# Patient Record
Sex: FEMALE | Race: BLACK OR AFRICAN AMERICAN | NOT HISPANIC OR LATINO | Employment: FULL TIME | URBAN - METROPOLITAN AREA
[De-identification: names, ages, dates, MRNs, and addresses within clinical notes are randomized per-mention and may not be internally consistent; named-entity substitution may affect disease eponyms.]

---

## 2019-05-01 ENCOUNTER — TELEPHONE (OUTPATIENT)
Dept: NEUROLOGY | Facility: CLINIC | Age: 59
End: 2019-05-01

## 2019-05-01 NOTE — TELEPHONE ENCOUNTER
----- Message from Leonora Stanley sent at 4/30/2019  2:37 PM CDT -----  Contact: pt  Name of Who is Calling: Octavia      What is the request in detail: requesting a call back in reference to getting scheduled to be seen by Dr Cavazos for a Concussion. Pt states that she had a missed call from [facility], but I dont see where anyone contacted her. Pt states that she was originally trying to be scheduled going through , but she will be going through her insurance and really needs to be seen. Please call and advise    Can the clinic reply by [facility]: no       What Number to [facility] Back [facility] not [facility]: [phone]

## 2019-05-08 ENCOUNTER — TELEPHONE (OUTPATIENT)
Dept: NEUROLOGY | Facility: CLINIC | Age: 59
End: 2019-05-08

## 2019-05-08 NOTE — TELEPHONE ENCOUNTER
Call received from pt. Very upset about not getting a soon appt for concussion. Also states she has been calling for appt for over a month (no other messages seen other than 5/1).     Appt rescheduled for Dr Cavazos on 5/20 at 1pm at [facility]. Pt aware of the above and in agreement and satisfied. Appt reminder also postal mailed.

## 2019-05-20 ENCOUNTER — OFFICE VISIT (OUTPATIENT)
Dept: NEUROLOGY | Facility: CLINIC | Age: 59
End: 2019-05-20
Payer: OTHER MISCELLANEOUS

## 2019-05-20 VITALS
WEIGHT: 246.25 LBS | HEIGHT: 66 IN | DIASTOLIC BLOOD PRESSURE: 83 MMHG | BODY MASS INDEX: 39.58 KG/M2 | HEART RATE: 80 BPM | SYSTOLIC BLOOD PRESSURE: 126 MMHG

## 2019-05-20 DIAGNOSIS — S13.4XXA WHIPLASH, INITIAL ENCOUNTER: ICD-10-CM

## 2019-05-20 DIAGNOSIS — H81.90 VESTIBULOPATHY, UNSPECIFIED LATERALITY: ICD-10-CM

## 2019-05-20 DIAGNOSIS — G43.901 STATUS MIGRAINOSUS: ICD-10-CM

## 2019-05-20 DIAGNOSIS — M54.81 BILATERAL OCCIPITAL NEURALGIA: ICD-10-CM

## 2019-05-20 DIAGNOSIS — S06.0X0D CONCUSSION WITHOUT LOSS OF CONSCIOUSNESS, SUBSEQUENT ENCOUNTER: Primary | ICD-10-CM

## 2019-05-20 DIAGNOSIS — G44.329 CHRONIC POST-TRAUMATIC HEADACHE, NOT INTRACTABLE: ICD-10-CM

## 2019-05-20 DIAGNOSIS — G44.86 CERVICOGENIC HEADACHE: ICD-10-CM

## 2019-05-20 DIAGNOSIS — H51.9 CONVERGENCE INSUFFICIENCY OR PALSY IN BINOCULAR EYE MOVEMENT: ICD-10-CM

## 2019-05-20 DIAGNOSIS — F07.81 POST-CONCUSSION SYNDROME: ICD-10-CM

## 2019-05-20 PROCEDURE — 99999 PR PBB SHADOW E&M-EST. PATIENT-LVL III: CPT | Mod: PBBFAC,,, | Performed by: PSYCHIATRY & NEUROLOGY

## 2019-05-20 PROCEDURE — 99999 PR PBB SHADOW E&M-EST. PATIENT-LVL III: ICD-10-PCS | Mod: PBBFAC,,, | Performed by: PSYCHIATRY & NEUROLOGY

## 2019-05-20 PROCEDURE — 99204 OFFICE O/P NEW MOD 45 MIN: CPT | Mod: S$GLB,,, | Performed by: PSYCHIATRY & NEUROLOGY

## 2019-05-20 PROCEDURE — 99204 PR OFFICE/OUTPT VISIT, NEW, LEVL IV, 45-59 MIN: ICD-10-PCS | Mod: S$GLB,,, | Performed by: PSYCHIATRY & NEUROLOGY

## 2019-05-20 RX ORDER — TRAMADOL HYDROCHLORIDE 50 MG/1
TABLET ORAL
COMMUNITY
End: 2019-08-09

## 2019-05-20 RX ORDER — INDOMETHACIN 75 MG/1
CAPSULE, EXTENDED RELEASE ORAL
COMMUNITY
End: 2019-08-09

## 2019-05-20 RX ORDER — OXYCODONE AND ACETAMINOPHEN 5; 325 MG/1; MG/1
TABLET ORAL
COMMUNITY
End: 2019-08-09

## 2019-05-20 RX ORDER — METOPROLOL SUCCINATE 50 MG/1
TABLET, EXTENDED RELEASE ORAL
COMMUNITY
End: 2021-08-04

## 2019-05-20 RX ORDER — AZITHROMYCIN 250 MG/1
TABLET, FILM COATED ORAL
COMMUNITY
End: 2019-08-09

## 2019-05-20 RX ORDER — CODEINE PHOSPHATE AND GUAIFENESIN 10; 100 MG/5ML; MG/5ML
SOLUTION ORAL
COMMUNITY
End: 2019-08-09

## 2019-05-20 RX ORDER — KETOROLAC TROMETHAMINE 10 MG/1
TABLET, FILM COATED ORAL
COMMUNITY
End: 2019-08-09

## 2019-05-20 RX ORDER — TRIAMTERENE AND HYDROCHLOROTHIAZIDE 37.5; 25 MG/1; MG/1
CAPSULE ORAL
COMMUNITY
End: 2019-08-09

## 2019-05-20 RX ORDER — ERGOCALCIFEROL 1.25 MG/1
CAPSULE ORAL
COMMUNITY
End: 2019-08-09

## 2019-05-20 RX ORDER — FLUTICASONE PROPIONATE 50 MCG
SPRAY, SUSPENSION (ML) NASAL
COMMUNITY
End: 2019-08-09

## 2019-05-20 RX ORDER — PREDNISONE 20 MG/1
TABLET ORAL
COMMUNITY
End: 2019-08-09

## 2019-05-20 RX ORDER — CIPROFLOXACIN 500 MG/1
TABLET ORAL
COMMUNITY
End: 2019-08-09

## 2019-05-20 RX ORDER — HYDROXYZINE PAMOATE 50 MG/1
CAPSULE ORAL
COMMUNITY
End: 2019-08-09

## 2019-05-20 RX ORDER — AMLODIPINE BESYLATE 5 MG/1
TABLET ORAL
Refills: 2 | COMMUNITY
Start: 2019-04-22 | End: 2022-01-12

## 2019-05-20 RX ORDER — GABAPENTIN 300 MG/1
CAPSULE ORAL
COMMUNITY
End: 2019-08-09

## 2019-05-20 RX ORDER — HYDROCHLOROTHIAZIDE 25 MG/1
25 TABLET ORAL
Refills: 2 | COMMUNITY
Start: 2019-04-22 | End: 2024-03-27 | Stop reason: SDUPTHER

## 2019-05-20 RX ORDER — OMEGA-3 FATTY ACIDS 1000 MG
2 CAPSULE ORAL
COMMUNITY
End: 2019-08-09

## 2019-05-20 RX ORDER — SIMETHICONE 80 MG
TABLET,CHEWABLE ORAL
COMMUNITY
End: 2019-08-09

## 2019-05-20 RX ORDER — METHYLPREDNISOLONE 4 MG/1
TABLET ORAL
Qty: 1 PACKAGE | Refills: 0 | Status: SHIPPED | OUTPATIENT
Start: 2019-05-20 | End: 2019-08-09

## 2019-05-20 RX ORDER — DICLOFENAC SODIUM 10 MG/G
4 GEL TOPICAL
COMMUNITY
End: 2019-08-09

## 2019-05-20 RX ORDER — CELECOXIB 200 MG/1
CAPSULE ORAL
COMMUNITY
End: 2019-08-09

## 2019-05-20 RX ORDER — LOSARTAN POTASSIUM 50 MG/1
TABLET ORAL
COMMUNITY
End: 2019-08-09

## 2019-05-20 RX ORDER — COLCHICINE 0.6 MG/1
TABLET ORAL
COMMUNITY
End: 2019-08-09

## 2019-05-20 NOTE — PROGRESS NOTES
Subjective:       Patient ID: Octavia Arrington is a 59 y.o. female.    Chief Complaint:  Concussion and Headache      Consultation Requested by:   Provider Lisa  No address on file    History of Present Illness  59-year-old female presents for evaluation of concussion sustained on 03/14/2019.  She was at work as an  when she hit the top of her head on the right side on a shelf when she bent over to grab a broom.  She did not lose consciousness at that time but had immediate dizziness and headache.  She notes that now her main complaint is headache.  She notes her headaches last for 2-3 hours and happen 4 to 5 times a week.  She shooting tingling pain on the top right of her scalp and on the occiput.  She also notes that she has blurring of vision especially longer duration of work days.  She notes she is having no memory issues she notes she is having no emotional issues.  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    History reviewed. No pertinent past medical history.    History reviewed. No pertinent surgical history.    History reviewed. No pertinent family history.    Social History     Socioeconomic History    Marital status:      Spouse name: Not on file    Number of children: Not on file    Years of education: Not on file    Highest education level: Not on file   Occupational History    Not on file   Social Needs    Financial resource strain: Not on file    Food insecurity:     Worry: Not on file     Inability: Not on file    Transportation needs:     Medical: Not on file     Non-medical: Not on file   Tobacco Use    Smoking status: Never Smoker    Smokeless tobacco: Never Used   Substance and Sexual Activity    Alcohol use: Never     Frequency: Never    Drug use: Not on file    Sexual activity: Not on file   Lifestyle    Physical activity:      Days per week: Not on file     Minutes per session: Not on file    Stress: Not on file   Relationships    Social connections:     Talks on phone: Not on file     Gets together: Not on file     Attends Hinduism service: Not on file     Active member of club or organization: Not on file     Attends meetings of clubs or organizations: Not on file     Relationship status: Not on file   Other Topics Concern    Not on file   Social History Narrative    Not on file       Review of Systems  Review of Systems   Constitutional: Positive for fatigue.   Eyes: Negative for photophobia and visual disturbance.   Gastrointestinal: Negative for nausea and vomiting.   Musculoskeletal: Positive for neck stiffness. Negative for neck pain.   Neurological: Positive for dizziness and headaches.   Psychiatric/Behavioral: Negative for confusion, decreased concentration and sleep disturbance.   All other systems reviewed and are negative.      Objective:     Vitals:    05/20/19 1331   BP: 126/83   Pulse: 80      Physical Exam   Constitutional: She is oriented to person, place, and time. She appears well-developed and well-nourished. No distress.   HENT:   Head: Normocephalic and atraumatic.   Right Ear: Hearing normal.   Left Ear: Hearing normal.   Eyes: Pupils are equal, round, and reactive to light. EOM are normal. Right eye exhibits normal extraocular motion and no nystagmus. Left eye exhibits normal extraocular motion and no nystagmus.   Neck: Neck supple. Muscular tenderness present. No spinous process tenderness present. Carotid bruit is not present. No neck rigidity. Decreased range of motion present.       Neurological: She is alert and oriented to person, place, and time. She has normal strength and normal reflexes. She displays no atrophy and no tremor. No cranial nerve deficit or sensory deficit. She exhibits normal muscle tone. She has a normal Finger-Nose-Finger Test and a normal Heel to Allison Test. She displays a negative  Romberg sign. Gait normal. Coordination and gait normal. GCS eye subscore is 4. GCS verbal subscore is 5. GCS motor subscore is 6.   Reflex Scores:       Tricep reflexes are 2+ on the right side and 2+ on the left side.       Bicep reflexes are 2+ on the right side and 2+ on the left side.       Brachioradialis reflexes are 2+ on the right side and 2+ on the left side.       Patellar reflexes are 2+ on the right side and 2+ on the left side.       Achilles reflexes are 2+ on the right side and 2+ on the left side.  Fundoscopic exam shows no papilledema, no hemorrhage, no exudates bilaterally.    Cranial nerves 2-12 are without deficit.   Psychiatric: She has a normal mood and affect. Her speech is normal and behavior is normal. Thought content normal.   Vitals reviewed.        NEUROLOGICAL EXAMINATION:     MENTAL STATUS   Oriented to person, place, and time.   Attention: normal. Concentration: normal.   Speech: speech is normal   Level of consciousness: alert  Knowledge: good.   Able to name object. Able to read. Able to repeat. Able to write.     CRANIAL NERVES   Cranial nerves II through XII intact.     CN II   Visual fields full to confrontation.   Visual acuity: normal    CN III, IV, VI   Pupils are equal, round, and reactive to light.  Extraocular motions are normal.        Laina is abnormal 5/9/9, indicating vestibulopathy right worse than left.  There is convergence insufficiency, abnormal at 25 cm.     MOTOR EXAM   Muscle bulk: normal  Overall muscle tone: normal  Right arm tone: normal  Left arm tone: normal  Right leg tone: normal  Left leg tone: normal    Strength   Strength 5/5 throughout.     REFLEXES     Reflexes   Right brachioradialis: 2+  Left brachioradialis: 2+  Right biceps: 2+  Left biceps: 2+  Right triceps: 2+  Left triceps: 2+  Right patellar: 2+  Left patellar: 2+  Right achilles: 2+  Left achilles: 2+  Right : 2+  Left : 2+  Right plantar: normal  Left plantar: normal    SENSORY EXAM    Light touch normal.   Vibration normal.   Proprioception normal.   Pinprick normal.     GAIT AND COORDINATION     Gait  Gait: normal     Coordination   Finger to nose coordination: normal  Heel to shin coordination: normal     I have spent over 50% of a 45 min visit in guidance, counseling and discussion of treatment options.   Assessment/Plan:     Problem List Items Addressed This Visit     None      Visit Diagnoses     Concussion without loss of consciousness, subsequent encounter    -  Primary    Relevant Orders    Ambulatory Referral to Physical/Occupational Therapy    Ambulatory Referral to Physical/Occupational Therapy    Whiplash, initial encounter        Relevant Orders    Ambulatory Referral to Physical/Occupational Therapy    Vestibulopathy, unspecified laterality        Relevant Orders    Ambulatory Referral to Physical/Occupational Therapy    Post-concussion syndrome        Relevant Orders    Ambulatory Referral to Physical/Occupational Therapy    Ambulatory Referral to Physical/Occupational Therapy    Convergence insufficiency or palsy in binocular eye movement        Relevant Orders    Ambulatory Referral to Physical/Occupational Therapy    Cervicogenic headache        Relevant Orders    Ambulatory Referral to Physical/Occupational Therapy    Bilateral occipital neuralgia        Relevant Orders    Ambulatory Referral to Physical/Occupational Therapy    Status migrainosus        Relevant Medications    methylPREDNISolone (MEDROL DOSEPACK) 4 mg tablet    Chronic post-traumatic headache, not intractable            59-year-old female presents for evaluation of concussion sustained on 03/14/2019.  She did not have loss of consciousness at the time.  She was at work and struck her head on a shelf in a closet while reaching for a broom.  At this time she is about to go on summer break and does not plan on working at that time.  We have discussed that it would be good to get her rehabilitated over the  summer.  I believe she needs physical therapy for her vestibulopathy and her whiplash issues.  We have discussed sending in a steroid pack to break current headache cycle.  Depending on the results of that I would consider amitriptyline versus some other neuropathic pain issue or ultrasound-guided injections for what appeared to be occipital neuralgia headaches related to her concussion.  We have also discussed occupational therapy for her convergence insufficiency.  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     The patient verbalizes understanding and agreement with the treatment plan. Questions were sought and answered to her stated verbal satisfaction.        Joce Cavazos MD    This note is dictated on Dragon Natural Speaking word recognition program. There are word recognition mistakes that are occasionally missed on review.

## 2019-05-20 NOTE — LETTER
May 20, 2019      No Recipients           [facility] - Neurology  [address]  [facility] [address]  Phone: [phone]  Fax: [phone]          Patient: Octavia Arrington   MR Number: [id number]   YOB: 1960   Date of Visit: 5/20/2019       Dear Severiano Snowystem:    Thank you for referring Octavia Arrington to me for evaluation. Attached you will find relevant portions of my assessment and plan of care.    If you have questions, please do not hesitate to call me. I look forward to following Octavia Arrington along with you.    Sincerely,    Gerard Cavazos III, MD    Enclosure  CC:  No Recipients    If you would like to receive this communication electronically, please contact [email or web address] or [phone] to request more information on [employer] access.    For providers and/or their staff who would like to refer a patient to [facility], please contact us through our one-stop-shop provider referral line, Jefferson Lucas, at [phone].    If you feel you have received this communication in error or would no longer like to receive these types of communications, please e-mail [email or web address]

## 2019-06-03 ENCOUNTER — CLINICAL SUPPORT (OUTPATIENT)
Dept: REHABILITATION | Facility: HOSPITAL | Age: 59
End: 2019-06-03
Attending: PSYCHIATRY & NEUROLOGY
Payer: OTHER MISCELLANEOUS

## 2019-06-03 DIAGNOSIS — H53.9 DIFFICULTY READING DUE TO VISUAL PROBLEM: ICD-10-CM

## 2019-06-03 DIAGNOSIS — H05.823 EXTRAOCULAR MUSCLE WEAKNESS OF BOTH EYES: ICD-10-CM

## 2019-06-03 PROCEDURE — 97165 OT EVAL LOW COMPLEX 30 MIN: CPT | Mod: PN

## 2019-06-03 NOTE — PLAN OF CARE
[facility] Occupational Therapy  Initial Neurological Evaluation     Date: 6/3/2019  Patient: Octavia Arrington  Chart Number: [id number]    Therapy Diagnosis:   Encounter Diagnoses   Name Primary?    Extraocular muscle weakness of both eyes     Difficulty reading due to visual problem      Physician: Gerard Cavazos III, MD    Physician Orders: Eval and treat  Medical Diagnosis:   S06.0X0D (ICD-10-CM) - Concussion without loss of consciousness, subsequent encounter   F07.81 (ICD-10-CM) - Post-concussion syndrome   H51.9 (ICD-10-CM) - Convergence insufficiency or palsy in binocular eye movement     Evaluation Date: 6/3/2019  Plan of Care Expiration Period: 6/3/19 to 8/26/19  Insurance Authorization period Expiration: tbd  Date of Return to MD: not appointed  Visit # / Visits Authorized: 1 / 1  FOTO: n/a(workers comp)    Time In:2:10 pm  Time Out: 3:10 pm  Total Billable (one on one) Time: 60 minutes    Precautions: Standard    Subjective     History of Current Condition:  Pt hit her head on a shelf when putting equipment back in closet.  I have difficulty with reading the words blur.    Involved Side: head  Dominant Side: Right  Date of Onset: March 14, 2019  Surgical Procedure: n/a  Imaging: CT at [facility]   Previous Therapy: none    Patient's Goals for Therapy: Would like not to have blurry vision.    Pain:  Pain Related Behaviors Observed: yes  Cautious with cervical rotation to right   Functional Pain Scale Rating 0-10:   3-4/10 on average  1-2/10 at best  5-6/10 at worst  Location: traps and neck  Description: Shocking pain then sore intermittently  Aggravating Factors: unable to determine it  Easing Factors: massaging it, moist heat    Occupation:  Assistant   Working presently: employed  Duties: cook, inventory, cleaning, packing, reports    Functional Limitations/Social History:    Prior Level of Function: Independent with all work and home tasks.   Current Level of Function: pt has  increased neck pain but pushing through, difficulty with viewing work on computer    Home/Living environment : lives with their spouse  Home Access: 7 steps to get in, two stories  DME: n/a     Leisure: fishing, leader of children choir    Driving: actively    Past Medical History/Physical Systems Review:     Past Medical History:  Octavia Arrington  has no past medical history on file.    Past Surgical History:  Octavia Arrington  has no past surgical history on file.    Current Medications:  Octavia has a current medication list which includes the following prescription(s): amlodipine, azithromycin, celecoxib, ciprofloxacin hcl, colchicine, diclofenac sodium, efinaconazole, ergocalciferol, fluticasone propionate, gabapentin, guaifenesin-codeine 100-10 mg/5 ml, hydrochlorothiazide, hydroxyzine pamoate, indomethacin, ketorolac, losartan, methylprednisolone, metoprolol succinate, omega-3 fatty acids, oxycodone-acetaminophen, prednisone, simethicone, tramadol, and triamterene-hydrochlorothiazide 37.5-25 mg.   Only taking :  Hydroclorothiazide, amlodipine    Allergies:  Review of patient's allergies indicates:  No Known Allergies       Objective     Cognitive Exam:  Oriented: Person, Place, Time and Situation  Behaviors: normal, cooperative  Follows Commands/attention: Follows two-step commands  Communication: clear/fluent  Memory: intact LTM as determined by 3 word recall after 1 minute and 3 minutes  Safety awareness/insight to disability: aware of diagnosis, treatment, and prognosis  Coping skills/emotional control: Appropriate to situation    Visual/Perceptual:  Tracking: vertical and horizontal intact, diagonal fatigue before 5 reps  Saccades: impaired unable to continue after 3 reps  Acuity:n/t  R/L discrimination: intact  Visual field: intact  Motor Planning Praxis: intact    Comments: pt has blurriness with reading emails 3.5 to 4.5 minutes before adjustments need to be made to font      Physical Exam:  Postural  examination/scapula alignment: slight forward head  Joint integrity: intact no tightness noted  Skin integrity: intact  Edema: none noted     AROM of UE WFL, no pain reported with AROM, arthritis pain in hands.     Fist: normal      Strength 6/3/2019 6/3/2019   **within available ROM** Left Right   Shoulder flex 4+/5 4+/5   Shoulder abd 4+/5 4+/5   Shoulder ER 4+/5 4+/5   Shoulder IR 4+/5 4+/5   Shoulder Extension 4+/5 4+/5   Shoulder Horizontal adduction 4+/5 4+/5   Elbow flex 4+/5 4+/5   Elbow ext 4+/5 4+/5   Wrist flex 4+/5 4+/5   Wrist ext 4+/5 4+/5   Supination 4+/5 4+/5   Pronation 4+/5 4+/5   Gross motor coordination:   - STEPH (Rapid Alternating Movements): intac  - Finger to Nose (5 times): intact  - Finger Flicks (coordination moving from digit flexion to digit extension): intact    Tone:  Modified Vernon Scale:   0 - No increase in muscle tone    Sensation:  Octavia  reports no numbness or tingling in arms or hands at this time.     Balance:   No sitting balance issues reported    Endurance Deficit: mild at times                    Functional Status      Functional Mobility:  Pt ambulates into clinic as well as drives, she reports no difficulty with mobility tasks.    ADL's:  Feeding: I  Grooming: I  Hygiene: I  UB Dressing: I  LB Dressing: I  Toileting: I  Bathing: I    IADL's:  Homecare: I  Cooking: I  Laundry: I  Use of telephone: I  Money management: I  Medication management: I  Handwriting:I  Technology Use:I    Comments:      Treatment       Home Exercises and Patient Education Provided    Education provided:   -role of OT, goals for OT, scheduling/cancellations, insurance limitations with patient.  -Additional Education provided: re: eye muscles, rest for eyes and recovery from a concussion    Written Home Exercises Provided: yes.  Exercises were reviewed and Octavia was able to demonstrate them prior to the end of the session.    Octavia demonstrated good  understanding of the education provided.        Assessment     Octavia Arrington is a 59 y.o. female referred to outpatient occupational therapy and presents with a medical diagnosis of post concussion syndrome, Convergence insufficiency or palsy in binocular eye movement, resulting in blurred vision, eye fatigue, difficulty with tasks and demonstrates limitations as described in the chart below. Following medical record review it is determined that pt will benefit from occupational therapy services in order to maximize pain free and/or functional use of eyes for reading and functional work tasks.    Pt prognosis is Good due to her injury occuured in March and she continues to work and have minimal rest for her eyes.  Pt will benefit from skilled outpatient Occupational Therapy to address the deficits stated above and in the chart below, provide pt/family education, and to maximize pt's level of independence.     Plan of care discussed with patient: Yes  Pt's spiritual, cultural and educational needs considered and patient is agreeable to the plan of care and goals as stated below:     Anticipated Barriers for therapy: none noted    Medical Necessity is demonstrated by the following  Profile and History Assessment of Occupational Performance Level of Clinical Decision Making Complexity Score   Occupational Profile:   Octavia Arrington is a 59 y.o. female who lives with their spouse and is currently employed as assistant  in school. Octavia Arrington has difficulty with  reading, head pain  phone/computer use and fatigue  affecting his/her daily functional abilities. His/her main goal for therapy is would like not to have blurry vision.     Comorbidities:   HTN    Medical and Therapy History Review:   Brief               Performance Deficits    Physical:  Muscle Power/Strength  Muscle Endurance  eye convergence and focus    Cognitive:  No Deficits    Psychosocial:    No Deficits     Clinical Decision Making:  low    Assessment Process:  Problem-Focused  Assessments    Modification/Need for Assistance:  Rest breaks    Intervention Selection:  Limited Treatment Options       low  Based on PMHX, co morbidities , data from assessments and functional level of assistance required with task and clinical presentation directly impacting function.       The following goals were discussed with the patient and patient is in agreement with them as to be addressed in the treatment plan.     Goals:  Pt will perform her HEP daily    Pt will be able to perform 10 reps of tracking without a rest break  Pt will demonstrate convergence at appropriate distance of 6 cm.  Pt will be able to read for 5 minutes without vision changes      Plan   Certification Period/Plan of care expiration: 6/3/2019 to 8/26/19.    Outpatient Occupational Therapy 1 time weekly for 12 weeks to include the following interventions: eye exercises, combination of activities with eye motion, education .    ZAFAR Temple      I certify the need for these services furnished under this plan of treatment and while under my care.  ____________________________________ Physician/Referring Practitioner     Date of signature

## 2019-06-09 NOTE — PROGRESS NOTES
Occupational Therapy Daily Treatment Note     Date: 6/10/2019  Name: Octavia Arrington  Clinic Number: [id number]    Therapy Diagnosis:   Encounter Diagnoses   Name Primary?    Extraocular muscle weakness of both eyes     Difficulty reading due to visual problem      Physician: Gerard Cavazos III, MD    Physician Orders: Eval and treat  Medical Diagnosis:   S06.0X0D (ICD-10-CM) - Concussion without loss of consciousness, subsequent encounter   F07.81 (ICD-10-CM) - Post-concussion syndrome   H51.9 (ICD-10-CM) - Convergence insufficiency or palsy in binocular eye movement      Evaluation Date: 6/3/2019  Plan of Care Expiration Period: 6/3/19 to 8/26/19  Insurance Authorization period Expiration: 5/19/20  Date of Return to MD: not appointed  Visit # / Visits Authorized: 1 / 12  FOTO: n/a(workers comp)     Time In: 5::07 pm  Time Out: 5:45 pm  Total Billable (one on one) Time: 38 minutes     Precautions: Standard    Subjective     Pt reports:   She is not having a headache today. Yesterday she had headache that came on in the morning.   she was compliant with home exercise program given last session, but had some errors with turning her head   Response to previous treatment:  Eyes were very tired  Functional change: none     Pain: 4-5/10  Location: neck     Objective     Octavia participated in neuromuscular re-education activities to improve: Coordination and strength for 38 minutes. The following activities were included:  -horizontal and vertical tracking x 5 reps each  -saccades left and right 55 BPM 30 seconds  -saccades vertical 55 BPM x 30 seconds  -diagonal saccades x 2 directions 55 BPM x 30 seconds  -target focus  15 seconds stopped secondary to neck pain  -Eye can:  Rabbit jumping x 30 second, bug jump motor plan x 30 seconds all accurate touches                   Saccades slow and medium with perfect recall   Sci fit with BUE x 6 min with verbal cues for directional changes every minute.    Home Exercises and  Education Provided     Education provided:   - Progress towards goals     Written Home Exercises Provided: Patient instructed to cont prior HEP.-tracking program  Exercises were reviewed and Octavia was able to demonstrate them prior to the end of the session.  Octavia demonstrated good  understanding of the HEP provided.   .   See EMR under Patient Instructions for exercises provided prior visit.        Assessment     Pt would continue to benefit from skilled OT.  Pt still has decreased eye endurance with difficulty with convergence. Pt with good engagement in therapy session.      Octavia is progressing slowly towards her goals and there are no updates to goals at this time. Pt prognosis is Good.     Pt will continue to benefit from skilled outpatient occupational therapy to address the deficits listed in the problem list on initial evaluation provide pt/family education and to maximize pt's level of independence in the home and community environment.     Anticipated barriers to occupational therapy: none noted    Pt's spiritual, cultural and educational needs considered and pt agreeable to plan of care and goals.    Goals:  Pt will perform her HEP daily    Pt will be able to perform 10 reps of tracking without a rest break  Pt will demonstrate convergence at appropriate distance of 6 cm.  Pt will be able to read for 5 minutes without vision changes       Plan   Progress patients activities reps as tolerated to achieve goals      ZAFAR Temple

## 2019-06-10 ENCOUNTER — CLINICAL SUPPORT (OUTPATIENT)
Dept: REHABILITATION | Facility: HOSPITAL | Age: 59
End: 2019-06-10
Payer: OTHER MISCELLANEOUS

## 2019-06-10 DIAGNOSIS — H05.823 EXTRAOCULAR MUSCLE WEAKNESS OF BOTH EYES: ICD-10-CM

## 2019-06-10 DIAGNOSIS — H53.9 DIFFICULTY READING DUE TO VISUAL PROBLEM: ICD-10-CM

## 2019-06-10 PROCEDURE — 97112 NEUROMUSCULAR REEDUCATION: CPT | Mod: PN

## 2019-06-16 NOTE — PROGRESS NOTES
Occupational Therapy Daily Treatment Note     Date: 6/17/2019  Name: Octavia Arrington  Clinic Number: [id number]    Therapy Diagnosis:   Encounter Diagnoses   Name Primary?   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   she was not compliant with home exercise program given previously because of other family obligations.  Response to previous treatment:  Eyes were very tired  Functional change: none     Pain: 4-5/10  Location: neck     Objective     Octavia participated in neuromuscular re-education activities to improve: Coordination and strength for 32 minutes. The following activities were included:  -saccades left and right 55 BPM 30 seconds  -saccades vertical 55 BPM x 30 seconds  -diagonal saccades x 2 directions 55 BPM x 30 seconds  -4 sets of 6 colored pegs placed in her chosen sequence in pegboard- pegs left board right (saccades activity for eye coordination)  -word search puzzle location of 5 words in 3 minutes  -reversed pegs to container one at a time.   -Sci fit with BUE x 6 min with verbal cues for directional changes every minute.    Moist heat applied to neck after contraindications were cleared.    Home  Exercises and Education Provided     Education provided:   - Progress towards goals     Written Home Exercises Provided: Patient instructed to cont prior HEP.-tracking program  Exercises were reviewed and Octavia was able to demonstrate them prior to the end of the session.  Octavia demonstrated good  understanding of the HEP provided.   .   See EMR under Patient Instructions for exercises provided prior visit.        Assessment     Pt would continue to benefit from skilled OT.  Pt with good tolerance to reps today with saccades, no increase in any symptoms.  Moist heat assisted her neck pain.    Octavia is progressing slowly towards her goals and there are no updates to goals at this time. Pt prognosis is Good.     Pt will continue to benefit from skilled outpatient occupational therapy to address the deficits listed in the problem list on initial evaluation provide pt/family education and to maximize pt's level of independence in the home and community environment.     Anticipated barriers to occupational therapy: none noted    Pt's spiritual, cultural and educational needs considered and pt agreeable to plan of care and goals.    Goals:  Pt will perform her HEP daily    Pt will be able to perform 10 reps of tracking without a rest break  Pt will demonstrate convergence at appropriate distance of 6 cm.  Pt will be able to read for 5 minutes without vision changes       Plan   Progress patients activities reps as tolerated to achieve goals      ZAFAR Temple

## 2019-06-17 ENCOUNTER — CLINICAL SUPPORT (OUTPATIENT)
Dept: REHABILITATION | Facility: HOSPITAL | Age: 59
End: 2019-06-17
Payer: OTHER MISCELLANEOUS

## 2019-06-17 DIAGNOSIS — H53.9 DIFFICULTY READING DUE TO VISUAL PROBLEM: ICD-10-CM

## 2019-06-17 DIAGNOSIS — H05.823 EXTRAOCULAR MUSCLE WEAKNESS OF BOTH EYES: ICD-10-CM

## 2019-06-17 PROCEDURE — 97112 NEUROMUSCULAR REEDUCATION: CPT | Mod: PN

## 2019-06-28 ENCOUNTER — CLINICAL SUPPORT (OUTPATIENT)
Dept: REHABILITATION | Facility: HOSPITAL | Age: 59
End: 2019-06-28
Attending: PSYCHIATRY & NEUROLOGY
Payer: OTHER MISCELLANEOUS

## 2019-06-28 DIAGNOSIS — R53.1 DECREASED STRENGTH: ICD-10-CM

## 2019-06-28 DIAGNOSIS — R29.898 DECREASED ROM OF NECK: ICD-10-CM

## 2019-06-28 DIAGNOSIS — H81.90 VESTIBULOPATHY, UNSPECIFIED LATERALITY: ICD-10-CM

## 2019-06-28 PROCEDURE — 97162 PT EVAL MOD COMPLEX 30 MIN: CPT | Mod: PN | Performed by: PHYSICAL THERAPIST

## 2019-06-28 PROCEDURE — 97110 THERAPEUTIC EXERCISES: CPT | Mod: PN | Performed by: PHYSICAL THERAPIST

## 2019-06-28 NOTE — PATIENT INSTRUCTIONS
Levator Stretch        Grasp seat or sit on hand on side to be stretched. Turn head toward other side and look down. Use hand on head to gently stretch neck in that position. Hold 15-30 seconds. Repeat on other side.  Repeat 3 times each side. Do 1-2 sessions per day.    Upper Trapezius Stretch        Look straight ahead. Gently grasp right side of head while reaching behind back with other hand. Tilt head away until a gentle stretch is felt. Hold 15-30 seconds. Repeat on opposite side.   Repeat 3 times each side. Do 1-2 sessions per day.      Shoulder Blade Squeeze        Rotate shoulders back, then squeeze shoulder blades together. Hold 5 seconds.   Repeat 10 times. Do 2 sets, 1-2 sessions per day.

## 2019-06-30 NOTE — PROGRESS NOTES
Occupational Therapy Progress  Note     Date: 7/1/2019  Name: Octavia Arrington  Clinic Number: [id number]    Therapy Diagnosis:   Encounter Diagnoses   Name Primary?   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   she was not compliant with home exercise program given previously because of other family obligations.  Response to previous treatment:  Not too blurred   Functional change: none     Pain: 4/10  Location: neck /right scapula    Objective     Octavia participated in neuromuscular re-education activities to improve: Coordination and strength for 45 minutes. The following activities were included:  -saccades left and right 60 BPM 60 seconds- initial prompting to move to cues, 3-4 off pace changes  -saccades vertical 60 BPM x 60 seconds-smooth motion throughtout  - four letter squares: left to right (top and bottom) 1st horizontal row single letters at a time                                    Vertical down to up left squares                                    Diagonal squares 4th rows single letters .                                    Last letter row reverse clockwise motion  -eye can learn online;  Medium speed  number tracking with good flow,                                        Fast speed 1st round behind 2 numbers,  2nd round on target number.  -Sci fit with BUE x 6 min with verbal cues for directional changes every minute.    Moist heat applied to neck after contraindications were cleared.    Home Exercises and Education Provided     Education provided:   - Pt encouraged to conserve energy at home and plan out activities to avoid fatigue.     Written Home Exercises Provided: Patient instructed to cont prior HEP.-tracking program  Exercises were reviewed and Octavia was able to demonstrate them prior to the end of the session.  Octavia demonstrated good  understanding of the HEP provided.   .   See EMR under Patient Instructions for exercises provided prior visit.        Assessment     Pt would continue to benefit from skilled OT.  Pt with improvement in endurance of eye exercise with increased time of performance of saccades. Pain in neck and scapula remain but improved with heat application.      Octavia is progressing slowly towards her goals and there are no updates to goals at this time. Pt prognosis is Good.     Pt will continue to benefit from skilled outpatient occupational therapy to address the deficits listed in the problem list on initial evaluation provide pt/family education and to maximize pt's level of independence in the home and community environment.     Anticipated barriers to occupational therapy: none noted    Pt's spiritual, cultural and educational needs considered and pt agreeable to plan of care and goals.    Goals:  Pt will perform her HEP daily    Pt will be able to perform 10 reps of tracking without a rest break- met 7/1/19  Pt will demonstrate convergence at appropriate distance of 6 cm.  Pt will be able to read for 5 minutes without vision changes       Plan   Progress patients activities reps as tolerated to achieve goals      ZAFAR Temple

## 2019-07-01 ENCOUNTER — CLINICAL SUPPORT (OUTPATIENT)
Dept: REHABILITATION | Facility: HOSPITAL | Age: 59
End: 2019-07-01
Attending: PSYCHIATRY & NEUROLOGY
Payer: OTHER MISCELLANEOUS

## 2019-07-01 DIAGNOSIS — H53.9 DIFFICULTY READING DUE TO VISUAL PROBLEM: ICD-10-CM

## 2019-07-01 DIAGNOSIS — H05.823 EXTRAOCULAR MUSCLE WEAKNESS OF BOTH EYES: ICD-10-CM

## 2019-07-01 PROCEDURE — 97112 NEUROMUSCULAR REEDUCATION: CPT | Mod: PN

## 2019-07-07 NOTE — PROGRESS NOTES
Occupational Therapy Daily Treatment Note     Date: 7/8/2019  Name: Octavia Arrington  Clinic Number: [id number]    Therapy Diagnosis:   Encounter Diagnoses   Name Primary?   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  I haven't slept well since I hit my head.  she was not compliant with home exercise program given previously .  Response to previous treatment:    Functional change: none     Pain: 5/10  Location: neck /right scapula    Objective     Octavia participated in neuromuscular re-education activities to improve: Coordination and strength for 28 minutes. The following activities were included:  -saccades left and right 60 BPM 60 seconds- initial prompting to move to cues, 3-4 off pace changes  -saccades vertical 65 BPM x 60 seconds-smooth motion throughout- c/o rolling like clouds near the end of the 60's, then line floaters when she stopped  -saccades horizonatal 65 BPM x 60 seconds with jumpy movement intermittently - foggy or cloudy spots in vision less then vertical motions.  -dot to dot pages x 3 with 50 numbers each-pt performed quickly  -sorting playing cards into suits - pt  "performed quickly    Moist heat applied to neck after contraindications were cleared.    Home Exercises and Education Provided     Education provided:   - Pt encouraged to perform her eye exercises at home     Written Home Exercises Provided: Patient instructed to cont prior HEP.-tracking program  Exercises were reviewed and Octavia was able to demonstrate them prior to the end of the session.  Octavia demonstrated good  understanding of the HEP provided.   .   See EMR under Patient Instructions for exercises provided prior visits.        Assessment     Pt would continue to benefit from skilled OT.  Pt with varying eye symptoms each treatment. Pt with convergence at 15 " today.    Octavia is progressing slowly towards her goals and there are no updates to goals at this time. Pt prognosis is Good.     Pt will continue to benefit from skilled outpatient occupational therapy to address the deficits listed in the problem list on initial evaluation provide pt/family education and to maximize pt's level of independence in the home and community environment.     Anticipated barriers to occupational therapy: none noted    Pt's spiritual, cultural and educational needs considered and pt agreeable to plan of care and goals.    Goals:  Pt will perform her HEP daily    Pt will be able to perform 10 reps of tracking without a rest break- met 7/1/19  Pt will demonstrate convergence at appropriate distance of 6 cm.- not met  Pt will be able to read for 5 minutes without vision changes       Plan   Progress patients activities reps as tolerated to achieve goals      ZAFAR Temple  "

## 2019-07-08 ENCOUNTER — CLINICAL SUPPORT (OUTPATIENT)
Dept: REHABILITATION | Facility: HOSPITAL | Age: 59
End: 2019-07-08
Attending: PSYCHIATRY & NEUROLOGY
Payer: OTHER MISCELLANEOUS

## 2019-07-08 DIAGNOSIS — H05.823 EXTRAOCULAR MUSCLE WEAKNESS OF BOTH EYES: ICD-10-CM

## 2019-07-08 DIAGNOSIS — H53.9 DIFFICULTY READING DUE TO VISUAL PROBLEM: ICD-10-CM

## 2019-07-08 PROCEDURE — 97112 NEUROMUSCULAR REEDUCATION: CPT | Mod: PN

## 2019-07-12 ENCOUNTER — CLINICAL SUPPORT (OUTPATIENT)
Dept: REHABILITATION | Facility: HOSPITAL | Age: 59
End: 2019-07-12
Attending: PSYCHIATRY & NEUROLOGY
Payer: OTHER MISCELLANEOUS

## 2019-07-12 DIAGNOSIS — R53.1 DECREASED STRENGTH: ICD-10-CM

## 2019-07-12 DIAGNOSIS — H81.90 VESTIBULOPATHY, UNSPECIFIED LATERALITY: ICD-10-CM

## 2019-07-12 DIAGNOSIS — R29.898 DECREASED ROM OF NECK: ICD-10-CM

## 2019-07-12 PROCEDURE — 97140 MANUAL THERAPY 1/> REGIONS: CPT | Mod: PN | Performed by: PHYSICAL THERAPIST

## 2019-07-12 PROCEDURE — 97110 THERAPEUTIC EXERCISES: CPT | Mod: PN | Performed by: PHYSICAL THERAPIST

## 2019-07-12 PROCEDURE — 97010 HOT OR COLD PACKS THERAPY: CPT | Mod: PN | Performed by: PHYSICAL THERAPIST

## 2019-07-12 PROCEDURE — 97112 NEUROMUSCULAR REEDUCATION: CPT | Mod: PN | Performed by: PHYSICAL THERAPIST

## 2019-07-12 NOTE — PROGRESS NOTES
Physical Therapy Daily Treatment Note     Name: Octavia Arrington  Clinic Number: [id number]    Therapy Diagnosis:    Decreased ROM of neck      Decreased strength      Vestibulopathy, unspecified laterality        Physician: Gerard Cavazos III, MD    Visit Date: 7/12/2019    Physician Orders: PT Eval and Treat, therapeutic exercise passive, active resistive, cervical traction  Medical Diagnosis from Referral:   S06.0X0D (ICD-10-CM) - Concussion without loss of consciousness, subsequent encounter   S13.4XXA (ICD-10-CM) - Whiplash, initial encounter   H81.90 (ICD-10-CM) - Vestibulopathy, unspecified laterality   F07.81 (ICD-10-CM) - Post-concussion syndrome   R51 (ICD-10-CM) - Cervicogenic headache   M54.81 (ICD-10-CM) - Bilateral occipital neuralgia         Evaluation Date: 6/28/2019  Authorization Period Expiration: 05/19/2020  Plan of Care Expiration: 8/23/19  Visit # / Visits authorized: 2/ 12     Time In: 7:44  Time Out: 8:45  Total Billable Time: 60 minutes     Precautions: Standard      Subjective     Pt reports: she is still having her R-sided neck pain, and she has been doing all her stretches except the one where she bends her head to the right (due to increased pain)  She was compliant with home exercise program.  Response to previous treatment: no adverse effects reported  Functional change: on-going    Pain: 3-4/10 to start session, 2/10 at conclusion of session; R sided cervical and shoulder pain  Location: R sided cervical and shoulder region     Objective     Octavia received the following manual therapy techniques: Manual traction, Myofacial release and Soft tissue Mobilization were applied to the: Cervical region for 20 minutes, including:  Supine: soft tissue manipulation with Biofreeze to R upper quadrant: effleurage, kneading. Gentle muscle play of upper trap.    Manual R upper trap release   Cervical upslides (gentle- grade 2)   Cervical glides P-A (gentle- grade 2)   Suboccipital release  (gentle)   suboccipital rotation (grade 1-2)  Side lying: R levator scap release with scapular depression   R Levator scap release with scapular retraction   Trigger point release R levator scapulae    R scapular mobiilty      Octavia received therapeutic exercises to develop strength, endurance, ROM, flexibility, posture and core stabilization for 25 minutes including:    Sitting chin tucks 1 x 20  pec stretch in corner 3 x 30 seconds - pt reported slight R arm discomfort and demonstrated R shoulder hiking  Scapular retraction with yellow theraband 2 x 10     Octavia participated in neuromuscular re-education activities to improve: Balance, Coordination, Kinesthetic, Sense, Proprioception and Posture for 15 minutes. The following activities were included:    Horizontal smooth pursuit - 2x 30 seconds - no eye slippage no excess blinkage  Horizontal saccades @ 65 bpm 2 x 30 seconds standing at blank background excess blinkage - pt reported her eyes were dry and she was seeing floaters    Moist Hot Pack x ~15 min to posterior cervical region during the following exercises:  Smooth pursuits  Saccades  pec stretch  Scapular retractions      Home Exercises Provided and Patient Education Provided     Education provided:   - pt was educated on new additional therex for HEP, which include pectoral stretches and chin tucks    Written Home Exercises Provided: yes.  Exercises were reviewed and Octavia was able to demonstrate them prior to the end of the session.  Octavia demonstrated good  understanding of the education provided.      See EMR under Patient Instructions for exercises provided 6/28/2019.    Assessment     Pt tolerated PT session well today. Pt demonstrated heightened sensitivity and muscle guarding in the R cervical region, which showed improvements after manual therapy by PT. Pt had difficulty remaining relaxed throughout manual therapy treatment. Pt was educated on two new therex for HEP, which included chin tucks  "to strengthen deep cervical flexor muscles and pectoral stretches to improve rounded shoulders posture. Pt performed these new exercises with quality understanding and form. Pt demonstrated slow ocular saccades with no reported symptoms of dizziness, but states she begins to see "floaters."       Octavia is progressing well towards her goals.   Pt prognosis is Good.     Pt will continue to benefit from skilled outpatient physical therapy to address the deficits listed in the problem list box on initial evaluation, provide pt/family education and to maximize pt's level of independence in the home and community environment.     Pt's spiritual, cultural and educational needs considered and pt agreeable to plan of care and goals.     Anticipated barriers to physical therapy: trouble following directions, hypersensitivity, significant muscle guarding, significant anticipation to pain    Goals:  Short term goals: 4 weeks, pt agrees to goals set.              1.         Pt will demonstrate understanding and independence with HEP to progress towards functional goals and improve carryover of progress made. ongoing  2.         Pt will report max pain in neck less than 4/10 to demonstrate decreased muscle tone and improved quality of life. ongoing  3.         Pt will demonstrate cervical flexion/extension improvement in active range of motion by 5 degrees each to show decreased muscle tone and pain to improve ability to move head (I.e. During Functional mobility). ongoing  4.         Pt will demonstrate improved R cervical side bending active range of motion to 30 degrees to show decreased muscle tone and decrease neck pain. ongoing  5.         Pt will score MMT 4/5 bilateral Latissimus Dorsi muscle to show improvement in functional scapular strength and decrease shoulder elevation to inhibit muscle tone ongoing  6.         Pt will score MMT 4/5 bilateral Lower Trapezius muscle to show improvement in functional scapular " strength to improve posture/ decrease pain and headaches. ongoing  7.         Assess Functional Gait Assessment to determine fall risk as tolerated and set appropriate goals. ongoing  8.         Pt will demonstrate improved cervical AROM CLEMENT to 45 deg to demonstrate improved cervical motor control and improve range of Motion for driving. ongoing     Long term goals: 8 weeks, pt agrees to goals set  9.   Pt will report max pain in neck less than 2/10 to show decreased tone and improved ability to return to activities. ongoing  10. Pt will demonstrate cervical extension improvement in ROM to 55 degrees to demonstrate improved motor control of cervical motions and decrease pain in      neck. ongoing  11. Pt will balance on foam mat with feet together and eyes closed for at least 30 seconds to be able to ambulate in multi-surfaced community without risk of          falling. ongoing  12. Pt will demonstrate improved convergence point to 10cm to demonstrate improved oculomotor control and improve balance with changing levels (I.e stair negotiation). ongoing  13. Pt will demonstrate improved cervical AROM CLEMENT to 65 deg to demonstrate improved cervical motor control and improve to functional range of Motion needed for driving. ongoing       Plan     Continue cervical stretches, deep neck flexor strengthening, and scapular strengthening, and assess for need for manual therapy    Bruno Jimenes, SPT    I, Merari Dougherty, DPT, certify that I was present in the room directing the student in service delivery and guiding them using my skilled judgment. As the co-signing therapist I have reviewed the students documentation and am responsible for the treatment, assessment, and plan.     Merari Dougherty DPT  7/12/2019

## 2019-07-12 NOTE — PATIENT INSTRUCTIONS
Flexibility: Corner Stretch        Standing in corner with hands just above shoulder level and feet ~6 in inches from corner, lean forward until a comfortable stretch is felt across chest. Hold 20-30 seconds.  Repeat 3 times per set. Do 1 sets per session. Do 2 sessions per day.     [email or web address]     Copyright © [employer]. All rights reserved.   Axial Extension (Chin Tuck)        Gently pull chin in while lengthening back of neck. Hold 5 seconds. Repeat 10 times. Do 2 sets, 1-2 sessions per day.                         Standing in corner with hands just above shoulder level and feet ____ inches from corner, lean forward until a comfortable stretch is felt across chest. Hold ____ seconds.  Repeat ____ times per set. Do ____ sets per session. Do ____ sessions per day.     [email or web address]     Copyright © [employer]. All rights reserved.

## 2019-07-15 ENCOUNTER — CLINICAL SUPPORT (OUTPATIENT)
Dept: REHABILITATION | Facility: HOSPITAL | Age: 59
End: 2019-07-15
Payer: OTHER MISCELLANEOUS

## 2019-07-15 DIAGNOSIS — H53.9 DIFFICULTY READING DUE TO VISUAL PROBLEM: ICD-10-CM

## 2019-07-15 DIAGNOSIS — H05.823 EXTRAOCULAR MUSCLE WEAKNESS OF BOTH EYES: ICD-10-CM

## 2019-07-15 PROCEDURE — 97112 NEUROMUSCULAR REEDUCATION: CPT | Mod: PN

## 2019-07-15 NOTE — PROGRESS NOTES
"  Occupational Therapy Daily Treatment Note     Date: 7/15/2019  Name: Octavia Arrington  Clinic Number: [id number]    Therapy Diagnosis:   Encounter Diagnoses   Name Primary?   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  she was not compliant with home exercise program given previously .  Response to previous treatment:  No adverse affects, reports after her PT visit she felt better after soft tissue management   Functional change: none     Pain: 0/10  Location: neck /right scapula    Objective     Octavia participated in neuromuscular re-education activities to improve: Coordination and strength for 37 minutes. The following activities were included:  -saccades vertical 60 BPM x 60 seconds-smooth motion throughout- c/o floaters lines and dots   -saccades horizonatal 65 BPM x 60 seconds smooth movement  - slight stinging  -diagonals 65 BPM x 60 seconds- up left down right , flutter of eyelids more frequent with downward gaze to right  -diagonals 65 BPM x 60 seconds - up right down left, sensation in scalp  -focus with head turn at 65 BPM with right neck soreness  -dot to dot page with 175 numbers each- completed in 3'45"    Moist heat applied to neck " after contraindications were cleared.    Home Exercises and Education Provided     Education provided:   - Pt encouraged to perform her eye exercises at home  - pt encouraged to f/u with her eye doctor and Dr. Cavazos    Written Home Exercises Provided: Patient instructed to cont prior HEP.-tracking program  Exercises were reviewed and Octavia was able to demonstrate them prior to the end of the session.  Octavia demonstrated good  understanding of the HEP provided.   .   See EMR under Patient Instructions for exercises provided prior visits.        Assessment     Pt would continue to benefit from skilled OT. Pt able to complete eye exercises with increase pace during diagonals with no reports of symptom changes.   Octavia is progressing slowly towards her goals and there are no updates to goals at this time. Pt prognosis is Good.     Pt will continue to benefit from skilled outpatient occupational therapy to address the deficits listed in the problem list on initial evaluation provide pt/family education and to maximize pt's level of independence in the home and community environment.     Anticipated barriers to occupational therapy: none noted    Pt's spiritual, cultural and educational needs considered and pt agreeable to plan of care and goals.    Goals:  Pt will perform her HEP daily    Pt will be able to perform 10 reps of tracking without a rest break- met 7/1/19  Pt will demonstrate convergence at appropriate distance of 6 cm.- not met  Pt will be able to read for 5 minutes without vision changes       Plan   Progress patients activities reps as tolerated to achieve goals      ZAFAR Temple

## 2019-07-18 ENCOUNTER — TELEPHONE (OUTPATIENT)
Dept: NEUROLOGY | Facility: CLINIC | Age: 59
End: 2019-07-18

## 2019-07-18 NOTE — TELEPHONE ENCOUNTER
----- Message from Lesli Jacobs sent at 7/18/2019  8:56 AM CDT -----  Contact: OREN ALCANTAR   Name of Who is Calling: OREN ALCANTAR       What is the request in detail: Patient is requesting a call back to schedule a follow up appointment       Can the clinic reply by [facility]: no      What Number to Call Back if not in [facility]: [phone]

## 2019-07-21 NOTE — PROGRESS NOTES
Occupational Therapy Daily Treatment Note     Date: 7/22/2019  Name: Octavia Arrington  Clinic Number: [id number]    Therapy Diagnosis:   Encounter Diagnoses   Name Primary?   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  she was not compliant with home exercise program given previously- performing a little bit .  Response to previous treatment:  No adverse affects,  Functional change: none     Pain: 0/10  Location: neck /right scapula    Objective     Octavia participated in neuromuscular re-education activities to improve: Coordination and strength for 20 minutes. The following activities were included:  -playing cards for saccades:   suits into 2 rows right and left                                                   Gathering all like numbers or face cards into two rows from four  -dot to dot olympic rings x 87 numbers.    Home Exercises and Education Provided     Education provided:   - Pt encouraged to perform her eye exercises at home  - pt encouraged again to f/u with her eye doctor and Dr. Cavazos    Written Home Exercises Provided: Patient instructed to cont prior HEP.-tracking program  Exercises  were reviewed and Octavia was able to demonstrate them prior to the end of the session.  Octavia demonstrated good  understanding of the HEP provided.   .   See EMR under Patient Instructions for exercises provided prior visits.        Assessment     Pt would continue to benefit from skilled OT. Pt with good functional saccades during table activities.  Pt continues with reports of HA. Pt has difficulty attending ontime due to care of her  after surgery.  Octavia is progressing slowly towards her goals and there are no updates to goals at this time. Pt prognosis is Good.     Pt will continue to benefit from skilled outpatient occupational therapy to address the deficits listed in the problem list on initial evaluation provide pt/family education and to maximize pt's level of independence in the home and community environment.     Anticipated barriers to occupational therapy: none noted    Pt's spiritual, cultural and educational needs considered and pt agreeable to plan of care and goals.    Goals:  Pt will perform her HEP daily    Pt will be able to perform 10 reps of tracking without a rest break- met 7/1/19  Pt will demonstrate convergence at appropriate distance of 6 cm.- not met  Pt will be able to read for 5 minutes without vision changes       Plan   Progress patients activities reps as tolerated to achieve goals      ZAFAR Temple

## 2019-07-22 ENCOUNTER — CLINICAL SUPPORT (OUTPATIENT)
Dept: REHABILITATION | Facility: HOSPITAL | Age: 59
End: 2019-07-22
Attending: PSYCHIATRY & NEUROLOGY
Payer: OTHER MISCELLANEOUS

## 2019-07-22 DIAGNOSIS — H53.9 DIFFICULTY READING DUE TO VISUAL PROBLEM: ICD-10-CM

## 2019-07-22 DIAGNOSIS — H05.823 EXTRAOCULAR MUSCLE WEAKNESS OF BOTH EYES: ICD-10-CM

## 2019-07-22 PROCEDURE — 97112 NEUROMUSCULAR REEDUCATION: CPT | Mod: PN

## 2019-07-26 ENCOUNTER — TELEPHONE (OUTPATIENT)
Dept: NEUROLOGY | Facility: CLINIC | Age: 59
End: 2019-07-26

## 2019-07-26 ENCOUNTER — CLINICAL SUPPORT (OUTPATIENT)
Dept: REHABILITATION | Facility: HOSPITAL | Age: 59
End: 2019-07-26
Payer: OTHER MISCELLANEOUS

## 2019-07-26 DIAGNOSIS — R29.898 DECREASED ROM OF NECK: ICD-10-CM

## 2019-07-26 DIAGNOSIS — H81.90 VESTIBULOPATHY, UNSPECIFIED LATERALITY: ICD-10-CM

## 2019-07-26 DIAGNOSIS — R53.1 DECREASED STRENGTH: ICD-10-CM

## 2019-07-26 PROCEDURE — 97112 NEUROMUSCULAR REEDUCATION: CPT | Mod: PN | Performed by: PHYSICAL THERAPIST

## 2019-07-26 PROCEDURE — 97140 MANUAL THERAPY 1/> REGIONS: CPT | Mod: PN | Performed by: PHYSICAL THERAPIST

## 2019-07-26 PROCEDURE — 97110 THERAPEUTIC EXERCISES: CPT | Mod: PN | Performed by: PHYSICAL THERAPIST

## 2019-07-26 NOTE — PROGRESS NOTES
"  Physical Therapy Daily Treatment Note     Name: Octavia Arrington  Clinic Number: [id number]    Therapy Diagnosis:    Decreased ROM of neck      Decreased strength      Vestibulopathy, unspecified laterality        Physician: Gerard Cavazos III, MD    Visit Date: 7/26/2019    Physician Orders: PT Eval and Treat, therapeutic exercise passive, active resistive, cervical traction  Medical Diagnosis from Referral:   S06.0X0D (ICD-10-CM) - Concussion without loss of consciousness, subsequent encounter   S13.4XXA (ICD-10-CM) - Whiplash, initial encounter   H81.90 (ICD-10-CM) - Vestibulopathy, unspecified laterality   F07.81 (ICD-10-CM) - Post-concussion syndrome   R51 (ICD-10-CM) - Cervicogenic headache   M54.81 (ICD-10-CM) - Bilateral occipital neuralgia         Evaluation Date: 6/28/2019  Authorization Period Expiration: 05/19/2020  Plan of Care Expiration: 8/23/19  Visit # / Visits authorized: 3/ 12     Time In: 7:40  Time Out: 8:10  Total Billable Time: 30 minutes- pt arrived late, unable to accomodate     Precautions: Standard      Subjective     Pt reports: pt reports her pain is subsiding, but is now reporting her pain as a "pull." pt reports continued dizziness affecting day to day activities.   She was compliant with home exercise program.  Response to previous treatment: decreased pain with active range of motion   Functional change: progressing towards goals.     Pain: 3-4/10 to start session, 2/10 at conclusion of session; R sided cervical and shoulder pain  Location: R sided cervical and shoulder region     Objective     Octavia received the following manual therapy techniques: Manual traction, Myofacial release and Soft tissue Mobilization were applied to the: Cervical region for 10 minutes, including:  Prone: soft tissue manipulation with Biofreeze to R upper quadrant: effleurage, kneading. Gentle muscle play of R upper trap and R Levator Scap .    Manual R upper trap release   Suboccipital release    Passive " ROM - sidebending bilateral 2 x 15 second holds w/ gentle distraction     Octavia received therapeutic exercises to develop strength, endurance, ROM, flexibility, posture and core stabilization for 12 minutes including:    Passive ROM - sidebending bilateral 2 x 15 second holds w/ gentle distraction    CERVICAL ROM Eval 7/26/19   Flexion 45 deg * 45 deg   Extension 46 deg* 40 deg   Rotation R = 21 deg *  L = 40 deg * R = 32 deg*  L = 40 deg*   Side Bending R = 20 deg *  L = 30 deg* R = 40 deg**  L = 30 deg   * increased pain (R side cervical region) with movement  ** pain anterior chest    Upper Extremity Strength    RUE  EVAL RUE   7/26/19 LUE  EVAL LUE  7/26/19   Gross shoulder:  See OT note  See OT note    Lats:  4-/5 3/5 4/5 4/5   Low traps:  3-/5 3-/5 3+/5 4+/5   Mid traps:  3+/5 3/5 3+/5 4+/5   Rhomboids:  3+/5 4+/5 3+/5 4+/5          Octavia participated in neuromuscular re-education activities to improve: Balance, Coordination, Kinesthetic, Sense, Proprioception and Posture for 8 minutes. The following activities were included:       eval 7/26/2019   Convergence point 29 cm 27 cm     Horizontal smooth pursuit - 2x 30 seconds - no eye slippage no excess blinkage  Horizontal saccades @ 85 bpm 2 x 30 seconds sitting w/ mild busy background pt had minimal blinking and one slip during 1st set      Home Exercises Provided and Patient Education Provided     Education provided:   - pt was educated on progress as compared to initial evaluation, added pencil push ups and scalene stretch to HEP    Written Home Exercises Provided: yes. And instructed to continue with previous HEP  Exercises were reviewed and Octavia was able to demonstrate them prior to the end of the session.  Octavia demonstrated good  understanding of the education provided.     See EMR under Patient Instructions for exercises provided 7/26/2019.        Assessment     Assessment Period: (6/28/19 to 7/26/19). Pt has only attended 2 physical therapy  sessions since evaluation, including today's visit, due to her  recently becoming sick. Since evaluation, pt has reported decreased neck pain, and has demonstrated improvements in cervical range of motion, particularly, R side bending and R sided rotation. Pt continues to report R sided cervical and shoulder pain. This pain increases with R rotation. Pt reports continued intermittent headaches at top of cranium. Cervical active range of motion for extension, flexion, and rotation are still significantly limited for daily tasks including driving.   Pt has shown improvements with saccades by 20 bpm, for which continued improvement will help with overall dizziness. Pt's also showed improvement with convergence point by 2cm. Pt was given a home exercise today to improve convergence point.  Pt has demonstrated decline in R scapular strength, particularly Latissimus dorsi and mid trapezius; pt will receive HEP on next visit to target scapular muscle strength. Pt will continue to improve with skilled PT services to address remaining impairments in strength, pain, and dizziness/ imbalance to return to her prior level of function.      Octavia is progressing well towards her goals.   Pt prognosis is Good.     Pt will continue to benefit from skilled outpatient physical therapy to address the deficits listed in the problem list box on initial evaluation, provide pt/family education and to maximize pt's level of independence in the home and community environment.     Pt's spiritual, cultural and educational needs considered and pt agreeable to plan of care and goals.     Anticipated barriers to physical therapy: trouble following directions, hypersensitivity, significant muscle guarding, significant anticipation to pain    Goals:  Short term goals: 4 weeks, pt agrees to goals set.              1.         Pt will demonstrate understanding and independence with HEP to progress towards functional goals and improve carryover  of progress made. ongoing  2.         Pt will report max pain in neck less than 4/10 to demonstrate decreased muscle tone and improved quality of life. MET (7/26/19) PT REPORTED 3/10  3.         Pt will demonstrate cervical flexion/extension improvement in active range of motion by 5 degrees each to show decreased muscle tone and pain to improve ability to move head (I.e. During Functional mobility). Ongoing (flexion remained same, extension decreased by 5 degrees)  4.         Pt will demonstrate improved R cervical side bending active range of motion to 30 degrees to show decreased muscle tone and decrease neck pain. MET (40 deg 7/26/19)  5.         Pt will score MMT 4/5 bilateral Latissimus Dorsi muscle to show improvement in functional scapular strength and decrease shoulder elevation to inhibit muscle tone ongoing (Scored 3/5 on R Lat 7/26/19)  6.         Pt will score MMT 4/5 bilateral Lower Trapezius muscle to show improvement in functional scapular strength to improve posture/ decrease pain and headaches. Ongoing (scored 3/5 on R low trap 7/26/19)  7.         Assess Functional Gait Assessment to determine fall risk as tolerated and set appropriate goals. ongoing  8.         Pt will demonstrate improved cervical AROM CLEMENT rotation to 45 deg to demonstrate improved cervical motor control and improve range of Motion for driving. Improved (R rotation improved to 32 deg, L remained at 40 deg 7/26/19)     Long term goals: 8 weeks, pt agrees to goals set  9.   Pt will report max pain in neck less than 2/10 to show decreased tone and improved ability to return to activities. ongoing  10. Pt will demonstrate cervical extension improvement in ROM to 55 degrees to demonstrate improved motor control of cervical motions and decrease pain in      neck. ongoing  11. Pt will balance on foam mat with feet together and eyes closed for at least 30 seconds to be able to ambulate in multi-surfaced community without risk of           falling. ongoing  12. Pt will demonstrate improved convergence point to 10cm to demonstrate improved oculomotor control and improve balance with changing levels (I.e stair negotiation). ongoing  13. Pt will demonstrate improved cervical AROM CLEMENT to 65 deg to demonstrate improved cervical motor control and improve to functional range of Motion needed for driving. ongoing       Plan     Continue cervical stretches, deep neck flexor strengthening, and scapular strengthening, give pt scapular strengthening HEP next follow up visit,.     Bruno Jimenes, SPT    I, Merari Dougherty, JONAHT, certify that I was present in the room directing the student in service delivery and guiding them using my skilled judgment. As the co-signing therapist I have reviewed the students documentation and am responsible for the treatment, assessment, and plan.     Merari Dougherty DPT  7/26/2019

## 2019-07-26 NOTE — TELEPHONE ENCOUNTER
----- Message from Nidhi Rhoades sent at 7/26/2019  3:55 PM CDT -----  Contact: Pt. [phone]  Needs Advice    Reason for call: The patient would like to speak to someone regarding he follow up visit. Please contact the patient to discuss further.          Communication Preference: PHONE     Additional Information:

## 2019-07-26 NOTE — TELEPHONE ENCOUNTER
----- Message from Bertha López sent at 7/26/2019  3:29 PM CDT -----  Contact: Pt   Name of Who is Calling: OREN ALCANTAR [[id number]]    What is the request in detail:Pt is requesting a call back regarding getting scheduled pt states she has left several message and no one has called her pt is requesting to speak with  and Harvey pt states she wants a phone call not on the [facility].......  Please contact to further discuss and advise      [facility]     What Number to Call Back if not in [facility]:  [phone]

## 2019-07-26 NOTE — PATIENT INSTRUCTIONS
PENCIL PUSH UPS  Pencil push-ups are a visual exercise used to treat convergence insufficiency. If you suffer from convergence insufficiency, your eye muscles are weak, leaving you unable to focus on objects as you move toward them. The resulting faulty alignment can leave you with blurred vision, double vision, and headaches. Convergence insufficiency is most common in teenagers and young adults. Pencil push-ups serve to strengthen the eye muscles and gradually improve their ability to align with objects and each other.  Things You'll Need: Pencil   Hold a pencil, with the tip facing up, at arm's length in front of your face. Focus your eyes on the tip of the pencil.   Bring the pencil toward your nose slowly. Continue to focus on the pencil tip.   Note when instead of seeing one pencil, you see two pencils. Hold the pencil still and focus on it for 10 seconds.   Move the pencil slowly back to its original position.   Repeat the process for 1 minute.   Take a 1-minute break to rest your eyes.   Resume the pencil push-ups. Do three sets total of the pencil push-ups. Each set should consist of moving and focusing on the pencil for 1 minute and resting your eyes for 1 minute.   Repeat the three sets of pencil push-ups two to four times daily or as directed by your doctor.  Tips & Warnings   Do not do more pencil push-ups than you are instructed to do by your . Too many pencil push-ups can strain your eyes.

## 2019-07-26 NOTE — PLAN OF CARE
"  Physical Therapy Daily Treatment Note     Name: Octavia Arrington  Clinic Number: [id number]    Therapy Diagnosis:    Decreased ROM of neck      Decreased strength      Vestibulopathy, unspecified laterality        Physician: Gerard Cavazos III, MD    Visit Date: 7/26/2019    Physician Orders: PT Eval and Treat, therapeutic exercise passive, active resistive, cervical traction  Medical Diagnosis from Referral:   S06.0X0D (ICD-10-CM) - Concussion without loss of consciousness, subsequent encounter   S13.4XXA (ICD-10-CM) - Whiplash, initial encounter   H81.90 (ICD-10-CM) - Vestibulopathy, unspecified laterality   F07.81 (ICD-10-CM) - Post-concussion syndrome   R51 (ICD-10-CM) - Cervicogenic headache   M54.81 (ICD-10-CM) - Bilateral occipital neuralgia         Evaluation Date: 6/28/2019  Authorization Period Expiration: 05/19/2020  Plan of Care Expiration: 8/23/19  Visit # / Visits authorized: 3/ 12     Time In: 7:40  Time Out: 8:10  Total Billable Time: 30 minutes- pt arrived late, unable to accomodate     Precautions: Standard      Subjective     Pt reports: pt reports her pain is subsiding, but is now reporting her pain as a "pull." pt reports continued dizziness affecting day to day activities.   She was compliant with home exercise program.  Response to previous treatment: decreased pain with active range of motion   Functional change: progressing towards goals.     Pain: 3-4/10 to start session, 2/10 at conclusion of session; R sided cervical and shoulder pain  Location: R sided cervical and shoulder region     Objective     Octavia received the following manual therapy techniques: Manual traction, Myofacial release and Soft tissue Mobilization were applied to the: Cervical region for 10 minutes, including:  Prone: soft tissue manipulation with Biofreeze to R upper quadrant: effleurage, kneading. Gentle muscle play of R upper trap and R Levator Scap .    Manual R upper trap release   Suboccipital release    Passive " ROM - sidebending bilateral 2 x 15 second holds w/ gentle distraction     Octavia received therapeutic exercises to develop strength, endurance, ROM, flexibility, posture and core stabilization for 12 minutes including:    Passive ROM - sidebending bilateral 2 x 15 second holds w/ gentle distraction    CERVICAL ROM Eval 7/26/19   Flexion 45 deg * 45 deg   Extension 46 deg* 40 deg   Rotation R = 21 deg *  L = 40 deg * R = 32 deg*  L = 40 deg*   Side Bending R = 20 deg *  L = 30 deg* R = 40 deg**  L = 30 deg   * increased pain (R side cervical region) with movement  ** pain anterior chest    Upper Extremity Strength    RUE  EVAL RUE   7/26/19 LUE  EVAL LUE  7/26/19   Gross shoulder:  See OT note  See OT note    Lats:  4-/5 3/5 4/5 4/5   Low traps:  3-/5 3-/5 3+/5 4+/5   Mid traps:  3+/5 3/5 3+/5 4+/5   Rhomboids:  3+/5 4+/5 3+/5 4+/5          Octavia participated in neuromuscular re-education activities to improve: Balance, Coordination, Kinesthetic, Sense, Proprioception and Posture for 8 minutes. The following activities were included:       eval 7/26/2019   Convergence point 29 cm 27 cm     Horizontal smooth pursuit - 2x 30 seconds - no eye slippage no excess blinkage  Horizontal saccades @ 85 bpm 2 x 30 seconds sitting w/ mild busy background pt had minimal blinking and one slip during 1st set      Home Exercises Provided and Patient Education Provided     Education provided:   - pt was educated on progress as compared to initial evaluation, added pencil push ups and scalene stretch to HEP    Written Home Exercises Provided: yes. And instructed to continue with previous HEP  Exercises were reviewed and Octavia was able to demonstrate them prior to the end of the session.  Octavia demonstrated good  understanding of the education provided.     See EMR under Patient Instructions for exercises provided 7/26/2019.        Assessment     Assessment Period: (6/28/19 to 7/26/19). Pt has only attended 2 physical therapy  sessions since evaluation, including today's visit, due to her  recently becoming sick. Since evaluation, pt has reported decreased neck pain, and has demonstrated improvements in cervical range of motion, particularly, R side bending and R sided rotation. Pt continues to report R sided cervical and shoulder pain. This pain increases with R rotation. Pt reports continued intermittent headaches at top of cranium. Cervical active range of motion for extension, flexion, and rotation are still significantly limited for daily tasks including driving.   Pt has shown improvements with saccades by 20 bpm, for which continued improvement will help with overall dizziness. Pt's also showed improvement with convergence point by 2cm. Pt was given a home exercise today to improve convergence point.  Pt has demonstrated decline in R scapular strength, particularly Latissimus dorsi and mid trapezius; pt will receive HEP on next visit to target scapular muscle strength. Pt will continue to improve with skilled PT services to address remaining impairments in strength, pain, and dizziness/ imbalance to return to her prior level of function.      Octavia is progressing well towards her goals.   Pt prognosis is Good.     Pt will continue to benefit from skilled outpatient physical therapy to address the deficits listed in the problem list box on initial evaluation, provide pt/family education and to maximize pt's level of independence in the home and community environment.     Pt's spiritual, cultural and educational needs considered and pt agreeable to plan of care and goals.     Anticipated barriers to physical therapy: trouble following directions, hypersensitivity, significant muscle guarding, significant anticipation to pain    Goals:  Short term goals: 4 weeks, pt agrees to goals set.              1.         Pt will demonstrate understanding and independence with HEP to progress towards functional goals and improve carryover  of progress made. ongoing  2.         Pt will report max pain in neck less than 4/10 to demonstrate decreased muscle tone and improved quality of life. MET (7/26/19) PT REPORTED 3/10  3.         Pt will demonstrate cervical flexion/extension improvement in active range of motion by 5 degrees each to show decreased muscle tone and pain to improve ability to move head (I.e. During Functional mobility). Ongoing (flexion remained same, extension decreased by 5 degrees)  4.         Pt will demonstrate improved R cervical side bending active range of motion to 30 degrees to show decreased muscle tone and decrease neck pain. MET (40 deg 7/26/19)  5.         Pt will score MMT 4/5 bilateral Latissimus Dorsi muscle to show improvement in functional scapular strength and decrease shoulder elevation to inhibit muscle tone ongoing (Scored 3/5 on R Lat 7/26/19)  6.         Pt will score MMT 4/5 bilateral Lower Trapezius muscle to show improvement in functional scapular strength to improve posture/ decrease pain and headaches. Ongoing (scored 3/5 on R low trap 7/26/19)  7.         Assess Functional Gait Assessment to determine fall risk as tolerated and set appropriate goals. ongoing  8.         Pt will demonstrate improved cervical AROM CLEMENT rotation to 45 deg to demonstrate improved cervical motor control and improve range of Motion for driving. Improved (R rotation improved to 32 deg, L remained at 40 deg 7/26/19)     Long term goals: 8 weeks, pt agrees to goals set  9.   Pt will report max pain in neck less than 2/10 to show decreased tone and improved ability to return to activities. ongoing  10. Pt will demonstrate cervical extension improvement in ROM to 55 degrees to demonstrate improved motor control of cervical motions and decrease pain in      neck. ongoing  11. Pt will balance on foam mat with feet together and eyes closed for at least 30 seconds to be able to ambulate in multi-surfaced community without risk of           falling. ongoing  12. Pt will demonstrate improved convergence point to 10cm to demonstrate improved oculomotor control and improve balance with changing levels (I.e stair negotiation). ongoing  13. Pt will demonstrate improved cervical AROM CLEMENT to 65 deg to demonstrate improved cervical motor control and improve to functional range of Motion needed for driving. ongoing       Plan     Continue cervical stretches, deep neck flexor strengthening, and scapular strengthening, give pt scapular strengthening HEP next follow up visit,.     Bruno Jimenes, SPT    I, Merari Dougherty, JONAHT, certify that I was present in the room directing the student in service delivery and guiding them using my skilled judgment. As the co-signing therapist I have reviewed the students documentation and am responsible for the treatment, assessment, and plan.     Merari Dougherty DPT  7/26/2019

## 2019-07-29 ENCOUNTER — CLINICAL SUPPORT (OUTPATIENT)
Dept: REHABILITATION | Facility: HOSPITAL | Age: 59
End: 2019-07-29
Payer: OTHER MISCELLANEOUS

## 2019-07-29 DIAGNOSIS — H53.9 DIFFICULTY READING DUE TO VISUAL PROBLEM: ICD-10-CM

## 2019-07-29 DIAGNOSIS — H05.823 EXTRAOCULAR MUSCLE WEAKNESS OF BOTH EYES: ICD-10-CM

## 2019-07-29 PROCEDURE — 97112 NEUROMUSCULAR REEDUCATION: CPT | Mod: PN

## 2019-07-29 NOTE — PROGRESS NOTES
Occupational Therapy Daily Treatment Note     Date: 7/29/2019  Name: Octavia Arrington  Clinic Number: [id number]    Therapy Diagnosis:   Encounter Diagnoses   Name Primary?   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  I thought it may be sinus but the sinus medicine didn't help.  Excedrine migraine sometimes helps it.   she was not compliant with home exercise program given previously- performing a little bit .  Response to previous treatment:  No adverse affects,  Functional change: none     Pain: 0/10  Location: neck /right scapula    Objective     Octavia participated in neuromuscular re-education activities to improve: Coordination and strength for 30 minutes. The following activities were included:  -focus on word 65 bpm x 60 seconds  -diagonal saccades right and left   -playing cards for saccades:  layed out face up- stack in order at 4 corners of table   -complex dot to dot  Small numbers in groupings - pt required increased time for completion due to complexity- no reports of increased symptoms.     Home Exercises and Education Provided     Education provided:   - Pt encouraged to perform her eye exercises at home    Written  Home Exercises Provided: Patient instructed to cont prior HEP.-tracking program  Exercises were reviewed and Octavia was able to demonstrate them prior to the end of the session.  Octavia demonstrated good  understanding of the HEP provided.   .   See EMR under Patient Instructions for exercises provided prior visits.        Assessment     Pt would continue to benefit from skilled OT. Pt has persistent HA in right  coronal area, pt functions well with seated tasks in clinic.     Octavia is progressing slowly towards her goals and there are no updates to goals at this time. Pt prognosis is Good.     Pt will continue to benefit from skilled outpatient occupational therapy to address the deficits listed in the problem list on initial evaluation provide pt/family education and to maximize pt's level of independence in the home and community environment.     Anticipated barriers to occupational therapy: none noted    Pt's spiritual, cultural and educational needs considered and pt agreeable to plan of care and goals.    Goals:  Pt will perform her HEP daily    Pt will be able to perform 10 reps of tracking without a rest break- met 7/1/19  Pt will demonstrate convergence at appropriate distance of 6 cm.- not met  Pt will be able to read for 5 minutes without vision changes       Plan   Progress patients activities reps as tolerated to achieve goals      ZAFAR Temple

## 2019-07-30 ENCOUNTER — CLINICAL SUPPORT (OUTPATIENT)
Dept: REHABILITATION | Facility: HOSPITAL | Age: 59
End: 2019-07-30
Payer: OTHER MISCELLANEOUS

## 2019-07-30 DIAGNOSIS — H81.90 VESTIBULOPATHY, UNSPECIFIED LATERALITY: ICD-10-CM

## 2019-07-30 DIAGNOSIS — R53.1 DECREASED STRENGTH: ICD-10-CM

## 2019-07-30 DIAGNOSIS — R29.898 DECREASED ROM OF NECK: ICD-10-CM

## 2019-07-30 PROCEDURE — 97110 THERAPEUTIC EXERCISES: CPT | Mod: PN | Performed by: PHYSICAL THERAPIST

## 2019-07-30 NOTE — PROGRESS NOTES
"  Physical Therapy Daily Treatment Note     Name: Octavia Arrington  Clinic Number: [id number]    Therapy Diagnosis:    Decreased ROM of neck      Decreased strength      Vestibulopathy, unspecified laterality        Physician: Gerard Cavazos III, MD    Visit Date: 7/30/2019    Physician Orders: PT Eval and Treat, therapeutic exercise passive, active resistive, cervical traction  Medical Diagnosis from Referral:   S06.0X0D (ICD-10-CM) - Concussion without loss of consciousness, subsequent encounter   S13.4XXA (ICD-10-CM) - Whiplash, initial encounter   H81.90 (ICD-10-CM) - Vestibulopathy, unspecified laterality   F07.81 (ICD-10-CM) - Post-concussion syndrome   R51 (ICD-10-CM) - Cervicogenic headache   M54.81 (ICD-10-CM) - Bilateral occipital neuralgia         Evaluation Date: 6/28/2019  Authorization Period Expiration: 05/19/2020  Plan of Care Expiration: 8/23/19  Visit # / Visits authorized: 4/ 12     Time In: 7:32  Time Out: 8:15  Total Billable Time: 43 minutes     Precautions: Standard      Subjective     Pt reports: she does not have "headaches", but has a feeling of pressure on top of her head.   She was compliant with home exercise program.  Response to previous treatment: no adverse effects reported  Functional change: progressing towards goals- decreased cervical/ shoulder pain    Pain: none reported today  Location: R sided cervical and shoulder region     Objective       Octavia received therapeutic exercises to develop strength, endurance, ROM, flexibility, posture and core stabilization for 43 minutes including:    Supine:   Suboccipital release  Passive range of motion stretches + slight traction: 2x45 sec for the following   R/L sidebending    R/L rotation    Supine chin tucks 1 x 20  pec stretch in corner 3 x 30 seconds -   Scapular retraction with yellow theraband 2 x 10 --> pt required manual cueing to not hike R shoulder  Wall Slides 2 x 10 - pt required manual cueing to not hike R shoulder\  Standing " Latissimus dorsi: scap setting + shoulder extension + internal rotation 1 x 10 w/ yellow theraband  Standing mid traps: horizontal abduction w/ yellow theraband 1 x 10     Octavia participated in neuromuscular re-education activities to improve: Balance, Coordination, Kinesthetic, Sense, Proprioception and Posture for 10 minutes. The following activities were included:    Horizontal smooth pursuit - 2x 30 seconds - early eye slippage on first set  Vertical smooth pursuit 2 x 30 second no eye slippage  Horizontal saccades @ 80 bpm 2 x 30 seconds sitting --> pt requires verbal cueing to not cervically extend    Moist Hot Pack  R. posterior cervical region during therex above, except for supine chin tucks     Home Exercises Provided and Patient Education Provided     Education provided:   - pt was educated on not shrugging shoulders when performing scapular retractions    Written Home Exercises Provided: yes.  Exercises were reviewed and Octavia was able to demonstrate them prior to the end of the session.  Octavia demonstrated good  understanding of the education provided.      See EMR under Patient Instructions for exercises provided 6/28/2019.    Assessment     Pt tolerated PT session very well today. Pt demonstrated decreased sensitivity and muscle guarding in the R cervical region. Pt stated she is beginning to feel a lot better and in less pain throughout the day. Pt reports the only time she gets dizzy is if she is bending down to pick something up off floor and standing back upright. Pt was able to perform all therex with no pain and all neumuscular re-education with no dizziness reported. Pt increased her horizontal saccades frequency to 80bpm, indicating improvements in vestibular system. Pt will continue to improve with skilled physical therapy services.     Octavia is progressing well towards her goals.   Pt prognosis is Good.     Pt will continue to benefit from skilled outpatient physical therapy to address  the deficits listed in the problem list box on initial evaluation, provide pt/family education and to maximize pt's level of independence in the home and community environment.     Pt's spiritual, cultural and educational needs considered and pt agreeable to plan of care and goals.     Anticipated barriers to physical therapy: trouble following directions, hypersensitivity, significant muscle guarding, significant anticipation to pain    Goals:  Short term goals: 4 weeks, pt agrees to goals set.              1.         Pt will demonstrate understanding and independence with HEP to progress towards functional goals and improve carryover of progress made. ongoing  2.         Pt will report max pain in neck less than 4/10 to demonstrate decreased muscle tone and improved quality of life. ongoing  3.         Pt will demonstrate cervical flexion/extension improvement in active range of motion by 5 degrees each to show decreased muscle tone and pain to improve ability to move head (I.e. During Functional mobility). ongoing  4.         Pt will demonstrate improved R cervical side bending active range of motion to 30 degrees to show decreased muscle tone and decrease neck pain. ongoing  5.         Pt will score MMT 4/5 bilateral Latissimus Dorsi muscle to show improvement in functional scapular strength and decrease shoulder elevation to inhibit muscle tone ongoing  6.         Pt will score MMT 4/5 bilateral Lower Trapezius muscle to show improvement in functional scapular strength to improve posture/ decrease pain and headaches. ongoing  7.         Assess Functional Gait Assessment to determine fall risk as tolerated and set appropriate goals. ongoing  8.         Pt will demonstrate improved cervical AROM CLEMENT to 45 deg to demonstrate improved cervical motor control and improve range of Motion for driving. ongoing     Long term goals: 8 weeks, pt agrees to goals set  9.   Pt will report max pain in neck less than 2/10 to  show decreased tone and improved ability to return to activities. ongoing  10. Pt will demonstrate cervical extension improvement in ROM to 55 degrees to demonstrate improved motor control of cervical motions and decrease pain in      neck. ongoing  11. Pt will balance on foam mat with feet together and eyes closed for at least 30 seconds to be able to ambulate in multi-surfaced community without risk of          falling. ongoing  12. Pt will demonstrate improved convergence point to 10cm to demonstrate improved oculomotor control and improve balance with changing levels (I.e stair negotiation). ongoing  13. Pt will demonstrate improved cervical AROM CLEMENT to 65 deg to demonstrate improved cervical motor control and improve to functional range of Motion needed for driving. ongoing       Plan     Continue cervical stretches, deep neck flexor strengthening, and scapular strengthening, and assess for manual therapy as needed    Bruno Jimenes, DUDLEY    I, Merari Dougherty DPT, certify that I was present in the room directing the student in service delivery and guiding them using my skilled judgment. As the co-signing therapist I have reviewed the students documentation and am responsible for the treatment, assessment, and plan.     Merari Dougherty DPT  7/30/2019

## 2019-08-04 NOTE — PROGRESS NOTES
Occupational Therapy Daily Treatment Note     Date: 8/5/2019  Name: Octavia Arrington  Clinic Number: [id number]    Therapy Diagnosis:   Encounter Diagnoses   Name Primary?   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   she was  compliant with home exercise program given previously.  Response to previous treatment:  No adverse affects,  Functional change: none     Pain: 0/10  Location: neck and head    Objective     Octavia participated in neuromuscular re-education activities to improve: Coordination and strength for 30 minutes. The following activities were included:  Saccades in combinations:  4 cards in square corners:  Clockwise, counterclockwise,  Bottom to top, diagonals bottom to top and top bottom    -complex dot to dot  Small numbers in groupings -pt jumped number sequences as she could not find continuous numbers.     Home Exercises and Education Provided     Education provided:   - continue HEP    Written Home Exercises Provided: Patient instructed to cont prior HEP.-tracking program and saccades  Exercises were reviewed and Octavia was able to demonstrate them prior to the end of the session.  Octavia demonstrated good   understanding of the HEP provided.   .   See EMR under Patient Instructions for exercises provided prior visits.        Assessment     Pt would continue to benefit from skilled OT. Pt is having less overall somatic symptoms but has persistent pain / sensation in her head.  Pt arrived to therapy without her reading glasses.  Pt has difficulty located small font numbers in dot to dot activity sheet.     Octavia is progressing slowly towards her goals and there are no updates to goals at this time. Pt prognosis is Good.     Pt will continue to benefit from skilled outpatient occupational therapy to address the deficits listed in the problem list on initial evaluation provide pt/family education and to maximize pt's level of independence in the home and community environment.     Anticipated barriers to occupational therapy: none noted    Pt's spiritual, cultural and educational needs considered and pt agreeable to plan of care and goals.    Goals:  Pt will perform her HEP daily    Pt will be able to perform 10 reps of tracking without a rest break- met 7/1/19  Pt will demonstrate convergence at appropriate distance of 6 cm.- not met  Pt will be able to read for 5 minutes without vision changes       Plan   Progress patients activities reps as tolerated to achieve goals      ZAFAR Temple

## 2019-08-05 ENCOUNTER — CLINICAL SUPPORT (OUTPATIENT)
Dept: REHABILITATION | Facility: HOSPITAL | Age: 59
End: 2019-08-05
Payer: OTHER MISCELLANEOUS

## 2019-08-05 DIAGNOSIS — H05.823 EXTRAOCULAR MUSCLE WEAKNESS OF BOTH EYES: ICD-10-CM

## 2019-08-05 DIAGNOSIS — H53.9 DIFFICULTY READING DUE TO VISUAL PROBLEM: ICD-10-CM

## 2019-08-05 PROCEDURE — 97112 NEUROMUSCULAR REEDUCATION: CPT | Mod: PN

## 2019-08-06 ENCOUNTER — CLINICAL SUPPORT (OUTPATIENT)
Dept: REHABILITATION | Facility: HOSPITAL | Age: 59
End: 2019-08-06
Payer: OTHER MISCELLANEOUS

## 2019-08-06 DIAGNOSIS — H81.90 VESTIBULOPATHY, UNSPECIFIED LATERALITY: ICD-10-CM

## 2019-08-06 DIAGNOSIS — R53.1 DECREASED STRENGTH: ICD-10-CM

## 2019-08-06 DIAGNOSIS — R29.898 DECREASED ROM OF NECK: ICD-10-CM

## 2019-08-06 PROCEDURE — 97110 THERAPEUTIC EXERCISES: CPT | Mod: PN

## 2019-08-06 NOTE — PROGRESS NOTES
"  Physical Therapy Daily Treatment Note     Name: Octavia Arrington  Clinic Number: [id number]    Therapy Diagnosis:    Decreased ROM of neck      Decreased strength      Vestibulopathy, unspecified laterality        Physician: Gerard Cavazos III, MD    Visit Date: 8/6/2019    Physician Orders: PT Eval and Treat, therapeutic exercise passive, active resistive, cervical traction  Medical Diagnosis from Referral:   S06.0X0D (ICD-10-CM) - Concussion without loss of consciousness, subsequent encounter   S13.4XXA (ICD-10-CM) - Whiplash, initial encounter   H81.90 (ICD-10-CM) - Vestibulopathy, unspecified laterality   F07.81 (ICD-10-CM) - Post-concussion syndrome   R51 (ICD-10-CM) - Cervicogenic headache   M54.81 (ICD-10-CM) - Bilateral occipital neuralgia         Evaluation Date: 6/28/2019  Authorization Period Expiration: 05/19/2020  Plan of Care Expiration: 8/23/19  Visit # / Visits authorized: 5/ 12     Time In: 4:00pm  Time Out: 5:05pm  Total Billable Time: 30 minutes     Precautions: Standard      Subjective     Pt reports: she c/o mild headache and minimal R cervical pain  She was compliant with home exercise program.  Response to previous treatment: no adverse effects reported  Functional change: progressing towards goals- decreased cervical/ shoulder pain    Pain: 2/10  Location: R sided cervical and shoulder region     Objective       Octavia received therapeutic exercises to develop strength, endurance, ROM, flexibility, posture and core stabilization for 55 minutes including:    Supine:   Suboccipital release  Passive range of motion + slight traction:   R/L sidebending    R/L rotation   Manual UT stretch    Supine chin tucks 10 x 2" hold  +Supine occiput float cervical flex/ext x 15  +Supine occiput float cervical rotation x 15  +pulleys x 3'  pec stretch in corner 3 x 30 seconds    Scapular retraction with yellow theraband 2 x 10   Wall Slides 2 x 10  Standing Latissimus dorsi: 2x 10 w/ yellow theraband  Standing " mid traps: horizontal abduction w/ yellow theraband  x 10   +Standing shoulder ext with yellow TB x 10    Octavia participated in neuromuscular re-education activities to improve: Balance, Coordination, Kinesthetic, Sense, Proprioception and Posture for 0 minutes. The following activities were included:    Horizontal smooth pursuit - 2x 30 seconds - early eye slippage on first set  Vertical smooth pursuit 2 x 30 second no eye slippage  Horizontal saccades @ 80 bpm 2 x 30 seconds sitting --> pt requires verbal cueing to not cervically extend    Moist Hot Pack  R. posterior cervical region post treatment session x 10 minutes.    Home Exercises Provided and Patient Education Provided     Education provided:   - pt was educated on not shrugging shoulders when performing scapular retractions    Written Home Exercises Provided: yes.  Exercises were reviewed and Octavia was able to demonstrate them prior to the end of the session.  Octavia demonstrated good  understanding of the education provided.      See EMR under Patient Instructions for exercises provided 6/28/2019.    Assessment     Pt tolerated PT session well today. Pt demonstrated decreased sensitivity and muscle guarding in the R cervical region. Pt stated she is beginning to feel a lot better and in less pain throughout the day. Continues to reports the increase in symptoms and dizziness when bending or transitioning from supine to sit. Pt was able to perform additional therex with no pain or dizziness reported. Pt returned to work this week and told to monitor changes in symptoms. Pt will continue to improve with skilled physical therapy services.     Octavia is progressing well towards her goals.   Pt prognosis is Good.     Pt will continue to benefit from skilled outpatient physical therapy to address the deficits listed in the problem list box on initial evaluation, provide pt/family education and to maximize pt's level of independence in the home and community  environment.     Pt's spiritual, cultural and educational needs considered and pt agreeable to plan of care and goals.     Anticipated barriers to physical therapy: trouble following directions, hypersensitivity, significant muscle guarding, significant anticipation to pain    Goals:  Short term goals: 4 weeks, pt agrees to goals set.              1.         Pt will demonstrate understanding and independence with HEP to progress towards functional goals and improve carryover of progress made. ongoing  2.         Pt will report max pain in neck less than 4/10 to demonstrate decreased muscle tone and improved quality of life. ongoing  3.         Pt will demonstrate cervical flexion/extension improvement in active range of motion by 5 degrees each to show decreased muscle tone and pain to improve ability to move head (I.e. During Functional mobility). ongoing  4.         Pt will demonstrate improved R cervical side bending active range of motion to 30 degrees to show decreased muscle tone and decrease neck pain. ongoing  5.         Pt will score MMT 4/5 bilateral Latissimus Dorsi muscle to show improvement in functional scapular strength and decrease shoulder elevation to inhibit muscle tone ongoing  6.         Pt will score MMT 4/5 bilateral Lower Trapezius muscle to show improvement in functional scapular strength to improve posture/ decrease pain and headaches. ongoing  7.         Assess Functional Gait Assessment to determine fall risk as tolerated and set appropriate goals. ongoing  8.         Pt will demonstrate improved cervical AROM CLEMENT to 45 deg to demonstrate improved cervical motor control and improve range of Motion for driving. ongoing     Long term goals: 8 weeks, pt agrees to goals set  9.   Pt will report max pain in neck less than 2/10 to show decreased tone and improved ability to return to activities. ongoing  10. Pt will demonstrate cervical extension improvement in ROM to 55 degrees to demonstrate  improved motor control of cervical motions and decrease pain in      neck. ongoing  11. Pt will balance on foam mat with feet together and eyes closed for at least 30 seconds to be able to ambulate in multi-surfaced community without risk of          falling. ongoing  12. Pt will demonstrate improved convergence point to 10cm to demonstrate improved oculomotor control and improve balance with changing levels (I.e stair negotiation). ongoing  13. Pt will demonstrate improved cervical AROM CLEMENT to 65 deg to demonstrate improved cervical motor control and improve to functional range of Motion needed for driving. ongoing       Plan     Continue cervical stretches, deep neck flexor strengthening, and scapular strengthening, and assess for manual therapy as needed      Lani Munoz, PT, DPT  8/6/2019

## 2019-08-08 ENCOUNTER — DOCUMENTATION ONLY (OUTPATIENT)
Dept: REHABILITATION | Facility: HOSPITAL | Age: 59
End: 2019-08-08

## 2019-08-09 ENCOUNTER — CLINICAL SUPPORT (OUTPATIENT)
Dept: REHABILITATION | Facility: HOSPITAL | Age: 59
End: 2019-08-09
Payer: OTHER MISCELLANEOUS

## 2019-08-09 ENCOUNTER — OFFICE VISIT (OUTPATIENT)
Dept: NEUROLOGY | Facility: CLINIC | Age: 59
End: 2019-08-09
Payer: OTHER MISCELLANEOUS

## 2019-08-09 VITALS
SYSTOLIC BLOOD PRESSURE: 159 MMHG | DIASTOLIC BLOOD PRESSURE: 98 MMHG | BODY MASS INDEX: 41.63 KG/M2 | HEIGHT: 66 IN | HEART RATE: 86 BPM | WEIGHT: 259.06 LBS

## 2019-08-09 DIAGNOSIS — H81.90 VESTIBULOPATHY, UNSPECIFIED LATERALITY: ICD-10-CM

## 2019-08-09 DIAGNOSIS — G44.86 CERVICOGENIC HEADACHE: ICD-10-CM

## 2019-08-09 DIAGNOSIS — S06.0X0D CONCUSSION WITHOUT LOSS OF CONSCIOUSNESS, SUBSEQUENT ENCOUNTER: Primary | ICD-10-CM

## 2019-08-09 DIAGNOSIS — F07.81 POST-CONCUSSION SYNDROME: ICD-10-CM

## 2019-08-09 DIAGNOSIS — G43.009 MIGRAINE WITHOUT AURA AND WITHOUT STATUS MIGRAINOSUS, NOT INTRACTABLE: ICD-10-CM

## 2019-08-09 DIAGNOSIS — R53.1 DECREASED STRENGTH: ICD-10-CM

## 2019-08-09 DIAGNOSIS — S13.4XXD WHIPLASH INJURY TO NECK, SUBSEQUENT ENCOUNTER: ICD-10-CM

## 2019-08-09 DIAGNOSIS — H51.9 CONVERGENCE INSUFFICIENCY OR PALSY IN BINOCULAR EYE MOVEMENT: ICD-10-CM

## 2019-08-09 DIAGNOSIS — M54.81 BILATERAL OCCIPITAL NEURALGIA: ICD-10-CM

## 2019-08-09 DIAGNOSIS — R29.898 DECREASED ROM OF NECK: ICD-10-CM

## 2019-08-09 DIAGNOSIS — G44.329 CHRONIC POST-TRAUMATIC HEADACHE, NOT INTRACTABLE: ICD-10-CM

## 2019-08-09 PROCEDURE — 99999 PR PBB SHADOW E&M-EST. PATIENT-LVL III: ICD-10-PCS | Mod: PBBFAC,,, | Performed by: PSYCHIATRY & NEUROLOGY

## 2019-08-09 PROCEDURE — 99214 OFFICE O/P EST MOD 30 MIN: CPT | Mod: S$GLB,,, | Performed by: PSYCHIATRY & NEUROLOGY

## 2019-08-09 PROCEDURE — 99214 PR OFFICE/OUTPT VISIT, EST, LEVL IV, 30-39 MIN: ICD-10-PCS | Mod: S$GLB,,, | Performed by: PSYCHIATRY & NEUROLOGY

## 2019-08-09 PROCEDURE — 97140 MANUAL THERAPY 1/> REGIONS: CPT | Mod: PN | Performed by: PHYSICAL THERAPIST

## 2019-08-09 PROCEDURE — 99999 PR PBB SHADOW E&M-EST. PATIENT-LVL III: CPT | Mod: PBBFAC,,, | Performed by: PSYCHIATRY & NEUROLOGY

## 2019-08-09 RX ORDER — TOPIRAMATE 25 MG/1
25 TABLET ORAL NIGHTLY
Qty: 30 TABLET | Refills: 3 | Status: SHIPPED | OUTPATIENT
Start: 2019-08-09 | End: 2019-10-11 | Stop reason: SDUPTHER

## 2019-08-09 NOTE — PROGRESS NOTES
Physical Therapy Daily Treatment Note     Name: Octavia Arrington  Clinic Number: [id number]    Therapy Diagnosis:    Decreased ROM of neck      Decreased strength      Vestibulopathy, unspecified laterality        Physician: Gerard Cavazos III, MD    Visit Date: 8/9/2019    Physician Orders: PT Eval and Treat, therapeutic exercise passive, active resistive, cervical traction  Medical Diagnosis from Referral:   S06.0X0D (ICD-10-CM) - Concussion without loss of consciousness, subsequent encounter   S13.4XXA (ICD-10-CM) - Whiplash, initial encounter   H81.90 (ICD-10-CM) - Vestibulopathy, unspecified laterality   F07.81 (ICD-10-CM) - Post-concussion syndrome   R51 (ICD-10-CM) - Cervicogenic headache   M54.81 (ICD-10-CM) - Bilateral occipital neuralgia         Evaluation Date: 6/28/2019  Authorization Period Expiration: 05/19/2020  Plan of Care Expiration: 8/23/19  Visit # / Visits authorized: 6/ 12     Time In: 1527  Time Out: 1610  Total Billable Time: 23 minutes  Total treatment time: 43 min   Precautions: Standard      Subjective     Pt reports: headaches occur 2-3 days/week, but are minimal. Headaches can last all day  She was compliant with home exercise program.  Response to previous treatment: no adverse effects reported  Functional change: progressing towards goals- decreased cervical/ shoulder pain    Pain: denies shoulder or neck pain currently  Location: R sided cervical and shoulder region     Objective       Octavia received therapeutic exercises to develop strength, endurance, ROM, flexibility, posture and core stabilization for 20 minutes including:    pec stretch in corner 3 x 30 seconds    Scapular retraction with yellow theraband 2 x 10   Wall Slides 2 x 10  Standing Latissimus dorsi: 2x 10 w/ yellow theraband  Standing mid traps: horizontal abduction w/ yellow theraband  x 10   Standing shoulder ext with yellow TB x 10    Octavia received the following manual therapy techniques: Manual traction, Myofacial  release and Soft tissue Mobilization were applied to the: R upper quadrant for 23 minutes, including:    Supine:   Suboccipital release  Suboccipital rotation  Posterior anterior cervical glides grade 2-3    Prone: soft tissue mobility of R upper traps, levator scap, and rhomboids/ mid traps   Muscle play of R upper trap with release at distal end   Kneading over multiple trigger points: R upper trap, erector spinae ~ c6-7, rhomboids    Sitting R levator scapulae stretch with scapular depression 3 x 10 sec       Home Exercises Provided and Patient Education Provided     Education provided:   - continue with HEP    Written Home Exercises Provided: continue with previous HEP  Exercises were reviewed and Octavia was able to demonstrate them prior to the end of the session.  Octavia demonstrated good  understanding of the education provided.      See EMR under Patient Instructions for exercises provided 6/28/2019.    Assessment     Pt tolerated PT session well today. Pt returned to work this week with no significant increase in symptoms. Pt reports headaches are less frequent and intense, but continue to occur 2-3x/week. Headache is described as a pressure on the top of skull. Pt continues to demonstrate minimal muscle guarding of the R upper quadrent. This is being addressed with manual therapy/ soft tissue manipulation, but PT plans to wean pt off of this as pain continues to subside. Pt will continue to progress towards goals set with continued PT intervention.     Octavia is progressing well towards her goals.   Pt prognosis is Good.     Pt will continue to benefit from skilled outpatient physical therapy to address the deficits listed in the problem list box on initial evaluation, provide pt/family education and to maximize pt's level of independence in the home and community environment.     Pt's spiritual, cultural and educational needs considered and pt agreeable to plan of care and goals.     Anticipated barriers  to physical therapy: trouble following directions, hypersensitivity, significant muscle guarding, significant anticipation to pain    Goals:  Short term goals: 4 weeks, pt agrees to goals set.              1.         Pt will demonstrate understanding and independence with HEP to progress towards functional goals and improve carryover of progress made. ongoing  2.         Pt will report max pain in neck less than 4/10 to demonstrate decreased muscle tone and improved quality of life. ongoing  3.         Pt will demonstrate cervical flexion/extension improvement in active range of motion by 5 degrees each to show decreased muscle tone and pain to improve ability to move head (I.e. During Functional mobility). ongoing  4.         Pt will demonstrate improved R cervical side bending active range of motion to 30 degrees to show decreased muscle tone and decrease neck pain. ongoing  5.         Pt will score MMT 4/5 bilateral Latissimus Dorsi muscle to show improvement in functional scapular strength and decrease shoulder elevation to inhibit muscle tone ongoing  6.         Pt will score MMT 4/5 bilateral Lower Trapezius muscle to show improvement in functional scapular strength to improve posture/ decrease pain and headaches. ongoing  7.         Assess Functional Gait Assessment to determine fall risk as tolerated and set appropriate goals. ongoing  8.         Pt will demonstrate improved cervical AROM CLEMENT to 45 deg to demonstrate improved cervical motor control and improve range of Motion for driving. ongoing     Long term goals: 8 weeks, pt agrees to goals set  9.   Pt will report max pain in neck less than 2/10 to show decreased tone and improved ability to return to activities. ongoing  10. Pt will demonstrate cervical extension improvement in ROM to 55 degrees to demonstrate improved motor control of cervical motions and decrease pain in      neck. ongoing  11. Pt will balance on foam mat with feet together and eyes  closed for at least 30 seconds to be able to ambulate in multi-surfaced community without risk of          falling. ongoing  12. Pt will demonstrate improved convergence point to 10cm to demonstrate improved oculomotor control and improve balance with changing levels (I.e stair negotiation). ongoing  13. Pt will demonstrate improved cervical AROM CLEMENT to 65 deg to demonstrate improved cervical motor control and improve to functional range of Motion needed for driving. ongoing       Plan     Continue cervical stretches, deep neck flexor strengthening, and scapular strengthening, and decrease manual therapy as tolerated      Merari Dougherty DPT  8/9/2019

## 2019-08-15 ENCOUNTER — CLINICAL SUPPORT (OUTPATIENT)
Dept: REHABILITATION | Facility: HOSPITAL | Age: 59
End: 2019-08-15
Payer: OTHER MISCELLANEOUS

## 2019-08-15 DIAGNOSIS — H53.9 DIFFICULTY READING DUE TO VISUAL PROBLEM: ICD-10-CM

## 2019-08-15 DIAGNOSIS — H05.823 EXTRAOCULAR MUSCLE WEAKNESS OF BOTH EYES: ICD-10-CM

## 2019-08-15 DIAGNOSIS — R29.898 DECREASED ROM OF NECK: Primary | ICD-10-CM

## 2019-08-15 DIAGNOSIS — R53.1 DECREASED STRENGTH: ICD-10-CM

## 2019-08-15 PROCEDURE — 97112 NEUROMUSCULAR REEDUCATION: CPT | Mod: PN

## 2019-08-15 PROCEDURE — 97110 THERAPEUTIC EXERCISES: CPT | Mod: PN

## 2019-08-15 PROCEDURE — 97140 MANUAL THERAPY 1/> REGIONS: CPT | Mod: PN

## 2019-08-15 NOTE — PROGRESS NOTES
"  Occupational Therapy Progress Note     Date: 8/15/2019  Name: Octavia Arrington  Clinic Number: [id number]    Therapy Diagnosis:   Encounter Diagnoses   Name Primary?   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  She is being referred to a special opthalmologist  she was  compliant with home exercise program given previously.  Response to previous treatment:  No adverse affects  Functional change: none     Pain: 0/10  Location: neck and head    Objective     Octavia participated in neuromuscular re-education activities to improve: Coordination and strength for 25 minutes. The following activities were included:  Pt performed reading trial in clinic for 1' 30 " prior to difficulty focusing  Saccades in combinations:  Horizontal 65 bpm x 1 min                                                Vertical 65 bpm x 1 min  Focus on word x 1 min at 65 bpm    Organizing [place] cards into like across the table using saccades     Home Exercises and Education Provided     Education provided:   - continue HEP  -reading should stop when additional effort to focus is felt, rest eyes and try to proceed.     Written Home Exercises Provided: Patient instructed to " cont prior HEP.-tracking program and saccades  Exercises were reviewed and Octavia was able to demonstrate them prior to the end of the session.  Octavia demonstrated good  understanding of the HEP provided.   .   See EMR under Patient Instructions for exercises provided prior visits.        Assessment     Pt would continue to benefit from skilled OT. Pt appears to be working hard to recover but continues with various symptoms. She appeared to understand pacing herself with reading task.    Octavia is progressing slowly towards her goals and there are no updates to goals at this time. Pt prognosis is Good.     Pt will continue to benefit from skilled outpatient occupational therapy to address the deficits listed in the problem list on initial evaluation provide pt/family education and to maximize pt's level of independence in the home and community environment.     Anticipated barriers to occupational therapy: none noted    Pt's spiritual, cultural and educational needs considered and pt agreeable to plan of care and goals.    Goals:  Pt will perform her HEP daily    Pt will be able to perform 10 reps of tracking without a rest break- met 7/1/19  Pt will demonstrate convergence at appropriate distance of 6 cm.- not met  Pt will be able to read for 5 minutes without vision changes -  Not met       Plan   Progress patients activities reps as tolerated to achieve goals      ZAFAR Temple

## 2019-08-15 NOTE — PROGRESS NOTES
"  Physical Therapy Daily Treatment Note     Name: Octavia Arrington  Clinic Number: [id number]    Therapy Diagnosis:    Decreased ROM of neck      Decreased strength      Vestibulopathy, unspecified laterality        Physician: Gerard Cavazos III, MD    Visit Date: 8/15/2019    Physician Orders: PT Eval and Treat, therapeutic exercise passive, active resistive, cervical traction  Medical Diagnosis from Referral:   S06.0X0D (ICD-10-CM) - Concussion without loss of consciousness, subsequent encounter   S13.4XXA (ICD-10-CM) - Whiplash, initial encounter   H81.90 (ICD-10-CM) - Vestibulopathy, unspecified laterality   F07.81 (ICD-10-CM) - Post-concussion syndrome   R51 (ICD-10-CM) - Cervicogenic headache   M54.81 (ICD-10-CM) - Bilateral occipital neuralgia         Evaluation Date: 6/28/2019  Authorization Period Expiration: 05/19/2020  Plan of Care Expiration: 8/23/19  Visit # / Visits authorized: 7/ 12     Time In: 1558  Time Out: 1700  Total Billable Time: 58 minutes  Total treatment time: 62 min   Precautions: Standard      Subjective     Pt reports: she has a mild headache today but that she is really tired. Reports she went back to work last week so she is more tired than usual. Pt reports her headaches are getting better but they're still there. Her left side hurts today which is different than normal.    Objective     ROM c/s:    Flexion: 35 degrees  Extension 55 degrees  R rotation 65 degrees *pain L side  L rotation 55 degrees *pain L side  R side bend 30 degrees *radicular sx  L side bend 25 degrees    Positive radial nerve neural tension EDUARDO Dudley received therapeutic exercises to develop strength, endurance, ROM, flexibility, posture and core stabilization for 38 minutes including:    Supine chin tuck 2x10x5"  Standing shoulder extension RTB 2x10  Rows RTB 2x10  pec stretch in corner 3 x 30 seconds    Wall Slides 2 x 10  Standing Latissimus dorsi: 2x 10 w/ yellow theraband NP  Standing mid traps: " "horizontal abduction w/ yellow theraband  x 10   Upper trap stretch 2x30"  Levator scap stretch 2x30"       Octavia received the following manual therapy techniques: Manual traction, Myofacial release and Soft tissue Mobilization were applied to the: R upper quadrant for 20 minutes, including:    Supine:   Suboccipital release  Suboccipital rotation  Posterior anterior cervical glides grade 2-3 R sided only  STM cervical paraspinals    Sitting B levator scap contract-relax and first rib mobilization        Home Exercises Provided and Patient Education Provided     Education provided:   - continue with HEP    Written Home Exercises Provided: continue with previous HEP; add chin tucks  Exercises were reviewed and Octavia was able to demonstrate them prior to the end of the session.  Octavia demonstrated good  understanding of the education provided.      See EMR under Patient Instructions for exercises provided 6/28/2019.    Assessment     Pt tolerated PT session well today. Pt TTP and hypersensitive to touch B cervical paraspinals/suboccipitals-improved with manual therapy techniques. Added upper trap and levator stretch to program, however, patient with L sided pain during c/s movement to the L so stretches performed to the R only. Pt educated in new HEP exercise and the importance of proper posture to improve muscle length and decrease headaches. Pt will continue to progress towards goals set with continued PT intervention.     Octavia is progressing well towards her goals.   Pt prognosis is Good.     Pt will continue to benefit from skilled outpatient physical therapy to address the deficits listed in the problem list box on initial evaluation, provide pt/family education and to maximize pt's level of independence in the home and community environment.     Pt's spiritual, cultural and educational needs considered and pt agreeable to plan of care and goals.     Anticipated barriers to physical therapy: trouble " following directions, hypersensitivity, significant muscle guarding, significant anticipation to pain    Goals:  Short term goals: 4 weeks, pt agrees to goals set.              1.         Pt will demonstrate understanding and independence with HEP to progress towards functional goals and improve carryover of progress made. *GOAL MET 8/15/19  2.         Pt will report max pain in neck less than 4/10 to demonstrate decreased muscle tone and improved quality of life. *in progress  3.         Pt will demonstrate cervical flexion/extension improvement in active range of motion by 5 degrees each to show decreased muscle tone and pain to improve ability to move head (I.e. During Functional mobility). ongoing  4.         Pt will demonstrate improved R cervical side bending active range of motion to 30 degrees to show decreased muscle tone and decrease neck pain. *GOAL MET 8/15/19  5.         Pt will score MMT 4/5 bilateral Latissimus Dorsi muscle to show improvement in functional scapular strength and decrease shoulder elevation to inhibit muscle tone *in progress  6.         Pt will score MMT 4/5 bilateral Lower Trapezius muscle to show improvement in functional scapular strength to improve posture/ decrease pain and headaches. *in progress  7.         Assess Functional Gait Assessment to determine fall risk as tolerated and set appropriate goals. *in progress  8.         Pt will demonstrate improved cervical AROM CLEMENT to 45 deg to demonstrate improved cervical motor control and improve range of Motion for driving. *GOAL MET 8/15/19     Long term goals: 8 weeks, pt agrees to goals set  9.   Pt will report max pain in neck less than 2/10 to show decreased tone and improved ability to return to activities. ongoing  10. Pt will demonstrate cervical extension improvement in ROM to 55 degrees to demonstrate improved motor control of cervical motions and decrease pain in      neck. *GOAL MET 8/15/19  11. Pt will balance on foam  mat with feet together and eyes closed for at least 30 seconds to be able to ambulate in multi-surfaced community without risk of          falling. *in progress  12. Pt will demonstrate improved convergence point to 10cm to demonstrate improved oculomotor control and improve balance with changing levels (I.e stair negotiation).*in progress  13. Pt will demonstrate improved cervical AROM CLEMENT to 65 deg to demonstrate improved cervical motor control and improve to functional range of Motion needed for driving. *in progress       Plan     Continue cervical stretches, deep neck flexor strengthening, and scapular strengthening, and decrease manual therapy as tolerated      Christi Man, PT, DPT  8/15/2019

## 2019-08-18 NOTE — PROGRESS NOTES
Occupational Therapy Daily Treatment Note     Date: 8/19/2019  Name: Octavia Arrington  Clinic Number: [id number]    Therapy Diagnosis:   Encounter Diagnoses   Name Primary?   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 I didn't do much reading this weekend.  she was  compliant with home exercise program given previously.  Response to previous treatment:  Was tired  Functional change: none     Pain: 2-3 /10  Top of head  Location: neck and head    Objective     Octavia participated in neuromuscular re-education activities to improve: Coordination and strength for 30 minutes. The following activities were included:  Near to far x 2 bouts first round 5 second intervals,  second round was 2 second intervals  Saccades in combinations: diagonal up left  70 bpm x 1 min                                               Diagonal up right  bpm x 1 min  Concentration game with 12 pairs to find with eye movements only  Moving cups from table to floor with right hand and return to table with left hand    Organizing [place] cards into like across the table using saccades     Home Exercises and Education Provided     Education provided:   - continue HEP    Written Home Exercises Provided:  Patient instructed to cont prior HEP.-tracking program and saccades  Exercises were reviewed and Octavia was able to demonstrate them prior to the end of the session.  Octavia demonstrated good  understanding of the HEP provided.   .   See EMR under Patient Instructions for exercises provided prior visits.        Assessment     Pt would continue to benefit from skilled OT.   Tiredness noted with  2 minutes of near far. Pt overall tired today due to weekend travelling and full day at work.  No increase in symptoms reported with completion of therapy.     Octavia is progressing slowly towards her goals and there are no updates to goals at this time. Pt prognosis is Good.     Pt will continue to benefit from skilled outpatient occupational therapy to address the deficits listed in the problem list on initial evaluation provide pt/family education and to maximize pt's level of independence in the home and community environment.     Anticipated barriers to occupational therapy: none noted    Pt's spiritual, cultural and educational needs considered and pt agreeable to plan of care and goals.    Goals:  Pt will perform her HEP daily    Pt will be able to perform 10 reps of tracking without a rest break- met 7/1/19  Pt will demonstrate convergence at appropriate distance of 6 cm.- not met  Pt will be able to read for 5 minutes without vision changes -  Not met       Plan   Progress patients activities reps as tolerated to achieve goals      ZAFAR Temple

## 2019-08-19 ENCOUNTER — CLINICAL SUPPORT (OUTPATIENT)
Dept: REHABILITATION | Facility: HOSPITAL | Age: 59
End: 2019-08-19
Payer: OTHER MISCELLANEOUS

## 2019-08-19 DIAGNOSIS — H53.9 DIFFICULTY READING DUE TO VISUAL PROBLEM: ICD-10-CM

## 2019-08-19 DIAGNOSIS — H05.823 EXTRAOCULAR MUSCLE WEAKNESS OF BOTH EYES: ICD-10-CM

## 2019-08-19 PROCEDURE — 97112 NEUROMUSCULAR REEDUCATION: CPT | Mod: PN

## 2019-08-20 ENCOUNTER — CLINICAL SUPPORT (OUTPATIENT)
Dept: REHABILITATION | Facility: HOSPITAL | Age: 59
End: 2019-08-20
Payer: OTHER MISCELLANEOUS

## 2019-08-20 DIAGNOSIS — R29.898 DECREASED ROM OF NECK: ICD-10-CM

## 2019-08-20 DIAGNOSIS — H81.90 VESTIBULOPATHY, UNSPECIFIED LATERALITY: ICD-10-CM

## 2019-08-20 DIAGNOSIS — R53.1 DECREASED STRENGTH: ICD-10-CM

## 2019-08-20 PROCEDURE — 97110 THERAPEUTIC EXERCISES: CPT | Mod: PN

## 2019-08-20 NOTE — PROGRESS NOTES
"  Physical Therapy Daily Treatment Note     Name: Octavia Arrington  Clinic Number: [id number]    Therapy Diagnosis:    Decreased ROM of neck      Decreased strength      Vestibulopathy, unspecified laterality        Physician: Gerard Cavazos III, MD    Visit Date: 8/20/2019    Physician Orders: PT Eval and Treat, therapeutic exercise passive, active resistive, cervical traction  Medical Diagnosis from Referral:   S06.0X0D (ICD-10-CM) - Concussion without loss of consciousness, subsequent encounter   S13.4XXA (ICD-10-CM) - Whiplash, initial encounter   H81.90 (ICD-10-CM) - Vestibulopathy, unspecified laterality   F07.81 (ICD-10-CM) - Post-concussion syndrome   R51 (ICD-10-CM) - Cervicogenic headache   M54.81 (ICD-10-CM) - Bilateral occipital neuralgia         Evaluation Date: 6/28/2019  Authorization Period Expiration: 05/19/2020  Plan of Care Expiration: 8/23/19  Visit # / Visits authorized: 8/ 12     Time In: 4:05  Time Out: 5:10pm  Total Billable Time: 45 minutes  Total treatment time: 55 min   Precautions: Standard      Subjective     Pt reports: Feels tired today since she had work today. States minimal cervical pain B. Continued report of headaches    Objective       Octavia received therapeutic exercises to develop strength, endurance, ROM, flexibility, posture and core stabilization for 40 minutes including:    Supine chin tuck 2x15 x5"  Standing shoulder extension RTB 2x10  Rows RTB 2x15    pec stretch in corner 3 x 30 seconds    Wall Slides 2 x 10  Standing Latissimus dorsi: 2x 10 w/ yellow theraband  Standing mid traps: horizontal abduction w/ yellow theraband 2 x 10   +wall push ups 2 x 10  Upper trap stretch 2x30"  Levator scap stretch 2x30"       Octavia received the following manual therapy techniques: Manual traction, Myofacial release and Soft tissue Mobilization were applied to the: R upper quadrant for 15 minutes, including:    Supine:   Suboccipital release  Suboccipital rotation  Posterior anterior " cervical glides grade 2-3 R sided only  STM cervical paraspinals    Sitting B levator scap contract-relax and first rib mobilization-NP        Home Exercises Provided and Patient Education Provided     Education provided:   - continue with HEP    Written Home Exercises Provided: continue with previous HEP; add chin tucks  Exercises were reviewed and Octavia was able to demonstrate them prior to the end of the session.  Octavia demonstrated good  understanding of the education provided.      See EMR under Patient Instructions for exercises provided 6/28/2019.    Assessment     Pt tolerated PT session well today. No tenderness to palpation of paraspinals today with manual therapy techniques. Pt tolerated addition of new exercise without any adverse effect. Pt will continue to progress towards goals set with continued PT intervention.     Octavia is progressing well towards her goals.   Pt prognosis is Good.     Pt will continue to benefit from skilled outpatient physical therapy to address the deficits listed in the problem list box on initial evaluation, provide pt/family education and to maximize pt's level of independence in the home and community environment.     Pt's spiritual, cultural and educational needs considered and pt agreeable to plan of care and goals.     Anticipated barriers to physical therapy: trouble following directions, hypersensitivity, significant muscle guarding, significant anticipation to pain    Goals:  Short term goals: 4 weeks, pt agrees to goals set.              1.         Pt will demonstrate understanding and independence with HEP to progress towards functional goals and improve carryover of progress made. *GOAL MET 8/15/19  2.         Pt will report max pain in neck less than 4/10 to demonstrate decreased muscle tone and improved quality of life. *in progress  3.         Pt will demonstrate cervical flexion/extension improvement in active range of motion by 5 degrees each to show  decreased muscle tone and pain to improve ability to move head (I.e. During Functional mobility). ongoing  4.         Pt will demonstrate improved R cervical side bending active range of motion to 30 degrees to show decreased muscle tone and decrease neck pain. *GOAL MET 8/15/19  5.         Pt will score MMT 4/5 bilateral Latissimus Dorsi muscle to show improvement in functional scapular strength and decrease shoulder elevation to inhibit muscle tone *in progress  6.         Pt will score MMT 4/5 bilateral Lower Trapezius muscle to show improvement in functional scapular strength to improve posture/ decrease pain and headaches. *in progress  7.         Assess Functional Gait Assessment to determine fall risk as tolerated and set appropriate goals. *in progress  8.         Pt will demonstrate improved cervical AROM CLEMENT to 45 deg to demonstrate improved cervical motor control and improve range of Motion for driving. *GOAL MET 8/15/19     Long term goals: 8 weeks, pt agrees to goals set  9.   Pt will report max pain in neck less than 2/10 to show decreased tone and improved ability to return to activities. ongoing  10. Pt will demonstrate cervical extension improvement in ROM to 55 degrees to demonstrate improved motor control of cervical motions and decrease pain in      neck. *GOAL MET 8/15/19  11. Pt will balance on foam mat with feet together and eyes closed for at least 30 seconds to be able to ambulate in multi-surfaced community without risk of          falling. *in progress  12. Pt will demonstrate improved convergence point to 10cm to demonstrate improved oculomotor control and improve balance with changing levels (I.e stair negotiation).*in progress  13. Pt will demonstrate improved cervical AROM CLEMENT to 65 deg to demonstrate improved cervical motor control and improve to functional range of Motion needed for driving. *in progress       Plan     Continue cervical stretches, deep neck flexor strengthening, and  scapular strengthening, and decrease manual therapy as tolerated      Lani Munoz, PT, DPT  8/20/2019

## 2019-08-22 ENCOUNTER — CLINICAL SUPPORT (OUTPATIENT)
Dept: REHABILITATION | Facility: HOSPITAL | Age: 59
End: 2019-08-22
Payer: OTHER MISCELLANEOUS

## 2019-08-22 DIAGNOSIS — H81.90 VESTIBULOPATHY, UNSPECIFIED LATERALITY: ICD-10-CM

## 2019-08-22 DIAGNOSIS — R29.898 DECREASED ROM OF NECK: ICD-10-CM

## 2019-08-22 DIAGNOSIS — R53.1 DECREASED STRENGTH: ICD-10-CM

## 2019-08-22 PROCEDURE — 97110 THERAPEUTIC EXERCISES: CPT | Mod: PN

## 2019-08-22 NOTE — PROGRESS NOTES
"  Physical Therapy Daily Treatment Note     Name: Octavia Arrington  Clinic Number: [id number]    Therapy Diagnosis:    Decreased ROM of neck      Decreased strength      Vestibulopathy, unspecified laterality        Physician: Gerard Cavazos III, MD    Visit Date: 2019    Physician Orders: PT Eval and Treat, therapeutic exercise passive, active resistive, cervical traction  Medical Diagnosis from Referral:   S06.0X0D (ICD-10-CM) - Concussion without loss of consciousness, subsequent encounter   S13.4XXA (ICD-10-CM) - Whiplash, initial encounter   H81.90 (ICD-10-CM) - Vestibulopathy, unspecified laterality   F07.81 (ICD-10-CM) - Post-concussion syndrome   R51 (ICD-10-CM) - Cervicogenic headache   M54.81 (ICD-10-CM) - Bilateral occipital neuralgia         Evaluation Date: 2019  Authorization Period Expiration: 2020  Plan of Care Expiration: 19-19  Visit # / Visits authorized: 10/ 12     Time In: 4:00pm  Time Out: 5:10pm  Total Billable Time: 30 minutes  Total treatment time: 70 min   Precautions: Standard      Subjective     Pt reports: Feels tired today since she had work today. Reports she feel ascending stairs the other day, unsure if she was unsteady due to vision, concussion symptoms, or just stepped incorrectly    Objective       Octavia received therapeutic exercises to develop strength, endurance, ROM, flexibility, posture and core stabilization for 40 minutes includin/15/19  ROM c/s:   Flexion: 35 degrees  Extension 55 degrees  R rotation 65 degrees *pain L side  L rotation 55 degrees *pain L side  R side bend 30 degrees *radicular sx  L side bend 25 degrees   Positive radial nerve neural tension RUE    Supine chin tuck 2x15 x5"  Standing shoulder extension RTB 2x10  Rows RTB 2x15    pec stretch in corner 3 x 30 seconds    Wall Slides 2 x 10  Standing Latissimus dorsi: 2x 10 w/ yellow theraband  Standing mid traps: horizontal abduction w/ yellow theraband 2 x 10   +wall push ups 2 x " "10  Upper trap stretch 2x30"  Levator scap stretch 2x30"     Standing balance NBOS 2 x 30"   Standing balance NBOS eyes closed 2 x 30'-min sway  Tandem 2 x 30 seconds each- min sway    Octavia received the following manual therapy techniques: Manual traction, Myofacial release and Soft tissue Mobilization were applied to the: R upper quadrant for 15 minutes, including:    Supine:   Suboccipital release  Suboccipital rotation  Posterior anterior cervical glides grade 2-3 R sided only  STM cervical paraspinals    Sitting B levator scap contract-relax and first rib mobilization-NP        Home Exercises Provided and Patient Education Provided     Education provided:   - continue with HEP    Written Home Exercises Provided: continue with previous HEP; add chin tucks  Exercises were reviewed and Octavia was able to demonstrate them prior to the end of the session.  Octavia demonstrated good  understanding of the education provided.      See EMR under Patient Instructions for exercises provided 6/28/2019.    Assessment     Pt tolerated PT session well today. Minimal tenderness to palpation in paraspinals today prior to manual therapy. Pt remains limited in cervical range of motion, reporting pain on L side with rotation bilaterally. Right side bend producing radicular symptoms and a positive radial nerve neural tension test when assessed 8/15/19. Pt continues to report tension headaches and dizziness when transitioning supine to sit or when attempting to  an item from the floor on occasion throughout the week. Due to pt requirements at work including: picking up items from floor and standing for prolonged periods of time, plan to extend plan of care 2x/week for another 4 weeks to address continued deficits. PT will assess FGA at next visit to identify fall risk. Pt will continue to progress towards goals set with continued PT intervention.     Octavia is progressing well towards her goals.   Pt prognosis is Good.     Pt " will continue to benefit from skilled outpatient physical therapy to address the deficits listed in the problem list box on initial evaluation, provide pt/family education and to maximize pt's level of independence in the home and community environment.     Pt's spiritual, cultural and educational needs considered and pt agreeable to plan of care and goals.     Anticipated barriers to physical therapy: trouble following directions, hypersensitivity, significant muscle guarding, significant anticipation to pain    Goals:  Short term goals: 4 weeks, pt agrees to goals set.              1.         Pt will demonstrate understanding and independence with HEP to progress towards functional goals and improve carryover of progress made. *GOAL MET 8/15/19  2.         Pt will report max pain in neck less than 4/10 to demonstrate decreased muscle tone and improved quality of life. *in progress  3.         Pt will demonstrate cervical flexion/extension improvement in active range of motion by 5 degrees each to show decreased muscle tone and pain to improve ability to move head (I.e. During Functional mobility). ongoing  4.         Pt will demonstrate improved R cervical side bending active range of motion to 30 degrees to show decreased muscle tone and decrease neck pain. *GOAL MET 8/15/19  5.         Pt will score MMT 4/5 bilateral Latissimus Dorsi muscle to show improvement in functional scapular strength and decrease shoulder elevation to inhibit muscle tone *in progress  6.         Pt will score MMT 4/5 bilateral Lower Trapezius muscle to show improvement in functional scapular strength to improve posture/ decrease pain and headaches. *in progress  7.         Assess Functional Gait Assessment to determine fall risk as tolerated and set appropriate goals. *in progress  8.         Pt will demonstrate improved cervical AROM CLEMENT to 45 deg to demonstrate improved cervical motor control and improve range of Motion for driving.  *GOAL MET 8/15/19     Long term goals: 8 weeks, pt agrees to goals set  9.   Pt will report max pain in neck less than 2/10 to show decreased tone and improved ability to return to activities. ongoing  10. Pt will demonstrate cervical extension improvement in ROM to 55 degrees to demonstrate improved motor control of cervical motions and decrease pain in      neck. *GOAL MET 8/15/19  11. Pt will balance on foam mat with feet together and eyes closed for at least 30 seconds to be able to ambulate in multi-surfaced community without risk of          falling. *in progress  12. Pt will demonstrate improved convergence point to 10cm to demonstrate improved oculomotor control and improve balance with changing levels (I.e stair negotiation).*in progress  13. Pt will demonstrate improved cervical AROM CLEMENT to 65 deg to demonstrate improved cervical motor control and improve to functional range of Motion needed for driving. *in progress       Plan     Continue cervical stretches, deep neck flexor strengthening, and scapular strengthening, and decrease manual therapy as tolerated      Lani Munoz, PT, DPT  8/22/2019

## 2019-08-26 ENCOUNTER — DOCUMENTATION ONLY (OUTPATIENT)
Dept: REHABILITATION | Facility: HOSPITAL | Age: 59
End: 2019-08-26

## 2019-08-26 DIAGNOSIS — H53.9 DIFFICULTY READING DUE TO VISUAL PROBLEM: ICD-10-CM

## 2019-08-26 DIAGNOSIS — H05.823 EXTRAOCULAR MUSCLE WEAKNESS OF BOTH EYES: ICD-10-CM

## 2019-08-27 ENCOUNTER — CLINICAL SUPPORT (OUTPATIENT)
Dept: REHABILITATION | Facility: HOSPITAL | Age: 59
End: 2019-08-27
Payer: OTHER MISCELLANEOUS

## 2019-08-27 DIAGNOSIS — R53.1 DECREASED STRENGTH: ICD-10-CM

## 2019-08-27 DIAGNOSIS — H81.90 VESTIBULOPATHY, UNSPECIFIED LATERALITY: ICD-10-CM

## 2019-08-27 DIAGNOSIS — R29.898 DECREASED ROM OF NECK: ICD-10-CM

## 2019-08-27 PROCEDURE — 97110 THERAPEUTIC EXERCISES: CPT | Mod: PN

## 2019-08-27 NOTE — PROGRESS NOTES
"  Physical Therapy Daily Treatment Note     Name: Octavia Arrington  Clinic Number: [id number]    Therapy Diagnosis:    Decreased ROM of neck      Decreased strength      Vestibulopathy, unspecified laterality        Physician: Gerard Cavazos III, MD    Visit Date: 8/27/2019    Physician Orders: PT Eval and Treat, therapeutic exercise passive, active resistive, cervical traction  Medical Diagnosis from Referral:   S06.0X0D (ICD-10-CM) - Concussion without loss of consciousness, subsequent encounter   S13.4XXA (ICD-10-CM) - Whiplash, initial encounter   H81.90 (ICD-10-CM) - Vestibulopathy, unspecified laterality   F07.81 (ICD-10-CM) - Post-concussion syndrome   R51 (ICD-10-CM) - Cervicogenic headache   M54.81 (ICD-10-CM) - Bilateral occipital neuralgia         Evaluation Date: 6/28/2019  Authorization Period Expiration: 05/19/2020  Plan of Care Expiration: 8/23/19-9/23/19  Visit # / Visits authorized: 10/ 12     Time In: 4:15pm  Time Out: 5:10pm  Total Billable Time: 45 minutes  Total treatment time: 45 min   Precautions: Standard      Subjective     Pt reports: Feels pretty good today, no c/o cervical pain.     Objective       Octavia received therapeutic exercises to develop strength, endurance, ROM, flexibility, posture and core stabilization for 45 minutes including:    +seated chin tuck 2x10 x3"  +Cervical walk outs 10 x 5" with yellow TB  Standing shoulder extension RTB 2x10  Rows RTB 2x15     pec stretch in corner 3 x 30 seconds    Wall Slides 2 x 10-NP  Standing Latissimus dorsi: 2x 10 w/ yellow theraband  Standing mid traps: horizontal abduction w/ yellow theraband 2 x 10   +wall push ups 2 x 10  Upper trap stretch 2x30"  Levator scap stretch 2x30"     Standing balance NBOS 2 x 30"   Standing balance NBOS eyes closed 2 x 30'-min sway  Tandem 2 x 30 seconds each- min sway    Octavia received the following manual therapy techniques: Manual traction, Myofacial release and Soft tissue Mobilization were applied to the: " R upper quadrant for 0 minutes, including:    Supine:   Suboccipital release  Suboccipital rotation  Posterior anterior cervical glides grade 2-3 R sided only  STM cervical paraspinals    Sitting B levator scap contract-relax and first rib mobilization-NP        Home Exercises Provided and Patient Education Provided     Education provided:   - continue with HEP    Written Home Exercises Provided: continue with previous HEP; add chin tucks  Exercises were reviewed and Octavia was able to demonstrate them prior to the end of the session.  Octavia demonstrated good  understanding of the education provided.      See EMR under Patient Instructions for exercises provided 6/28/2019.    Assessment     Pt tolerated PT session well today. Pt continues to report tension headaches and dizziness when transitioning supine to sit or when attempting to  an item from the floor on occasion throughout the week. No compliant of increase in pain with additional exercises today. Appropriate soreness and fatigue. PT will assess FGA at next visit to identify fall risk. Pt will continue to progress towards goals set with continued PT intervention.     Octavia is progressing well towards her goals.   Pt prognosis is Good.     Pt will continue to benefit from skilled outpatient physical therapy to address the deficits listed in the problem list box on initial evaluation, provide pt/family education and to maximize pt's level of independence in the home and community environment.     Pt's spiritual, cultural and educational needs considered and pt agreeable to plan of care and goals.     Anticipated barriers to physical therapy: trouble following directions, hypersensitivity, significant muscle guarding, significant anticipation to pain    Goals:  Short term goals: 4 weeks, pt agrees to goals set.              1.         Pt will demonstrate understanding and independence with HEP to progress towards functional goals and improve carryover  of progress made. *GOAL MET 8/15/19  2.         Pt will report max pain in neck less than 4/10 to demonstrate decreased muscle tone and improved quality of life. *in progress  3.         Pt will demonstrate cervical flexion/extension improvement in active range of motion by 5 degrees each to show decreased muscle tone and pain to improve ability to move head (I.e. During Functional mobility). ongoing  4.         Pt will demonstrate improved R cervical side bending active range of motion to 30 degrees to show decreased muscle tone and decrease neck pain. *GOAL MET 8/15/19  5.         Pt will score MMT 4/5 bilateral Latissimus Dorsi muscle to show improvement in functional scapular strength and decrease shoulder elevation to inhibit muscle tone *in progress  6.         Pt will score MMT 4/5 bilateral Lower Trapezius muscle to show improvement in functional scapular strength to improve posture/ decrease pain and headaches. *in progress  7.         Assess Functional Gait Assessment to determine fall risk as tolerated and set appropriate goals. *in progress  8.         Pt will demonstrate improved cervical AROM CLEMENT to 45 deg to demonstrate improved cervical motor control and improve range of Motion for driving. *GOAL MET 8/15/19     Long term goals: 8 weeks, pt agrees to goals set  9.   Pt will report max pain in neck less than 2/10 to show decreased tone and improved ability to return to activities. ongoing  10. Pt will demonstrate cervical extension improvement in ROM to 55 degrees to demonstrate improved motor control of cervical motions and decrease pain in      neck. *GOAL MET 8/15/19  11. Pt will balance on foam mat with feet together and eyes closed for at least 30 seconds to be able to ambulate in multi-surfaced community without risk of          falling. *in progress  12. Pt will demonstrate improved convergence point to 10cm to demonstrate improved oculomotor control and improve balance with changing levels  (I.e stair negotiation).*in progress  13. Pt will demonstrate improved cervical AROM CLEMENT to 65 deg to demonstrate improved cervical motor control and improve to functional range of Motion needed for driving. *in progress       Plan     Continue cervical stretches, deep neck flexor strengthening, and scapular strengthening, and decrease manual therapy as tolerated      Lani Munoz, PT, DPT  8/27/2019

## 2019-08-28 NOTE — PROGRESS NOTES
"  Occupational Therapy Daily Progress Note/  D/C Summary     Date: 8/29/2019  Name: Octavia Arrington  Clinic Number: [id number]    Therapy Diagnosis:   Encounter Diagnoses   Name Primary?   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 I still get some blurry vision intermittently.  I seem to get tired easily.  she was  compliant with home exercise program given previously.  Response to previous treatment:  Was tired  Functional change: none     Pain: 1-2 /10  Top of head  Location:  head    Objective     Octavia participated in neuromuscular re-education activities to improve: Coordination and strength for 38 minutes. The following activities were included:  Convergence test 12" away  Reading test- 1' 18"     Saccades in combinations: placing cards in numeral stacks 5 to her right 8 to her left                                               Merge cards picking one card from each side until complete                                               Making color pattern with pegs in peg board reaching left and right with eyes only 4.5 rows of 10 pegs                                  "               Removed pegs reaching in opposite positions.    Organizing [place] cards into like across the table using saccades     Home Exercises and Education Provided     Education provided:   - continue HEP    Written Home Exercises Provided: Patient instructed to cont prior HEP.-tracking program and saccades  Exercises were reviewed and Octavia was able to demonstrate them prior to the end of the session.  Octavia demonstrated good  understanding of the HEP provided.   .   See EMR under Patient Instructions for exercises provided prior visits.        Assessment     Pt has made progress with respect to her headaches but continues with difficulties with convergence. She is able to read for less then 2 minutes before she has vision changes.   Anticipated barriers to occupational therapy: none noted    Pt's spiritual, cultural and educational needs considered and pt agreeable to plan of care and goals.    Goals:  Pt will perform her HEP daily    Pt will be able to perform 10 reps of tracking without a rest break- met 7/1/19  Pt will demonstrate convergence at appropriate distance of 6 cm.- not met  Pt will be able to read for 5 minutes without vision changes -  Not me    Discharge reason: Patient has completed allowable visits authorized by insurance    Plan   This patient is discharged from Occupational Therapy.  Pt to consult eye specialist in October.    ZAFAR Temple

## 2019-08-29 ENCOUNTER — CLINICAL SUPPORT (OUTPATIENT)
Dept: REHABILITATION | Facility: HOSPITAL | Age: 59
End: 2019-08-29
Payer: OTHER MISCELLANEOUS

## 2019-08-29 DIAGNOSIS — H05.823 EXTRAOCULAR MUSCLE WEAKNESS OF BOTH EYES: ICD-10-CM

## 2019-08-29 DIAGNOSIS — H53.9 DIFFICULTY READING DUE TO VISUAL PROBLEM: ICD-10-CM

## 2019-08-29 DIAGNOSIS — R29.898 DECREASED ROM OF NECK: ICD-10-CM

## 2019-08-29 DIAGNOSIS — H81.90 VESTIBULOPATHY, UNSPECIFIED LATERALITY: ICD-10-CM

## 2019-08-29 DIAGNOSIS — R53.1 DECREASED STRENGTH: ICD-10-CM

## 2019-08-29 PROCEDURE — 97110 THERAPEUTIC EXERCISES: CPT | Mod: PN

## 2019-08-29 PROCEDURE — 97112 NEUROMUSCULAR REEDUCATION: CPT | Mod: PN

## 2019-08-29 NOTE — PROGRESS NOTES
Physical Therapy Daily Treatment Note     Name: Octavia Arrington  Clinic Number: [id number]    Therapy Diagnosis:    Decreased ROM of neck      Decreased strength      Vestibulopathy, unspecified laterality        Physician: Gerard Cavazos III, MD    Visit Date: 8/29/2019    Physician Orders: PT Eval and Treat, therapeutic exercise passive, active resistive, cervical traction  Medical Diagnosis from Referral:   S06.0X0D (ICD-10-CM) - Concussion without loss of consciousness, subsequent encounter   S13.4XXA (ICD-10-CM) - Whiplash, initial encounter   H81.90 (ICD-10-CM) - Vestibulopathy, unspecified laterality   F07.81 (ICD-10-CM) - Post-concussion syndrome   R51 (ICD-10-CM) - Cervicogenic headache   M54.81 (ICD-10-CM) - Bilateral occipital neuralgia         Evaluation Date: 6/28/2019  Authorization Period Expiration: 05/19/2020  Plan of Care Expiration: 8/23/19-9/23/19  Visit # / Visits authorized: 11/ 12     Time In: 3:08pm  Time Out: 4:05pm  Total Billable Time: 30 minutes    Precautions: Standard      Subjective     Pt reports: Feels pretty good today, no c/o cervical pain.     Objective       Octavia received therapeutic exercises to develop strength, endurance, ROM, flexibility, posture and core stabilization for 47 minutes including:    Functional Gait Assessment:   1. Gait on level surface =  3   (3) Normal: less than 5.5 sec, no A.D., no imbalance, normal gait pattern, deviates< 6in   (2) Mild impairment: 7-5.6 sec, uses A.D., mild gait deviations, or deviates 6-10 in   (1) Moderate impairment: > 7 sec, slow speed, imbalance, deviates 10-15 in.   (0) Severe impairment: needs assist, deviates >15 in, reach/touch wall  2. Change in Gait Speed = 3   (3) Normal: smooth change w/o loss of balance or gait deviation, deviates < 6 in, significant difference between speeds   (2) Mild impairment: changes speed, but demonstrates mild gait deviations, deviates 6-10 in, OR no deviations but unable to significantly speed, OR  uses A.D.   (1) Moderate impairment: minor changes to speed, OR changes speed w/ significant deviations, deviates 10-15 in, OR  Changes speed , but loses balance & recovers   (0) Severe impairment: cannot change speed, deviates >15 in, or loses balance & needs assist  3. Gait with horizontal head turns  = 3   (3) Normal: no change in gait, deviates <6 in   (2) Mild impairment: slight change in speed, deviates 6-10 in, OR uses A.D.   (1) Moderate impairment: moderate change in speed, deviates 10-15 in   (0) Severe impairment: severe disruption of gait, deviates >15in  4. Gait with vertical head turns = 3   (3) Normal: no change in gait, deviates <6 in   (2) Mild impairment: slight change in speed, deviates 6-10 in OR uses A.D.   (1) Moderate impairment: moderate change in speed, deviates 10-15 in   (0) Severe impairment: severe disruption of gait, deviates >15 in  5. Gait with pivot turns = 3   (3) Normal: performs safely in 3 sec, no LOB   (2) Mild impairment: performs in >3 sec & no LOB, OR turns safely & requires several steps to regain LOB   (1) Moderate impairment: turns slow, OR requires several small steps for balance following turn & stop   (0) Severe impairment: cannot turn safely, needs assist  6. Step over obstacle = 3   (3) Normal: steps over 2 stacked boxes w/o change in speed or LOB   (2) Mild impairment: able to step over 1 box w/o change in speed or LOB   (1) Moderate impairment: steps over 1 box but must slow down, may require VC   (0) Severe impairment: cannot perform w/o assist  7. Gait with Narrow MARIELA = 3   (3) Normal: 10 steps no staggering   (2) Mild impairment: 7-9 steps   (1) Moderate impairment: 4-7 steps   (0) Severe impairment: < 4 steps or cannot perform w/o assist  8. Gait with eyes closed = 2   (3) Normal: < 7 sec, no A.D., no LOB, normal gait pattern, deviates <6 in   (2) Mild impairment: 7.1-9 sec, mild gait deviations, deviates 6-10 in   (1) Moderate impairment: > 9 sec, abnormal  "pattern, LOB, deviates 10-15 in   (0) Severe impairment: cannot perform w/o assist, LOB, deviates >15in  9. Ambulating Backwards = 2   (3) Normal: no A.D., no LOB, normal gait pattern, deviates <6in   (2) Mild impairment: uses A.D., slower speed, mild gait deviations, deviates 6-10 in   (1) Moderate impairment: slow speed, abnormal gait pattern, LOB, deviates 10-15 in   (0) Severe impairment: severe gait deviations or LOB, deviates >15in  10. Steps = 2   (3) Normal: alternating feet, no rail   (2) Mild Impairment: alternating feet, uses rail   (1) Moderate impairment: step-to, uses rail   (0) Severe impairment: cannot perform safely    Score 27/30     Score: 27  <22/30 fall risk   <20/30 fall risk in older adults   <18/30 fall risk in Parkinsons       +seated chin tuck 2x10 x3"  +Cervical walk outs 10 x 5" with yellow TB  Standing shoulder extension RTB 2x10  Rows RTB 2x15     pec stretch in corner 3 x 30 seconds    Wall Slides 2 x 10-NP  Standing Latissimus dorsi: 2x 10 w/ yellow theraband  Standing mid traps: horizontal abduction w/ yellow theraband 2 x 10   +wall push ups 2 x 10  Upper trap stretch 2x30"  Levator scap stretch 2x30"     Standing balance NBOS 2 x 30"   Standing balance NBOS eyes closed 2 x 30'-min sway  Tandem 2 x 30 seconds each- min sway    Octavia received the following manual therapy techniques: Manual traction, Myofacial release and Soft tissue Mobilization were applied to the: R upper quadrant for 0 minutes, including:    Supine:   Suboccipital release  Suboccipital rotation  Posterior anterior cervical glides grade 2-3 R sided only  STM cervical paraspinals    Sitting B levator scap contract-relax and first rib mobilization-NP        Home Exercises Provided and Patient Education Provided     Education provided:   - continue with HEP    Written Home Exercises Provided: continue with previous HEP; add chin tucks  Exercises were reviewed and Octavia was able to demonstrate them prior to the end " of the session.  Octavia demonstrated good  understanding of the education provided.      See EMR under Patient Instructions for exercises provided 6/28/2019.    Assessment     Pt tolerated PT session well today. Pt continues to report tension headaches and dizziness when transitioning supine to sit or when attempting to  an item from the floor on occasion throughout the week. No compliant of increase in pain with additional exercises today. Appropriate soreness and fatigue. PT assessed pt using FGA today. Pt scored 27/30 indicating pt is not a fall risk. Pt will continue to progress towards goals set with continued PT intervention.     Octavia is progressing well towards her goals.   Pt prognosis is Good.     Pt will continue to benefit from skilled outpatient physical therapy to address the deficits listed in the problem list box on initial evaluation, provide pt/family education and to maximize pt's level of independence in the home and community environment.     Pt's spiritual, cultural and educational needs considered and pt agreeable to plan of care and goals.     Anticipated barriers to physical therapy: trouble following directions, hypersensitivity, significant muscle guarding, significant anticipation to pain    Goals:  Short term goals: 4 weeks, pt agrees to goals set.              1.         Pt will demonstrate understanding and independence with HEP to progress towards functional goals and improve carryover of progress made. *GOAL MET 8/15/19  2.         Pt will report max pain in neck less than 4/10 to demonstrate decreased muscle tone and improved quality of life. *in progress  3.         Pt will demonstrate cervical flexion/extension improvement in active range of motion by 5 degrees each to show decreased muscle tone and pain to improve ability to move head (I.e. During Functional mobility). ongoing  4.         Pt will demonstrate improved R cervical side bending active range of motion to 30  degrees to show decreased muscle tone and decrease neck pain. *GOAL MET 8/15/19  5.         Pt will score MMT 4/5 bilateral Latissimus Dorsi muscle to show improvement in functional scapular strength and decrease shoulder elevation to inhibit muscle tone *in progress  6.         Pt will score MMT 4/5 bilateral Lower Trapezius muscle to show improvement in functional scapular strength to improve posture/ decrease pain and headaches. *in progress  7.         Assess Functional Gait Assessment to determine fall risk as tolerated and set appropriate goals. Scored 27/30, Not a fall risk  8.         Pt will demonstrate improved cervical AROM CLEMENT to 45 deg to demonstrate improved cervical motor control and improve range of Motion for driving. *GOAL MET 8/15/19     Long term goals: 8 weeks, pt agrees to goals set  9.   Pt will report max pain in neck less than 2/10 to show decreased tone and improved ability to return to activities. ongoing  10. Pt will demonstrate cervical extension improvement in ROM to 55 degrees to demonstrate improved motor control of cervical motions and decrease pain in      neck. *GOAL MET 8/15/19  11. Pt will balance on foam mat with feet together and eyes closed for at least 30 seconds to be able to ambulate in multi-surfaced community without risk of          falling. *in progress  12. Pt will demonstrate improved convergence point to 10cm to demonstrate improved oculomotor control and improve balance with changing levels (I.e stair negotiation).*in progress  13. Pt will demonstrate improved cervical AROM CLEMENT to 65 deg to demonstrate improved cervical motor control and improve to functional range of Motion needed for driving. *in progress       Plan     Continue cervical stretches, deep neck flexor strengthening, and scapular strengthening, and decrease manual therapy as tolerated      Lani Munoz, PT, DPT  8/29/2019

## 2019-10-07 ENCOUNTER — OFFICE VISIT (OUTPATIENT)
Dept: OPTOMETRY | Facility: CLINIC | Age: 59
End: 2019-10-07
Payer: OTHER MISCELLANEOUS

## 2019-10-07 DIAGNOSIS — H53.2 BINOCULAR VISION DISORDER WITH DIPLOPIA: ICD-10-CM

## 2019-10-07 DIAGNOSIS — H20.9 ANTERIOR UVEITIS: ICD-10-CM

## 2019-10-07 DIAGNOSIS — H50.21 HYPERTROPIA OF RIGHT EYE: ICD-10-CM

## 2019-10-07 DIAGNOSIS — H50.30 ESOTROPIA, INTERMITTENT: Primary | ICD-10-CM

## 2019-10-07 PROCEDURE — 99244 OFF/OP CNSLTJ NEW/EST MOD 40: CPT | Mod: S$GLB,,, | Performed by: OPTOMETRIST

## 2019-10-07 PROCEDURE — 99244 PR OFFICE CONSULTATION,LEVEL IV: ICD-10-PCS | Mod: S$GLB,,, | Performed by: OPTOMETRIST

## 2019-10-07 PROCEDURE — 92015 PR REFRACTION: ICD-10-PCS | Mod: S$GLB,,, | Performed by: OPTOMETRIST

## 2019-10-07 PROCEDURE — 99999 PR PBB SHADOW E&M-EST. PATIENT-LVL II: CPT | Mod: PBBFAC,,, | Performed by: OPTOMETRIST

## 2019-10-07 PROCEDURE — 92015 DETERMINE REFRACTIVE STATE: CPT | Mod: S$GLB,,, | Performed by: OPTOMETRIST

## 2019-10-07 PROCEDURE — 92060 PR SPECIAL EYE EVAL,SENSORIMOTOR: ICD-10-PCS | Mod: S$GLB,,, | Performed by: OPTOMETRIST

## 2019-10-07 PROCEDURE — 92060 SENSORIMOTOR EXAMINATION: CPT | Mod: S$GLB,,, | Performed by: OPTOMETRIST

## 2019-10-07 PROCEDURE — 99999 PR PBB SHADOW E&M-EST. PATIENT-LVL II: ICD-10-PCS | Mod: PBBFAC,,, | Performed by: OPTOMETRIST

## 2019-10-07 RX ORDER — ERGOCALCIFEROL 1.25 MG/1
CAPSULE ORAL
COMMUNITY
End: 2022-01-12

## 2019-10-07 RX ORDER — AMLODIPINE BESYLATE 10 MG/1
10 TABLET ORAL DAILY
Refills: 2 | COMMUNITY
Start: 2019-08-13 | End: 2024-03-27 | Stop reason: SDUPTHER

## 2019-10-07 RX ORDER — PREDNISOLONE ACETATE 10 MG/ML
1 SUSPENSION/ DROPS OPHTHALMIC 4 TIMES DAILY
Qty: 5 ML | Refills: 0 | Status: SHIPPED | OUTPATIENT
Start: 2019-10-07 | End: 2021-08-04

## 2019-10-07 NOTE — LETTER
October 7, 2019      Gerard Cavazos III, MD  [address]  [address]  [facility] [address]           [facility]  [address]  [facility] [address]  Phone: [phone]  Fax: [phone]          Patient: Octavia Arrington   MR Number: [id number]   YOB: 1960   Date of Visit: 10/7/2019       Dear Dr. Gerard Cavazos III:    Thank you for referring Octavia Arrington to me for evaluation. Attached you will find relevant portions of my assessment and plan of care.    If you have questions, please do not hesitate to call me. I look forward to following Octavia Arrington along with you.    Sincerely,    Ning Cornejo, OD    Enclosure  CC:  No Recipients    If you would like to receive this communication electronically, please contact [email or web address] or [phone] to request more information on [employer] Link access.    For providers and/or their staff who would like to refer a patient to [facility], please contact us through our one-stop-shop provider referral line, [facility], at [phone].    If you feel you have received this communication in error or would no longer like to receive these types of communications, please e-mail [email or web address]

## 2019-10-08 ENCOUNTER — LAB VISIT (OUTPATIENT)
Dept: LAB | Facility: HOSPITAL | Age: 59
End: 2019-10-08
Attending: OPTOMETRIST
Payer: OTHER MISCELLANEOUS

## 2019-10-08 DIAGNOSIS — H20.9 ANTERIOR UVEITIS: ICD-10-CM

## 2019-10-08 LAB
CK SERPL-CCNC: 201 U/L (ref 20–180)
CRP SERPL-MCNC: 8 MG/L (ref 0–8.2)
ERYTHROCYTE [SEDIMENTATION RATE] IN BLOOD BY WESTERGREN METHOD: 24 MM/HR (ref 0–36)
RHEUMATOID FACT SERPL-ACNC: <10 IU/ML (ref 0–15)

## 2019-10-08 PROCEDURE — 86038 ANTINUCLEAR ANTIBODIES: CPT

## 2019-10-08 PROCEDURE — 82164 ANGIOTENSIN I ENZYME TEST: CPT

## 2019-10-08 PROCEDURE — 86780 TREPONEMA PALLIDUM: CPT

## 2019-10-08 PROCEDURE — 86431 RHEUMATOID FACTOR QUANT: CPT

## 2019-10-08 PROCEDURE — 36415 COLL VENOUS BLD VENIPUNCTURE: CPT

## 2019-10-08 PROCEDURE — 82550 ASSAY OF CK (CPK): CPT

## 2019-10-08 PROCEDURE — 86140 C-REACTIVE PROTEIN: CPT

## 2019-10-08 PROCEDURE — 85652 RBC SED RATE AUTOMATED: CPT

## 2019-10-08 PROCEDURE — 81374 HLA I TYPING 1 ANTIGEN LR: CPT | Mod: PO

## 2019-10-08 PROCEDURE — 86592 SYPHILIS TEST NON-TREP QUAL: CPT

## 2019-10-09 ENCOUNTER — TELEPHONE (OUTPATIENT)
Dept: OPTOMETRY | Facility: CLINIC | Age: 59
End: 2019-10-09

## 2019-10-09 LAB
ANA SER QL IF: NORMAL
RPR SER QL: NORMAL
T PALLIDUM AB SER QL IF: NORMAL

## 2019-10-10 LAB — ACE SERPL-CCNC: 18 U/L (ref 16–85)

## 2019-10-11 ENCOUNTER — OFFICE VISIT (OUTPATIENT)
Dept: NEUROLOGY | Facility: CLINIC | Age: 59
End: 2019-10-11
Payer: OTHER MISCELLANEOUS

## 2019-10-11 VITALS
WEIGHT: 257.69 LBS | DIASTOLIC BLOOD PRESSURE: 68 MMHG | HEIGHT: 66 IN | SYSTOLIC BLOOD PRESSURE: 112 MMHG | BODY MASS INDEX: 41.42 KG/M2 | HEART RATE: 100 BPM

## 2019-10-11 DIAGNOSIS — M54.81 BILATERAL OCCIPITAL NEURALGIA: ICD-10-CM

## 2019-10-11 DIAGNOSIS — G44.86 CERVICOGENIC HEADACHE: ICD-10-CM

## 2019-10-11 DIAGNOSIS — F07.81 POST-CONCUSSION SYNDROME: ICD-10-CM

## 2019-10-11 DIAGNOSIS — G43.009 MIGRAINE WITHOUT AURA AND WITHOUT STATUS MIGRAINOSUS, NOT INTRACTABLE: ICD-10-CM

## 2019-10-11 DIAGNOSIS — H51.9 CONVERGENCE INSUFFICIENCY OR PALSY IN BINOCULAR EYE MOVEMENT: ICD-10-CM

## 2019-10-11 DIAGNOSIS — G44.329 CHRONIC POST-TRAUMATIC HEADACHE, NOT INTRACTABLE: ICD-10-CM

## 2019-10-11 DIAGNOSIS — H81.90 VESTIBULOPATHY, UNSPECIFIED LATERALITY: ICD-10-CM

## 2019-10-11 DIAGNOSIS — S13.4XXD WHIPLASH INJURY TO NECK, SUBSEQUENT ENCOUNTER: ICD-10-CM

## 2019-10-11 DIAGNOSIS — S06.0X0D CONCUSSION WITHOUT LOSS OF CONSCIOUSNESS, SUBSEQUENT ENCOUNTER: Primary | ICD-10-CM

## 2019-10-11 PROCEDURE — 99214 PR OFFICE/OUTPT VISIT, EST, LEVL IV, 30-39 MIN: ICD-10-PCS | Mod: S$GLB,,, | Performed by: PSYCHIATRY & NEUROLOGY

## 2019-10-11 PROCEDURE — 99214 OFFICE O/P EST MOD 30 MIN: CPT | Mod: S$GLB,,, | Performed by: PSYCHIATRY & NEUROLOGY

## 2019-10-11 PROCEDURE — 99999 PR PBB SHADOW E&M-EST. PATIENT-LVL III: CPT | Mod: PBBFAC,,, | Performed by: PSYCHIATRY & NEUROLOGY

## 2019-10-11 PROCEDURE — 99999 PR PBB SHADOW E&M-EST. PATIENT-LVL III: ICD-10-PCS | Mod: PBBFAC,,, | Performed by: PSYCHIATRY & NEUROLOGY

## 2019-10-11 RX ORDER — TOPIRAMATE 50 MG/1
50 TABLET, FILM COATED ORAL NIGHTLY
Qty: 30 TABLET | Refills: 6 | Status: SHIPPED | OUTPATIENT
Start: 2019-10-11 | End: 2020-01-07 | Stop reason: SDUPTHER

## 2019-10-11 NOTE — PROGRESS NOTES
Subjective:       Patient ID: Octavia Arrington is a 59 y.o. female.    Reason for Consult: Concussion      Interval History:  Octavia Arrington is here for follow up. Their condition has improved. She notes her headaches have improved in terms of frequency and severity. She notes the Topamax helps her headaches, but she notes it is till an issue.  She is waiting for approval for eye drops prescribed by ophthalmology from her worker's comp. She notes her memory is at baseline, she blames old age. She only sleeps 5 hours per night, so this is a barrier to her recovery.  She notes that she is frustrated with her worker's comp insurance as they are not able to approve her necessary treatment in a timely manner.  She was only approved for 1 month of physical therapy which has not been enough.  She notes that she has had an improvement of her initial symptoms but notes she is only 60% back to her normal baseline.  She has been discharged from occupational therapy as she has deficits that require formal prism glasses therapy and eye drops.  These have not yet been approved by her worker's comp.  She notes she is still dealing with 4-5 days of headache per week or 20 days out of 30 of headache.  She notes her headaches are primarily when she gets up in the morning.  She notes that the low-dose Topamax has been helpful for her.  She notes that her main complaints today and that being her headaches and her visual issues.    Objective:     Vitals:    10/11/19 1457   BP: 112/68   Pulse: 100     Point of convergence is greater than 35 cm, abnormal.  Laina is abnormal 6/8/7, indicating vestibulopathy.  Tenderness to palpation bilateral cervical paraspinal musculature with positive muscle twitch response.  Tinel sign positive bilateral greater and lesser occipital nerve.  There is radiation of pain forward on her scalp.  Focused examination was undertaken today. Over 50% of face to face time of 25 minute visit time was in giving guidance,  counseling and discussing treatment options.    Assessment/Plan:     Problem List Items Addressed This Visit        ENT    Vestibulopathy    Relevant Orders    Ambulatory Referral to Physical/Occupational Therapy      Other Visit Diagnoses     Concussion without loss of consciousness, subsequent encounter    -  Primary    Relevant Orders    Ambulatory Referral to Physical/Occupational Therapy    Post-concussion syndrome        Relevant Orders    Ambulatory Referral to Physical/Occupational Therapy    Convergence insufficiency or palsy in binocular eye movement        Cervicogenic headache        Relevant Orders    Ambulatory Referral to Physical/Occupational Therapy    Bilateral occipital neuralgia        Relevant Orders    Ambulatory Referral to Physical/Occupational Therapy    Chronic post-traumatic headache, not intractable        Relevant Medications    topiramate (TOPAMAX) 50 MG tablet    Whiplash injury to neck, subsequent encounter        Relevant Orders    Ambulatory Referral to Physical/Occupational Therapy    Migraine without aura and without status migrainosus, not intractable        Relevant Medications    topiramate (TOPAMAX) 50 MG tablet        59-year-old female presents for evaluation of concussion suffered on 03/14/2019 when she was injured at work.  At this time she has not had a smooth recovery process, mainly due to the worker's Comp not approving her rehabilitative and therapeutic options that have been prescribed by the specialist that she has seen.  At this time I believe it is medically necessary that she have the plan that has been put in place by Ophthalmology approved because her visual issues are definitely a barrier to her recovery after her concussion.  I believe her cervical spine issues are also repeat a barrier to her recovery.  She needs at least 6 more weeks of physical therapy.  If she fails that then I would consider an MRI of the cervical spine.  I will also increase her Topamax  to 50 mg to help control her posttraumatic migrainous type headaches.  If that does not help alleviate her headaches and I would consider occipital nerve blocks based on the positive examination today for bilateral occipital neuralgia which is a subtype of posttraumatic headache.  I will see her back in 6-8 weeks.    The patient verbalizes understanding and agreement with the treatment plan. I have discussed risks, benefits and alternatives to the treatment plan. Questions were sought and answered to her stated verbal satisfaction.        Joce Cavazos MD    This note is dictated on M*Modal Fluency Direct word recognition program. There are word recognition mistakes that are occasionally missed on review.

## 2019-10-18 LAB
HLA-B27 RELATED AG QL: NEGATIVE
HLA-B27 RELATED AG QL: NORMAL

## 2019-10-21 ENCOUNTER — OFFICE VISIT (OUTPATIENT)
Dept: OPTOMETRY | Facility: CLINIC | Age: 59
End: 2019-10-21
Payer: COMMERCIAL

## 2019-10-21 DIAGNOSIS — H20.9 ANTERIOR UVEITIS: Primary | ICD-10-CM

## 2019-10-21 PROCEDURE — 99999 PR PBB SHADOW E&M-EST. PATIENT-LVL II: CPT | Mod: PBBFAC,,, | Performed by: OPTOMETRIST

## 2019-10-21 PROCEDURE — 92014 PR EYE EXAM, EST PATIENT,COMPREHESV: ICD-10-PCS | Mod: S$GLB,,, | Performed by: OPTOMETRIST

## 2019-10-21 PROCEDURE — 99999 PR PBB SHADOW E&M-EST. PATIENT-LVL II: ICD-10-PCS | Mod: PBBFAC,,, | Performed by: OPTOMETRIST

## 2019-10-21 PROCEDURE — 92014 COMPRE OPH EXAM EST PT 1/>: CPT | Mod: S$GLB,,, | Performed by: OPTOMETRIST

## 2019-10-21 NOTE — PROGRESS NOTES
HPI     Octavia Arrington is a 59 y.o. female who returns for continued eye care.    Octavia was initially examined by meon 10/7/19.  She was diagnosed with   anterior uveitis,and intermittent esotropia . Pred acetate 1% drops were   prescribed for use 4 times daily. Today, she reports that she has been   adherent to the treatment. She reports that her eyes don't hurt or feel   bad.  She adds that she is still having difficulty with reading.   (--)blurred vision  (--)Headaches  (--)diplopia  (--)flashes  (--)floaters  (--)pain  (--)Itching  (--)tearing  (--)burning  (--)Dryness  (--) OTC Drops  (--)Photophobia        Last edited by Ning Cornejo, OD on 10/21/2019  5:57 PM. (History)        Review of Systems   Constitutional: Negative for chills, fever and malaise/fatigue.   HENT: Negative for congestion and hearing loss.    Eyes: Positive for blurred vision. Negative for double vision, photophobia, pain, discharge and redness.   Respiratory: Negative.    Cardiovascular: Negative.    Gastrointestinal: Negative.    Genitourinary: Negative.    Musculoskeletal: Negative.    Skin: Negative.    Neurological: Negative for seizures.   Endo/Heme/Allergies: Negative for environmental allergies.   Psychiatric/Behavioral: Negative.        For exam results, see encounter report    Assessment /Plan     1. Anterior uveitis - Both Eyes --> quiet  - Will recheck CPK level with blood work in 1 month  - Ok to discontinue pred acetate 1% drops    2. Esotropia and hypertropia exacerbated with concussion  - Prism glasses  Glasses Prescription (10/7/2019)        Sphere Cylinder Dist [place] Add Horz Prism Vert Prism    Right +0.50 Sphere 20/20 +2.75 1.0 out 0.5 down    Left -0.25 Sphere 20/20 +2.75 1.0 out 0.5 up    Type:  PAL    Expiration Date:  10/7/2020          Patient education; RTC as needed

## 2019-11-04 ENCOUNTER — CLINICAL SUPPORT (OUTPATIENT)
Dept: REHABILITATION | Facility: HOSPITAL | Age: 59
End: 2019-11-04
Payer: OTHER MISCELLANEOUS

## 2019-11-04 DIAGNOSIS — R29.898 DECREASED ROM OF NECK: ICD-10-CM

## 2019-11-04 DIAGNOSIS — H81.90 VESTIBULOPATHY, UNSPECIFIED LATERALITY: ICD-10-CM

## 2019-11-04 DIAGNOSIS — R53.1 DECREASED STRENGTH: ICD-10-CM

## 2019-11-04 PROCEDURE — 97110 THERAPEUTIC EXERCISES: CPT | Mod: PN

## 2019-11-04 PROCEDURE — 97164 PT RE-EVAL EST PLAN CARE: CPT | Mod: PN

## 2019-11-07 NOTE — PLAN OF CARE
[facility]  Physical Therapy Neurological Rehabilitation Reassessment    Name: Octavia Arrington  Clinic Number: [id number]    Therapy Diagnosis:   Encounter Diagnoses   Name Primary?    Decreased ROM of neck     Decreased strength     Vestibulopathy, unspecified laterality      Physician: Gerard Cavazos III, MD    Physician Orders: PT Eval and Treat   Medical Diagnosis from Referral:   S06.0X0D (ICD-10-CM) - Concussion without loss of consciousness, subsequent encounter   H81.90 (ICD-10-CM) - Vestibulopathy, unspecified laterality   F07.81 (ICD-10-CM) - Post-concussion syndrome   R51 (ICD-10-CM) - Cervicogenic headache   M54.81 (ICD-10-CM) - Bilateral occipital neuralgia   S13.4XXD (ICD-10-CM) - Whiplash injury to neck, subsequent encounter     Reassessment Date: 11/4/2019  Authorization Period Expiration: 10/10/2020  Plan of Care Expiration: 2/4/2020  Visit # / Visits authorized: 1/ 12    Time In: 3:06pm  Time Out: 3:56pm  Total Billable Time: 50 minutes    Precautions: Standard    Pain:  Current 1/10, worst 6/10, best 0/10   Location: on top of her head, occasional minimal neck pain   Description: Aching and Throbbing  Aggravating Factors: Morning  Easing Factors: pain medication      Objective       Visual/Auditory: States occasional blurred vision with reading or focusing in on object further away, driving  Tracking: Impaired: difficulty with tracking laterally. Nystagmus at end range of horizontal motion  Saccades: Impaired: end range horizontal L>R, excessive blinking  Acuity:NT  R/L discrimination: Intact  Visual field: Intact  Convergence: 27 cm    Gallito Hallpike: negative          Cervical ROM:  L SB: 45 degrees, pull on R  R SB: 40 degrees, minimal discomfort  Flexion: 60 degrees, pull in paraspinals  Extension: 50 degrees, discomfort in paraspinals   R rotation: 65 degrees, minimal pain  L rotation: 70 degrees, no pain    Strength: manual muscle test grades below   Upper Extremity  Strength   EDUARDO OGDEN   Shoulder Flexion: 4+/5 4+/5   Shoulder Abduction: 4+/5 4+/5   Shoulder Extension: 4+/5 4+/5   Shoulder External  Rotation:   4/5 4/5   Shoulder Internal  Rotation:   4/5 4/5   Elbow Flexion:     5/5 5/5   Elbow Extension: 5/5 5/5   Wrist Flexion: 5/5 5/5   Wrist Extension: 5/5 5/5   : NT NT        Evaluation   Single Limb Stance R LE 3  (<10 sec = HIGH FALL RISK)   Single Limb Stance L LE 3  (<10 sec = HIGH FALL RISK)   30 second Chair Rise NT   5 times sit-stand NT     Postural control:  MCTSIB:  1. Eyes Open/feet together/Firm: 30 seconds  2. Eyes Closed/feet together/Firm: 10 seconds  3. Eyes Open/feet together/Foam: NT seconds  4. Eyes Closed/feet together/Foam: NT seconds      TREATMENT   Treatment Time In: 3:46pm  Treatment Time Out: 3:56pm  Total Treatment time separate from Evaluation: 10 minutes    Octavia received therapeutic exercises to develop strength and endurance for 10 minutes including:    Horizontal smooth pursuit - 2x 30 seconds - no eye slippage, excess blinkage  Horizontal saccades @ 65 bpm 2 x 30 seconds standing at blank background excess blinkage - pt reported her eyes were dry and she was seeing floaters     Convergence push ups x 10    Home Exercises and Patient Education Provided    Education provided:   - importance of compliance to HEP  -POC    Written Home Exercises Provided: yes.  Exercises were reviewed and Octavia was able to demonstrate them prior to the end of the session.  Octavia demonstrated good  understanding of the education provided.     See EMR under Patient Instructions for exercises provided 11/4/2019.    Assessment   Octavia is a 59 y.o. female referred to outpatient Physical Therapy with a medical diagnosis of concussion without loss of consciousness, vestibulopathy, post concussion syndrome, cervicogenic headache, bilateral occipital neuralgia, and whiplash injury to neck. Pt presents with minimal neck pain, slight decreased cervical range of  motion, headaches, reported difficulty reading, vestibulo-occular impairments including: difficulty performing saccades, nystagmus at end range horizontal tracking, convergence point of 11 inches. Pt reports continued difficulty with dizziness when standing and mopping/vacuuming and bending forward to lift items off of the ground and returning to standing, difficulty focusing on images in foreground with distracting background. Pt previously attended OT/PT post concussion for same symptoms. She states she was feeling good until about 4 weeks ago when occular motor issues appeared again. Reports she has not been compliant with home exercise program. Is currently taking care of her sister with medical issues, increased stressed possibly contributing to symptoms. Plan to treat pt 1x/week for 12 weeks to address deficits.     Pt prognosis is Fair.   Pt will benefit from skilled outpatient Physical Therapy to address the deficits stated above and in the chart below, provide pt/family education, and to maximize pt's level of independence.     Plan of care discussed with patient: Yes  Pt's spiritual, cultural and educational needs considered and patient is agreeable to the plan of care and goals as stated below:     Anticipated Barriers for therapy: attendance to treatment, compliance with HEP      Goals:  Short Term Goals: 6 weeks   1. Pt will report compliance with HEP to improve carry over.  2. Pt will be able to perform saccades at 75 bpm without excessive blinking at blank background x 10.  3. Pt will be able to balance with narrow base of support and eyes closed for 30 seconds without LOB.  4. Pt will improve convergence to </= 17 cm to improve oculomotor control.     Long Term Goals: 12 weeks   1. Pt will report ability to read for 10 minutes without vision changes.  2. Pt will perform 10 reps of smooth tracking without nystagmus at end range horizontal motion.  3. Pt will be able to perform saccades at 90 bpm in  dynamic background without excessive blinking or visual strain.  4. Pt will improve convergence to </= 10 cm to improve oculomotor control.   5. Pt will improve cervical SB bilaterally by 10 degrees without pain to improve AROM and return to functional activities without increase in symptoms.     Plan   Plan of care Certification: 11/4/2019 to 2/4/2020.    Outpatient Physical Therapy 1 times weekly for 12 weeks to include the following interventions: Cervical/Lumbar Traction, Electrical Stimulation, Gait Training, Manual Therapy, Moist Heat/ Ice, Neuromuscular Re-ed, Orthotic Management and Training, Patient Education, Self Care, Therapeutic Activites, Therapeutic Exercise and Ultrasound.     Lani Munoz, PT

## 2019-11-22 ENCOUNTER — TELEPHONE (OUTPATIENT)
Dept: NEUROLOGY | Facility: CLINIC | Age: 59
End: 2019-11-22

## 2019-11-27 ENCOUNTER — CLINICAL SUPPORT (OUTPATIENT)
Dept: REHABILITATION | Facility: HOSPITAL | Age: 59
End: 2019-11-27
Payer: OTHER MISCELLANEOUS

## 2019-11-27 DIAGNOSIS — R53.1 DECREASED STRENGTH: ICD-10-CM

## 2019-11-27 DIAGNOSIS — R29.898 DECREASED ROM OF NECK: ICD-10-CM

## 2019-11-27 DIAGNOSIS — H81.90 VESTIBULOPATHY, UNSPECIFIED LATERALITY: ICD-10-CM

## 2019-11-27 PROCEDURE — 97112 NEUROMUSCULAR REEDUCATION: CPT | Mod: PN

## 2019-11-27 NOTE — PROGRESS NOTES
Physical Therapy Daily Treatment Note     Name: Octavia Arrington  Clinic Number: [id number]    Therapy Diagnosis:    Decreased ROM of neck      Decreased strength      Vestibulopathy, unspecified laterality        Physician: Gerard Cavazos III, MD    Visit Date: 11/27/2019    Physician Orders: PT Eval and Treat, therapeutic exercise passive, active resistive, cervical traction  Medical Diagnosis from Referral:   S06.0X0D (ICD-10-CM) - Concussion without loss of consciousness, subsequent encounter   S13.4XXA (ICD-10-CM) - Whiplash, initial encounter   H81.90 (ICD-10-CM) - Vestibulopathy, unspecified laterality   F07.81 (ICD-10-CM) - Post-concussion syndrome   R51 (ICD-10-CM) - Cervicogenic headache   M54.81 (ICD-10-CM) - Bilateral occipital neuralgia         Reassessment Date: 12/6/2019  Authorization Period Expiration: 10/10/2020  Plan of Care Expiration: 2/4/2020  Visit # / Visits authorized: 2/ 12     Time In: 1343  Time Out: 1428  Total Billable Time: 45 minutes    Precautions: Standard      Subjective     Pt reports: Feels pretty good today, no pain or dizziness currently.        Pain: 0 /10    Location:  n/a  Objective     Octavia participated in neuromuscular re-education activities to improve: Balance, Coordination, Kinesthetic, Sense, Proprioception, Posture and oculomotor performance for 40 minutes. The following activities were included:    Oculomotor performance:   Smooth Pursuits:  Reading black and white numbers on card:  2 x 30 seconds horizontal, no increased dizziness, slight visual slippage, jerkiness of eyes noted  2 x 30 seconds vertical, no increased dizziness, no slippage noted  2 x 30 seconds  LUQ>RLQ, no increased dizziness, slight slippage noted  2 x 30 seconds LLQ>RUQ, no increased dizziness, slight slippage noted    Saccades: horizontal with targets arm's length away and shoulder width apart  2 x 30 seconds at 70 bpm, no visual slippage, no increased dizziness, appropriately  "challenging    VOR1:   X 30 seconds at 70 bpm, no increased dizziness, no slippage noted  X 30 seconds at 80bpm, no increased dizziness, no slippage noted, appropriately challenging    Convergence:   x10 pencil push ups with 5 second hold    Habituation:   Placing 6 cones from floor<>mat while seated, 3 trials, slight increased "tightness" sensation on top of head    // bars:   Tandem stance 2 x 30 seconds B LE lead, fair balance  x30 seconds on foam NBOS, good balance  2 x 30 seconds on foam, head turns L/R, fair balance  2 x 30 seconds on foam head turns up/down, fair balance    Octavia received therapeutic exercises to develop strength, endurance, ROM, flexibility, posture and core stabilization for 5 minutes including:    Upper trap stretch 2x30"  Levator scap stretch 2x30"     Octavia received the following manual therapy techniques: Manual traction, Myofacial release and Soft tissue Mobilization were applied to the: R upper quadrant for 0 minutes, including:       Home Exercises Provided and Patient Education Provided     Education provided:   - continue with HEP    Written Home Exercises Provided: continue with previous HEP; add chin tucks  Exercises were reviewed and Octavia was able to demonstrate them prior to the end of the session.  Octavia demonstrated good  understanding of the education provided.      See EMR under Patient Instructions for exercises provided 6/28/2019.    Assessment     Pt tolerated PT session well today. She demonstrates deficits with smooth pursuits as well as other oculomotor exercises today. Pt's progress is limited by her decreased compliance with attendance to sessions. Pt reports she does not get dizzy but feels a tightness sensation in the top of her head following habituation and balance activities. She remains appropriate for skilled PT services in order to decrease her tightness feeling and improve her oculomotor performance.     Octavia is progressing well towards her goals. "   Pt prognosis is Good.     Pt will continue to benefit from skilled outpatient physical therapy to address the deficits listed in the problem list box on initial evaluation, provide pt/family education and to maximize pt's level of independence in the home and community environment.     Pt's spiritual, cultural and educational needs considered and pt agreeable to plan of care and goals.     Anticipated barriers to physical therapy: attendance to treatment, compliance with HEP       Goals:  Short Term Goals: 6 weeks   1. Pt will report compliance with HEP to improve carry over.  2. Pt will be able to perform saccades at 75 bpm without excessive blinking at blank background x 10.  3. Pt will be able to balance with narrow base of support and eyes closed for 30 seconds without LOB.  4. Pt will improve convergence to </= 17 cm to improve oculomotor control.      Long Term Goals: 12 weeks   1. Pt will report ability to read for 10 minutes without vision changes.  2. Pt will perform 10 reps of smooth tracking without nystagmus at end range horizontal motion.  3. Pt will be able to perform saccades at 90 bpm in dynamic background without excessive blinking or visual strain.  4. Pt will improve convergence to </= 10 cm to improve oculomotor control.   5. Pt will improve cervical SB bilaterally by 10 degrees without pain to improve AROM and return to functional activities without increase in symptoms.      Plan   Plan of care Certification: 11/4/2019 to 2/4/2020.    Continue to work on oculomotor performance, improving balance, and habituation tasks.     Noel Benitez, PT, DPT  11/27/2019

## 2019-12-02 ENCOUNTER — CLINICAL SUPPORT (OUTPATIENT)
Dept: REHABILITATION | Facility: HOSPITAL | Age: 59
End: 2019-12-02
Payer: OTHER MISCELLANEOUS

## 2019-12-02 DIAGNOSIS — R29.898 DECREASED ROM OF NECK: ICD-10-CM

## 2019-12-02 DIAGNOSIS — H81.90 VESTIBULOPATHY, UNSPECIFIED LATERALITY: ICD-10-CM

## 2019-12-02 DIAGNOSIS — R53.1 DECREASED STRENGTH: ICD-10-CM

## 2019-12-02 PROCEDURE — 97112 NEUROMUSCULAR REEDUCATION: CPT | Mod: PN | Performed by: PHYSICAL THERAPIST

## 2019-12-03 NOTE — PLAN OF CARE
"  Physical Therapy Daily Treatment Note     Name: Octavia Arrington  Clinic Number: [id number]    Therapy Diagnosis:    Decreased ROM of neck      Decreased strength      Vestibulopathy, unspecified laterality        Physician: Gerard Cavazos III, MD    Visit Date: 12/2/2019    Physician Orders: PT Eval and Treat, therapeutic exercise passive, active resistive, cervical traction  Medical Diagnosis from Referral:   S06.0X0D (ICD-10-CM) - Concussion without loss of consciousness, subsequent encounter   S13.4XXA (ICD-10-CM) - Whiplash, initial encounter   H81.90 (ICD-10-CM) - Vestibulopathy, unspecified laterality   F07.81 (ICD-10-CM) - Post-concussion syndrome   R51 (ICD-10-CM) - Cervicogenic headache   M54.81 (ICD-10-CM) - Bilateral occipital neuralgia         Reassessment Due: 12/6/2019  Authorization Period Expiration: 10/10/2020  Plan of Care Expiration: 2/4/2020  Visit # / Visits authorized: 3/ 12     Time In: 1522  Time Out: 1600  Total Billable Time: 38 minutes    Precautions: Standard      Subjective     Pt reports: Feels pretty good today, no pain or dizziness currently. Symptoms go back and forth. Reports "funny feeling on top of the head" as primary complaint  Pt was complaint with HEP     Pain: 0 /10    Location:  n/a  Objective     Octavia participated in neuromuscular re-education activities to improve: Balance, Coordination, Kinesthetic, Sense, Proprioception, Posture and oculomotor performance for 38 minutes. The following activities were included:    Oculomotor performance:   Smooth Pursuits:  Reading black and white words on card:  2 x 30 seconds horizontal, no increased dizziness, slight visual slippage, jerkiness of eyes noted, challenging  2 x 30 seconds vertical, no increased dizziness, no slippage noted  2 x 30 seconds  LUQ>RLQ, no increased dizziness, slight slippage noted  2 x 30 seconds LLQ>RUQ, no increased dizziness, slight slippage noted    Saccades: horizontal and verticalwith targets arm's " length away and shoulder width apart  2 x 30 seconds at 85 bpm, no visual slippage, no increased dizziness, appropriately challenging    VOR1:   X 30 seconds at 80bpm, no increased dizziness, no slippage noted, appropriately challenging    Vertical ball toss to self while walking 2 x 50 ft- fair- balance  Ball toss hand to hand while walking 2 x 50 ft- fair balance  Around the world (circles ball in place) 5x    // bars:   2 x 30 seconds on foam, head turns L/R, fair balance  2 x 30 seconds on foam head turns up/down, fair balance  x 30 sec on firm, eyes closed, fair balance  2 x 30 sec on foam, eyes closed, fair- balance    Home Exercises Provided and Patient Education Provided     Education provided:   - continue with HEP, increase business of background for saccades    Written Home Exercises Provided: continue with previous HEP;   Exercises were reviewed and Octavia was able to demonstrate them prior to the end of the session.  Octavia demonstrated good  understanding of the education provided.      See EMR under Patient Instructions for exercises provided 6/28/2019.    Assessment   Assessment period: 11/6/2019 to 12/2/2019.    Octavia tolerates PT sessions well, but has only attended 2 sessions since re evaluation. Pt reports good compliance with saccades as HEP but reports performing with a non-stimulating background. Pt is tolerating increase speed of saccades (up to 85 bpm, normal= 120 bpm). Pt reports that she has been assessed for prism glasses and is awaiting her prescription. pt is still unable to read like she used to for enjoyment. Convergence point is improving, but is still abnormal. Pt demonstrates improved balance with decreased visual support, indicating an improvement in the function of her vestibular system. Pt could benefit from participating in the Functional Gait Assessment to yield further insight into vestibular function during ambulation. PT recommended that pt increase background stimulation  while performing oculomotor activities to speed recovery and tolerance to daily activities. Pt met short term goals for saccades, balance, and improved convergence point.  Pt can benefit from continued skilled PT to address remaining impairments to return to prior level of function.       Octavia is progressing well towards her goals.   Pt prognosis is Good.     Pt will continue to benefit from skilled outpatient physical therapy to address the deficits listed in the problem list box on initial evaluation, provide pt/family education and to maximize pt's level of independence in the home and community environment.     Pt's spiritual, cultural and educational needs considered and pt agreeable to plan of care and goals.     Anticipated barriers to physical therapy: attendance to treatment, compliance with HEP       Goals:  Formal status as of 12/2/2019  Short Term Goals: 6 weeks   1. Pt will report compliance with HEP to improve carry over. Ongoing- good compliance reported  2. Pt will be able to perform saccades at 75 bpm without excessive blinking at blank background x 10. Met 12/2/2019- 85 bpm  3. Pt will be able to balance with narrow base of support and eyes closed for 30 seconds without LOB. Met 12/2/19  4. Pt will improve convergence to </= 17 cm to improve oculomotor control. met 12/2/19- 23 cm     Long Term Goals: 12 weeks   1. Pt will report ability to read for 10 minutes without vision changes. ongoing  2. Pt will perform 10 reps of smooth tracking without nystagmus at end range horizontal motion. ongoing  3. Pt will be able to perform saccades at 90 bpm in dynamic background without excessive blinking or visual strain. ongoing  4. Pt will improve convergence to </= 10 cm to improve oculomotor control. ongoing  5. Pt will improve cervical SB bilaterally by 10 degrees without pain to improve AROM and return to functional activities without increase in symptoms. ongoing      Plan       Address saccades with  increased speed as tolerated, VOR activities. continue with balance with decreased visual support and on unstable surfaces.      Merari Dougherty, PT, DPT  12/2/2019

## 2019-12-09 ENCOUNTER — CLINICAL SUPPORT (OUTPATIENT)
Dept: REHABILITATION | Facility: HOSPITAL | Age: 59
End: 2019-12-09
Payer: OTHER MISCELLANEOUS

## 2019-12-09 DIAGNOSIS — H81.90 VESTIBULOPATHY, UNSPECIFIED LATERALITY: ICD-10-CM

## 2019-12-09 DIAGNOSIS — R53.1 DECREASED STRENGTH: ICD-10-CM

## 2019-12-09 DIAGNOSIS — R29.898 DECREASED ROM OF NECK: ICD-10-CM

## 2019-12-09 PROCEDURE — 97112 NEUROMUSCULAR REEDUCATION: CPT | Mod: PN | Performed by: PHYSICAL THERAPIST

## 2019-12-09 NOTE — PROGRESS NOTES
Physical Therapy Daily Treatment Note      Name: Octavia Arrington  Clinic Number: [id number]     Therapy Diagnosis:    Decreased ROM of neck      Decreased strength      Vestibulopathy, unspecified laterality           Physician: Gerard Cavazos III, MD     Visit Date: 12/9/2019      Physician Orders: PT Eval and Treat, therapeutic exercise passive, active resistive, cervical traction  Medical Diagnosis from Referral:   S06.0X0D (ICD-10-CM) - Concussion without loss of consciousness, subsequent encounter   S13.4XXA (ICD-10-CM) - Whiplash, initial encounter   H81.90 (ICD-10-CM) - Vestibulopathy, unspecified laterality   F07.81 (ICD-10-CM) - Post-concussion syndrome   R51 (ICD-10-CM) - Cervicogenic headache   M54.81 (ICD-10-CM) - Bilateral occipital neuralgia         Reassessment Due: 12/6/2019  Authorization Period Expiration: 10/10/2020  Plan of Care Expiration: 2/4/2020  Visit # / Visits authorized: 4/ 12     Time In: 1516  Time Out: 1600  Total Billable Time: 44 minutes     Precautions: Standard        Subjective      Pt reports: Feels pretty good today, no pain or dizziness currently.   Pt was complaint with HEP     Pain: 0 /10, minimal increase in symptoms (funny feeling in head) at conclusion of session  Location:  n/a  Objective      Octavia participated in neuromuscular re-education activities to improve: Balance, Coordination, Kinesthetic, Sense, Proprioception, Posture and oculomotor performance for 44 minutes. The following activities were included:     Oculomotor performance:   Smooth Pursuits:  Reading colored words on card:  2 x 30 seconds horizontal, no increased dizziness, slight visual slippage, jerkiness of eyes noted- reading word  2 x 30 seconds  LUQ>RLQ, no increased dizziness, slight slippage noted- reading color  2 x 30 seconds LLQ>RUQ, no increased dizziness, slight slippage noted- reading color     Saccades: horizontal and verticalwith targets arm's length away and shoulder width apart  2 x 30  "seconds at 90 bpm, no visual slippage, no increased dizziness, appropriately challenging     VOR1:   X 30 seconds at 90 bpm, no increased dizziness, no slippage noted, appropriately challenging     Vertical ball toss to self while walking 2 x 50 ft- fair balance  Ball toss hand to hand while walking 2 x 50 ft- fair balance  Around the world (circles ball in place) 5x- increase in dizziness     // bars:   2 x 30 seconds on foam, head turns L/R, fair balance  2 x 30 seconds on foam head turns up/down, fair balance  2 x 30 sec on foam, eyes closed, fair- balance  Tandem gait with intermittent UE, 2 laps  Tandem stance 2 x 30 sec     Home Exercises Provided and Patient Education Provided      Education provided:   - continue with HEP,      Written Home Exercises Provided: continue with previous HEP;   Exercises were reviewed and Octavia was able to demonstrate them prior to the end of the session.  Octavia demonstrated good  understanding of the education provided.      See EMR under Patient Instructions for exercises provided 6/28/2019.     Assessment   Octavia tolerated today's PT session well. Pt was able to tolerate increased speeds for saccades and vestibulo ocular reflex. Pt's cognitive task with smooth pursuits was increased in difficulty. Pt reported significant dizziness with around the world exercise. Minimal increase in dizziness during session, but per pt increase in "feeling funny in the head".  Pt can benefit from continued skilled PT to address remaining impairments to return to prior level of function.       Octavia is progressing well towards her goals.   Pt prognosis is Good.      Pt will continue to benefit from skilled outpatient physical therapy to address the deficits listed in the problem list box on initial evaluation, provide pt/family education and to maximize pt's level of independence in the home and community environment.      Pt's spiritual, cultural and educational needs considered and pt " agreeable to plan of care and goals.     Anticipated barriers to physical therapy: attendance to treatment, compliance with HEP        Goals:  Formal status as of 12/2/2019  Short Term Goals: 6 weeks   1. Pt will report compliance with HEP to improve carry over. Ongoing- good compliance reported  2. Pt will be able to perform saccades at 75 bpm without excessive blinking at blank background x 10. Met 12/2/2019- 85 bpm  3. Pt will be able to balance with narrow base of support and eyes closed for 30 seconds without LOB. Met 12/2/19  4. Pt will improve convergence to </= 17 cm to improve oculomotor control. met 12/2/19- 23 cm     Long Term Goals: 12 weeks   1. Pt will report ability to read for 10 minutes without vision changes. ongoing  2. Pt will perform 10 reps of smooth tracking without nystagmus at end range horizontal motion. ongoing  3. Pt will be able to perform saccades at 90 bpm in dynamic background without excessive blinking or visual strain. ongoing  4. Pt will improve convergence to </= 10 cm to improve oculomotor control. ongoing  5. Pt will improve cervical SB bilaterally by 10 degrees without pain to improve AROM and return to functional activities without increase in symptoms. ongoing      Plan         Address saccades with increased speed as tolerated, VOR activities. continue with balance with decreased visual support on unstable surfaces, may progress to Bosu ball.       Merari Dougherty, PT, DPT

## 2019-12-16 ENCOUNTER — CLINICAL SUPPORT (OUTPATIENT)
Dept: REHABILITATION | Facility: HOSPITAL | Age: 59
End: 2019-12-16
Payer: OTHER MISCELLANEOUS

## 2019-12-16 DIAGNOSIS — H81.90 VESTIBULOPATHY, UNSPECIFIED LATERALITY: ICD-10-CM

## 2019-12-16 DIAGNOSIS — R53.1 DECREASED STRENGTH: ICD-10-CM

## 2019-12-16 DIAGNOSIS — R29.898 DECREASED ROM OF NECK: ICD-10-CM

## 2019-12-16 PROCEDURE — 97112 NEUROMUSCULAR REEDUCATION: CPT | Mod: PN | Performed by: PHYSICAL THERAPIST

## 2019-12-16 NOTE — PROGRESS NOTES
"Physical Therapy Daily Treatment Note      Name: Octavia Arrington  Clinic Number: [id number]     Therapy Diagnosis:    Decreased ROM of neck      Decreased strength      Vestibulopathy, unspecified laterality           Physician: Gerard Cavazos III, MD     Visit Date: 12/16/2019      Physician Orders: PT Eval and Treat, therapeutic exercise passive, active resistive, cervical traction  Medical Diagnosis from Referral:   S06.0X0D (ICD-10-CM) - Concussion without loss of consciousness, subsequent encounter   S13.4XXA (ICD-10-CM) - Whiplash, initial encounter   H81.90 (ICD-10-CM) - Vestibulopathy, unspecified laterality   F07.81 (ICD-10-CM) - Post-concussion syndrome   R51 (ICD-10-CM) - Cervicogenic headache   M54.81 (ICD-10-CM) - Bilateral occipital neuralgia         Reassessment Due: 1/2/2020  Authorization Period Expiration: 10/10/2020  Plan of Care Expiration: 2/4/2020  Visit # / Visits authorized: 5/ 12     Time In: 1530  Time Out: 1610  Total Billable Time: 40 minutes     Precautions: Standard        Subjective      Pt reports: Feels bad today, reports low back pain, unable to indicate injury. 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   Pt was complaint with HEP a "few times"   Pt reported increase in headache requiring rest after last PT session, felt fine the following day  Progress towards goals: improved speed of oculomotor function noted     Pain: 0 /10, minimal increase in symptoms (funny feeling in head) at conclusion of session  Location:  n/a  Objective      Octavia participated in neuromuscular re-education activities to improve: Balance, Coordination, Kinesthetic, Sense, Proprioception, Posture and oculomotor performance for 40minutes. The following activities were included:     Oculomotor performance:   Smooth Pursuits: with prism glasses on  Reading colored words on card:  2 x 30 seconds horizontal, no increased dizziness, slight visual " slippage, jerkiness of eyes noted- reading word  2 x 30 seconds vertical, no increased dizziness, slight visual slippage, jerkiness of eyes noted- reading color  2 x 30 seconds  LUQ>RLQ, no increased dizziness, slight slippage noted- reading word  2 x 30 seconds LLQ>RUQ, no increased dizziness, slight slippage noted- reading color on card with shapes     Saccades: horizontal and vertical with targets arm's length away and shoulder width apart  2 x 30 seconds at 95 bpm, no visual slippage, no increased dizziness, appropriately challenging     VOR1:   2 X 30 seconds at  bpm, no increased dizziness, no slippage noted, appropr     Vertical ball toss to self while walking x 50 ft front/ 50 ft back- fair balance  Ball toss hand to hand while walking x 50 ft/ 50 ft back- fair balance  Around the world (circles ball in place) 5x- increase in dizziness     // bars:   Tandem gait with no UE, 2 laps  Tandem stance 2 x 30 sec     Home Exercises Provided and Patient Education Provided      Education provided:   - continue with HEP,      Written Home Exercises Provided: continue with previous HEP;   Exercises were reviewed and Octavia was able to demonstrate them prior to the end of the session.  Octavia demonstrated good  understanding of the education provided.      See EMR under Patient Instructions for exercises provided 6/28/2019.     Assessment   Octavia tolerated today's PT session well. Pt reported an increase in symptoms (weird feeling in head + fatigue) after the last PT session. This was resolved by the following day. Pt was able to tolerate increased speeds for saccades and vestibulo ocular reflex. Pt was not able to tolerate standing on foam or dynamic trunk movement with around the world exercise due to reported low back pain today. Pt reported a slight improvement in tolerance to smooth pursuits with use of prism glasses. PT advanced pt to walking backwards with self ball toss, she demonstrated fair- balance.    Pt can benefit from continued skilled PT to address remaining impairments to return to prior level of function.       Octavia is progressing well towards her goals.   Pt prognosis is Good.      Pt will continue to benefit from skilled outpatient physical therapy to address the deficits listed in the problem list box on initial evaluation, provide pt/family education and to maximize pt's level of independence in the home and community environment.      Pt's spiritual, cultural and educational needs considered and pt agreeable to plan of care and goals.     Anticipated barriers to physical therapy: attendance to treatment, compliance with HEP        Goals:  Formal status as of 12/2/2019  Short Term Goals: 6 weeks   1. Pt will report compliance with HEP to improve carry over. Ongoing- good compliance reported  2. Pt will be able to perform saccades at 75 bpm without excessive blinking at blank background x 10. Met 12/2/2019- 85 bpm  3. Pt will be able to balance with narrow base of support and eyes closed for 30 seconds without LOB. Met 12/2/19  4. Pt will improve convergence to </= 17 cm to improve oculomotor control. met 12/2/19- 23 cm     Long Term Goals: 12 weeks   1. Pt will report ability to read for 10 minutes without vision changes. ongoing  2. Pt will perform 10 reps of smooth tracking without nystagmus at end range horizontal motion. ongoing  3. Pt will be able to perform saccades at 90 bpm in dynamic background without excessive blinking or visual strain. ongoing  4. Pt will improve convergence to </= 10 cm to improve oculomotor control. ongoing  5. Pt will improve cervical SB bilaterally by 10 degrees without pain to improve AROM and return to functional activities without increase in symptoms. ongoing      Plan         Address saccades with increased speed as tolerated, VOR activities. continue with balance with decreased visual support on unstable surfaces, may progress to Bosu ball.  resume use of foam as  low back pain resolves     Merari Dougherty, PT, DPT

## 2019-12-30 ENCOUNTER — CLINICAL SUPPORT (OUTPATIENT)
Dept: REHABILITATION | Facility: HOSPITAL | Age: 59
End: 2019-12-30
Payer: OTHER MISCELLANEOUS

## 2019-12-30 DIAGNOSIS — R29.898 DECREASED ROM OF NECK: ICD-10-CM

## 2019-12-30 DIAGNOSIS — R53.1 DECREASED STRENGTH: ICD-10-CM

## 2019-12-30 DIAGNOSIS — H81.90 VESTIBULOPATHY, UNSPECIFIED LATERALITY: ICD-10-CM

## 2019-12-30 PROCEDURE — 97112 NEUROMUSCULAR REEDUCATION: CPT | Mod: PN | Performed by: PHYSICAL THERAPIST

## 2019-12-30 PROCEDURE — 97110 THERAPEUTIC EXERCISES: CPT | Mod: PN | Performed by: PHYSICAL THERAPIST

## 2019-12-30 NOTE — PATIENT INSTRUCTIONS
Angry Cat Stretch        Tuck chin and tighten stomach, arching back.  Repeat 5 times per set. Do 1 sets per session. Do 1 sessions per day.     [email or web address]         Copyright © [employer]. All rights reserved.     Lumbar Rotation: Caudal - Bilateral, Advanced (Supine)        Feet and knees together, arms outstretched, bring knees toward chest. Rotate knees to left, turning head in opposite direction until stretch is felt. Hold 5-10 seconds. Relax.  Repeat 5-10 times per set. Do 1 sets per session. Do 1 sessions per day.     [email or web address]     Copyright © [employer]. All rights reserved.     BACK: Child's Pose (Sciatica)        Sit in knee-chest position and reach arms forward. Separate knees for comfort. Hold position for 10-20 seconds.  Repeat 2-3 times. Do 1 times per day.    Copyright © [employer]. All rights reserved.   Side Waist Stretch from Childs Pose        From child's pose, walk hands to left. Reach right hand out on diagonal. Reach hips back toward heels making a C with torso. Breathe into right side waist.  Hold for 10-20 breaths. Repeat 2-3 times each side.    Copyright © [employer]. All rights reserved.

## 2019-12-31 NOTE — PLAN OF CARE
Physical Therapy Daily Treatment Note      Name: Octavia Arrington  Clinic Number: [id number]     Therapy Diagnosis:    Decreased ROM of neck      Decreased strength      Vestibulopathy, unspecified laterality           Physician: Gerard Cavazos III, MD     Visit Date: 12/30/2019      Physician Orders: PT Eval and Treat, therapeutic exercise passive, active resistive, cervical traction  Medical Diagnosis from Referral:   S06.0X0D (ICD-10-CM) - Concussion without loss of consciousness, subsequent encounter   S13.4XXA (ICD-10-CM) - Whiplash, initial encounter   H81.90 (ICD-10-CM) - Vestibulopathy, unspecified laterality   F07.81 (ICD-10-CM) - Post-concussion syndrome   R51 (ICD-10-CM) - Cervicogenic headache   M54.81 (ICD-10-CM) - Bilateral occipital neuralgia      Authorization Period Expiration: 10/10/2020  Plan of Care Expiration: 2/4/2020  Visit # / Visits authorized: 6/ 12     Time In: 1645  Time Out: 1740  Total Billable Time: 55 minutes     Precautions: Standard        Subjective      Pt reports: I'm tired today  Pt was complaint with HEP for saccades mostly, denies pencil push ups  Pt reported increase in headache after last PT session, felt fine the following day  Progress towards goals: ongoing, increased tolerance to oculomotor tasks     Pain: reports intermittent R sided low back pain, did not rate- was not an issue during today's session  Location:  n/a  Objective      Octavia participated in neuromuscular re-education activities to improve: Balance, Coordination, Kinesthetic, Sense, Proprioception, Posture and oculomotor performance for 45 minutes. The following activities were included:     Oculomotor performance:   Smooth Pursuits: with prism glasses on  Reading colored words on card:  2 x 30 seconds horizontal, no increased dizziness, slight visual slippage, jerkiness of eyes noted- reading word, then reading color  2 x 30 seconds vertical, no increased dizziness, slight visual slippage, jerkiness of eyes  noted- reading word, then reading color  Card with shapes:   2 x 30 seconds  LUQ>RLQ, no increased dizziness, slight slippage noted- reading color of shape, then shape  2 x 30 seconds LLQ>RUQ, no increased dizziness, slight slippage noted-  eading color of shape, then shape      Saccades: horizontal and vertical with targets arm's length away and shoulder width apart  1 x 30 seconds at 100 bpm, no visual slippage, no increased dizziness, appropriately challenging     VOR1:   2 X 30 seconds at 100 bpm, no increased dizziness, no slippage noted, appropr     Vertical ball toss to self while walking x 50 ft front/ 50 ft back- fair balance  Ball toss hand to hand while walking x 50 ft/ 50 ft back- fair balance  Around the world (circles ball in place) 5x- increase in dizziness     // bars:   Tandem stance 2 x 30 sec= poor+ to fair- balance  Static standing on Bosu 2 x 30 sec= poor to poor+     Octavia received therapeutic exercises to develop flexibility for 10 minutes including:    Reviewed stretches to address low back pain:   lower trunk rotation 5x  Cat camel- 5x  Child's pose- each direction     Home Exercises Provided and Patient Education Provided      Education provided:   - continue with HEP  - provided with stretches to address low back pain       Written Home Exercises Provided: continue with previous HEP + added stretches for low back pain   Exercises were reviewed and Octavia was able to demonstrate them prior to the end of the session.  Octavia demonstrated good  understanding of the education provided.      See EMR under Patient Instructions for exercises provided 6/28/2019. And 12/30/2019     Assessment     Assessment period: 12/16/2019 to 12/30/2019. Pt attended 2 PT sessions since last reassessment due to work schedule and  Scheduling availability.   Octavia tolerates PT sessions well. Pt reports R sided low back pain, unable to state a specific mechanism of injury but reports increased standing/ ambulating  this past weekend. Pt reports an increase in headaches, but attributes this to being out of Topamax. Pt reports consistency with use of prism glasses. Pt continues to report an increase in headache intensity (located at top of head) after performing oculomotor activities (worst with vestibulo ocular reflex practice/ stimulation). Pt is demonstrating good improvements with quality of saccades and can tolerate at least 100 bpm (normal=120 bpm +/- 20% for age related changes). She reports an improvement in tolerance to reading, but denies that she cannot read for longer than 5-7 minutes prior to increase in symptoms. end range nystagmus is noted in the left eye with lateral movement after ~3 repetitions. Convergence point is inconsistent as compared to last assessment, possibly due to the lack of practice per pt report. Pt demonstrates imbalance on unstable surfaces and with backwards stepping during stimulation of vestibulo ocular system.  Pt was encouraged to increase participation with convergence exercises to improve oculomotor consistencies noted. Pt was provided with stretches to address low back pain.  Pt can benefit from continued skilled PT to address remaining impairments to return to prior level of function (tolerating daily activities without a significant increase in headaches).       Octavia is progressing well towards her goals.   Pt prognosis is Good.      Pt will continue to benefit from skilled outpatient physical therapy to address the deficits listed in the problem list box on initial evaluation, provide pt/family education and to maximize pt's level of independence in the home and community environment.      Pt's spiritual, cultural and educational needs considered and pt agreeable to plan of care and goals.     Anticipated barriers to physical therapy: attendance to treatment, compliance with HEP        Goals:  Formal status as of 12/30/2019  Short Term Goals: 6 weeks   1. Pt will report compliance  with HEP to improve carry over. Ongoing- good compliance reported  2. Pt will be able to perform saccades at 75 bpm without excessive blinking at blank background x 10. Met 12/2/2019- 85 bpm  3. Pt will be able to balance with narrow base of support and eyes closed for 30 seconds without LOB. Met 12/2/19  4. Pt will improve convergence to </= 17 cm to improve oculomotor control. met 12/2/19- 23 cm     Long Term Goals: 12 weeks   1. Pt will report ability to read for 10 minutes without vision changes. Improved- 5-7 minutes  2. Pt will perform 10 reps of smooth tracking without nystagmus at end range horizontal motion. Ongoing- ~3 repetitions before nystagmus noted endrange L eye  3. Pt will be able to perform saccades at 90 bpm in dynamic background without excessive blinking or visual strain. Met 12/30/19  4. Pt will improve convergence to </= 10 cm to improve oculomotor control. Inconsistent- 28 cm  5. Pt will improve cervical SB bilaterally by 10 degrees without pain to improve AROM and return to functional activities without increase in symptoms. ongoing      Plan         Address VOR activities and smooth pursuits with increased difficulty. May delete saccade practice in session. continue with balance with decreased visual support on unstable surfaces, continue with use of Bosu and progress as tolerated     Merari Dougherty, PT, DPT

## 2020-01-07 ENCOUNTER — OFFICE VISIT (OUTPATIENT)
Dept: NEUROLOGY | Facility: CLINIC | Age: 60
End: 2020-01-07
Payer: OTHER MISCELLANEOUS

## 2020-01-07 VITALS
HEART RATE: 95 BPM | DIASTOLIC BLOOD PRESSURE: 88 MMHG | BODY MASS INDEX: 41.38 KG/M2 | HEIGHT: 66 IN | SYSTOLIC BLOOD PRESSURE: 134 MMHG | WEIGHT: 257.5 LBS

## 2020-01-07 DIAGNOSIS — G44.329 CHRONIC POST-TRAUMATIC HEADACHE, NOT INTRACTABLE: ICD-10-CM

## 2020-01-07 DIAGNOSIS — F07.81 POST-CONCUSSION SYNDROME: ICD-10-CM

## 2020-01-07 DIAGNOSIS — S06.0X0D CONCUSSION WITHOUT LOSS OF CONSCIOUSNESS, SUBSEQUENT ENCOUNTER: Primary | ICD-10-CM

## 2020-01-07 DIAGNOSIS — H81.90 VESTIBULOPATHY, UNSPECIFIED LATERALITY: ICD-10-CM

## 2020-01-07 DIAGNOSIS — H51.9 CONVERGENCE INSUFFICIENCY OR PALSY IN BINOCULAR EYE MOVEMENT: ICD-10-CM

## 2020-01-07 DIAGNOSIS — M54.81 BILATERAL OCCIPITAL NEURALGIA: ICD-10-CM

## 2020-01-07 DIAGNOSIS — G43.009 MIGRAINE WITHOUT AURA AND WITHOUT STATUS MIGRAINOSUS, NOT INTRACTABLE: ICD-10-CM

## 2020-01-07 DIAGNOSIS — S13.4XXD WHIPLASH INJURY TO NECK, SUBSEQUENT ENCOUNTER: ICD-10-CM

## 2020-01-07 DIAGNOSIS — G44.86 CERVICOGENIC HEADACHE: ICD-10-CM

## 2020-01-07 PROCEDURE — 99999 PR PBB SHADOW E&M-EST. PATIENT-LVL III: CPT | Mod: PBBFAC,,, | Performed by: PSYCHIATRY & NEUROLOGY

## 2020-01-07 PROCEDURE — 99214 PR OFFICE/OUTPT VISIT, EST, LEVL IV, 30-39 MIN: ICD-10-PCS | Mod: S$GLB,,, | Performed by: PSYCHIATRY & NEUROLOGY

## 2020-01-07 PROCEDURE — 99999 PR PBB SHADOW E&M-EST. PATIENT-LVL III: ICD-10-PCS | Mod: PBBFAC,,, | Performed by: PSYCHIATRY & NEUROLOGY

## 2020-01-07 PROCEDURE — 99214 OFFICE O/P EST MOD 30 MIN: CPT | Mod: S$GLB,,, | Performed by: PSYCHIATRY & NEUROLOGY

## 2020-01-07 RX ORDER — TOPIRAMATE 100 MG/1
100 TABLET, FILM COATED ORAL NIGHTLY
Qty: 30 TABLET | Refills: 6 | Status: SHIPPED | OUTPATIENT
Start: 2020-01-07 | End: 2020-04-08 | Stop reason: SDUPTHER

## 2020-01-07 NOTE — PROGRESS NOTES
Subjective:       Patient ID: Octavia Arrington is a 59 y.o. female.    Reason for Consult: Follow-up      Interval History:  Octavia Arrington is here for follow up. Their condition has somewhat clinically improved.  The patient notes that she is at least 65% back to baseline.  We have both discussed that if she got appropriate and timely rehabilitation she would likely have returned back to 100% for baseline from her injury sustained on 03/14/2019 at work.  She notes that her OT is ongoing.  She notes that she has been denied her eyedrops by her insurance and that she had to pay for her glasses out of pocket.  She notes that her headache medication has been delayed by her insurance by 3 weeks which has been detrimental to her recovery.      Objective:     Vitals:    01/07/20 1409   BP: 134/88   Pulse: 95     Patient is awake alert oriented to person place and time.  Moves all 4 extremities against gravity.  Gait and station within normal limits.  Cranial nerves 2-12 were without focal deficits.  Point of convergence is greater than 35 cm, abnormal.  Laina has improved but is still mildly abnormal 6/5/6, indicating vestibulopathy.  Focused examination was undertaken today. Over 50% of face to face time of 25 minute visit time was in giving guidance, counseling and discussing treatment options.      Assessment/Plan:     Problem List Items Addressed This Visit        ENT    Vestibulopathy      Other Visit Diagnoses     Concussion without loss of consciousness, subsequent encounter    -  Primary    Post-concussion syndrome        Convergence insufficiency or palsy in binocular eye movement        Cervicogenic headache        Bilateral occipital neuralgia        Chronic post-traumatic headache, not intractable        Relevant Medications    topiramate (TOPAMAX) 100 MG tablet    Whiplash injury to neck, subsequent encounter        Migraine without aura and without status migrainosus, not intractable        Relevant Medications     topiramate (TOPAMAX) 100 MG tablet        59-year-old female presents for evaluation of concussion sustained while at work on 03/14/2019.  At this time we have discussed increasing Topamax to 100 mg daily.  We are both pleased that she is slowly recovering over time.  I would like her to continue the rehabilitation efforts that her ongoing.  I would like her to continue treating with her ophthalmologist as well.  For now we have deferred nerve blocks for her occipital neuralgia.  She will work on head position while she is sleeping and the exercises that she has been taught in physical therapy.  She continues to work at this time.    The patient verbalizes understanding and agreement with the treatment plan. I have discussed risks, benefits and alternatives to the treatment plan. Questions were sought and answered to her stated verbal satisfaction.        Joce Cavazos MD    This note is dictated on M*[employer] Direct word recognition program. There are word recognition mistakes that are occasionally missed on review.

## 2020-01-09 ENCOUNTER — CLINICAL SUPPORT (OUTPATIENT)
Dept: REHABILITATION | Facility: HOSPITAL | Age: 60
End: 2020-01-09
Payer: OTHER MISCELLANEOUS

## 2020-01-09 DIAGNOSIS — R29.898 DECREASED ROM OF NECK: ICD-10-CM

## 2020-01-09 DIAGNOSIS — H81.90 VESTIBULOPATHY, UNSPECIFIED LATERALITY: ICD-10-CM

## 2020-01-09 DIAGNOSIS — R53.1 DECREASED STRENGTH: ICD-10-CM

## 2020-01-09 PROCEDURE — 97112 NEUROMUSCULAR REEDUCATION: CPT | Mod: PN

## 2020-01-09 PROCEDURE — 97110 THERAPEUTIC EXERCISES: CPT | Mod: PN

## 2020-01-09 NOTE — PROGRESS NOTES
Physical Therapy Daily Treatment Note      Name: Octavia Arrington  Clinic Number: [id number]     Therapy Diagnosis:    Decreased ROM of neck      Decreased strength      Vestibulopathy, unspecified laterality           Physician: Gerard Cavazos III, MD     Visit Date: 12/30/2019      Physician Orders: PT Eval and Treat, therapeutic exercise passive, active resistive, cervical traction  Medical Diagnosis from Referral:   S06.0X0D (ICD-10-CM) - Concussion without loss of consciousness, subsequent encounter   S13.4XXA (ICD-10-CM) - Whiplash, initial encounter   H81.90 (ICD-10-CM) - Vestibulopathy, unspecified laterality   F07.81 (ICD-10-CM) - Post-concussion syndrome   R51 (ICD-10-CM) - Cervicogenic headache   M54.81 (ICD-10-CM) - Bilateral occipital neuralgia      Authorization Period Expiration: 10/10/2020  Plan of Care Expiration: 2/4/2020  Visit # / Visits authorized: 7/ 12     Time In: 4:45pm  Time Out: 5:32pm  Total Billable Time: 47 minutes     Precautions: Standard        Subjective      Pt reports: States she is tired today due to waking up early for work. Has had headaches since prior treatment but not directly after.  Pt was complaint with HEP for saccades mostly, denies pencil push ups  Progress towards goals: ongoing, increased tolerance to oculomotor tasks     Pain: reports R foot pain on plantar surface and achilles region  Location:  n/a  Objective      Octavia participated in neuromuscular re-education activities to improve: Balance, Coordination, Kinesthetic, Sense, Proprioception, Posture and oculomotor performance for 39 minutes. The following activities were included:     Oculomotor performance:   Smooth Pursuits: without prism glasses due to pt forgot them today  Reading colored words on card:  2 x 30 seconds horizontal, no increased dizziness, slight visual slippage, jerkiness of eyes noted- reading word, then reading color  2 x 30 seconds vertical, no increased dizziness, slight visual slippage,  jerkiness of eyes noted- reading word, then reading color  Reading numbers on card:  +2x30 seconds horizontal, no increased dizziness, or visual slippage  +2x30 seconds horizontal adding numbers in columns, no increased dizziness  Card with shapes:   2 x 30 seconds  LUQ>RLQ, no increased dizziness, slight slippage noted- reading color of shape, then shape  2 x 30 seconds LLQ>RUQ, no increased dizziness, slight slippage noted-  eading color of shape, then shape      Saccades: horizontal and vertical with targets arm's length away and shoulder width apart  1 x 30 seconds at 100 bpm, no visual slippage, no increased dizziness, appropriately challenging     VOR1:   2 X 30 seconds at 100 bpm, no increased dizziness, no slippage noted, appropr   + x 2 laps with dynamic background, no increased dizziness, no slippage noted  Vertical ball toss to self while walking x 50 ft front/ 50 ft back- fair balance  Ball toss hand to hand while walking x 50 ft/ 50 ft back- fair balance  Around the world (circles ball in place) 5x- increase in dizziness     // bars:   Tandem stance 2 x 30 sec= poor+ to fair- balance  Static standing on Bosu 2 x 30 sec= poor to poor+     Octavia received therapeutic exercises to develop intrinsic and ankle strength for 8 minutes including:    Reviewed exercises to address foot pain:   Ankle 4 way using RTB x 10 each  Towel curls  Heel raises     Home Exercises Provided and Patient Education Provided      Education provided:   - continue with HEP  - provided with stretches to address low back pain       Written Home Exercises Provided: continue with previous HEP + added stretches for low back pain   Exercises were reviewed and Octavia was able to demonstrate them prior to the end of the session.  Octavia demonstrated good  understanding of the education provided.      See EMR under Patient Instructions for exercises provided 6/28/2019. And 12/30/2019     Assessment   Octavia tolerates PT sessions well. Pt  reports R sided achilles and plantar surface foot pain, no GERARDO reported. Increased pain in weight bearing position, educated to wear supportive shoes and continue with HEP given today to address weaknesses. Pt reports occasional headaches since previous treatment session. Performed saccades without prism glasses today due to pt forgetting to bring them to treatment. Pt is continuing to demonstrate good quality of saccades and can tolerate at least 100 bpm. No noted nystagmus throughout treatment session today. Slight LOB with self recover with addition of VOR x 1 with gait in dynamic setting. Pt can benefit from continued skilled PT to address remaining impairments to return to prior level of function (tolerating daily activities without a significant increase in headaches).       Octavia is progressing well towards her goals.   Pt prognosis is Good.      Pt will continue to benefit from skilled outpatient physical therapy to address the deficits listed in the problem list box on initial evaluation, provide pt/family education and to maximize pt's level of independence in the home and community environment.      Pt's spiritual, cultural and educational needs considered and pt agreeable to plan of care and goals.     Anticipated barriers to physical therapy: attendance to treatment, compliance with HEP        Goals:  Formal status as of 12/30/2019  Short Term Goals: 6 weeks   1. Pt will report compliance with HEP to improve carry over. Ongoing- good compliance reported  2. Pt will be able to perform saccades at 75 bpm without excessive blinking at blank background x 10. Met 12/2/2019- 85 bpm  3. Pt will be able to balance with narrow base of support and eyes closed for 30 seconds without LOB. Met 12/2/19  4. Pt will improve convergence to </= 17 cm to improve oculomotor control. met 12/2/19- 23 cm     Long Term Goals: 12 weeks   1. Pt will report ability to read for 10 minutes without vision changes. Improved- 5-7  minutes  2. Pt will perform 10 reps of smooth tracking without nystagmus at end range horizontal motion. Ongoing- ~3 repetitions before nystagmus noted endrange L eye  3. Pt will be able to perform saccades at 90 bpm in dynamic background without excessive blinking or visual strain. Met 12/30/19  4. Pt will improve convergence to </= 10 cm to improve oculomotor control. Inconsistent- 28 cm  5. Pt will improve cervical SB bilaterally by 10 degrees without pain to improve AROM and return to functional activities without increase in symptoms. ongoing      Plan         Address VOR activities and smooth pursuits with increased difficulty. May delete saccade practice in session. continue with balance with decreased visual support on unstable surfaces, continue with use of Bosu and progress as tolerated     Lani Munoz, PT, DPT

## 2020-01-16 ENCOUNTER — CLINICAL SUPPORT (OUTPATIENT)
Dept: REHABILITATION | Facility: HOSPITAL | Age: 60
End: 2020-01-16
Payer: OTHER MISCELLANEOUS

## 2020-01-16 DIAGNOSIS — R53.1 DECREASED STRENGTH: ICD-10-CM

## 2020-01-16 DIAGNOSIS — H81.90 VESTIBULOPATHY, UNSPECIFIED LATERALITY: ICD-10-CM

## 2020-01-16 DIAGNOSIS — R29.898 DECREASED ROM OF NECK: ICD-10-CM

## 2020-01-16 PROCEDURE — 97112 NEUROMUSCULAR REEDUCATION: CPT | Mod: PN

## 2020-01-16 NOTE — PROGRESS NOTES
"  Physical Therapy Daily Treatment Note      Name: Octavia Arrington  Clinic Number: [id number]     Therapy Diagnosis:    Decreased ROM of neck      Decreased strength      Vestibulopathy, unspecified laterality           Physician: Gerard Cavazos III, MD     Visit Date: 12/30/2019      Physician Orders: PT Eval and Treat, therapeutic exercise passive, active resistive, cervical traction  Medical Diagnosis from Referral:   S06.0X0D (ICD-10-CM) - Concussion without loss of consciousness, subsequent encounter   S13.4XXA (ICD-10-CM) - Whiplash, initial encounter   H81.90 (ICD-10-CM) - Vestibulopathy, unspecified laterality   F07.81 (ICD-10-CM) - Post-concussion syndrome   R51 (ICD-10-CM) - Cervicogenic headache   M54.81 (ICD-10-CM) - Bilateral occipital neuralgia      Authorization Period Expiration: 10/10/2020  Plan of Care Expiration: 2/4/2020  Visit # / Visits authorized: 8/ 12     Time In: 4:45pm  Time Out: 5:40pm  Total Billable Time: 45 minutes     Precautions: Standard        Subjective      Pt reports: States she continues to have frequent headaches throughout the week. Increase in headache during increasingly difficult vestibular exercises  Pt was complaint with HEP for saccades. Educated to include static and dynamic balance and VOR exercises.   Progress towards goals: ongoing, increased tolerance to oculomotor tasks     Pain: no achilles or back pain reported today initially, increased back pain with standing activities during treatment but did not rate pain  Location:  n/a  Objective      Octavia participated in neuromuscular re-education activities to improve: Balance, Coordination, Kinesthetic, Sense, Proprioception, Posture and oculomotor performance for 45 minutes. The following activities were included:     UT stretches 3 x 30" B  LS stretch 3 x 30" B     VOR1:   2 X 30 seconds at 100 bpm, no increased dizziness, no slippage noted, appropr  x 3 laps with dynamic background, no increased dizziness, no " "slippage noted    Vertical ball toss to self while walking x 50 ft front/ 50 ft back- fair balance  Ball toss hand to hand while walking x 50 ft/ 50 ft back- fair balance  +gait forward x 2 laps with eyes closed- pt walks towards L side  +gait backwards, eyes open x 2 lap  Around the world (circles ball in place) 5x- increase in dizziness     // bars:   Tandem stance 2 x 30 sec= poor+ to fair- balance  +tandem on airex 2 x 15"- poor+ balance  Static standing on Bosu 2 x 30 sec= poor to poor+-NP  +Tandem walking x 4 laps forward/backwards   +NBOS with horizontal and vertical head turns 2 x 30" each  +NBOS on airex 2 x 30"    Octavia received cervical hot pack for 10 minutes post treatment session.    Home Exercises Provided and Patient Education Provided      Education provided:   - continue with HEP  - provided with stretches to address low back pain       Written Home Exercises Provided: continue with previous HEP + added stretches for low back pain   Exercises were reviewed and Octavia was able to demonstrate them prior to the end of the session.  Octavia demonstrated good  understanding of the education provided.      See EMR under Patient Instructions for exercises provided 6/28/2019. And 12/30/2019     Assessment   Octavia tolerates PT sessions fair. Pt reports frequent headaches throughout week. Progression of VOR activities in dynamic setting without increased dizziness reported. Addition of vestibular exercises with increased dizziness and occasional tension on top of head. Frequently ambulated towards left side without visual feedback (eyes closed). Pt states she is not consistent with HEP due to long hours at work and being fatigued when she gets home. Pt can benefit from continued skilled PT to address remaining impairments to return to prior level of function (tolerating daily activities without a significant increase in headaches).       Octavia is progressing well towards her goals.   Pt prognosis is Good. "      Pt will continue to benefit from skilled outpatient physical therapy to address the deficits listed in the problem list box on initial evaluation, provide pt/family education and to maximize pt's level of independence in the home and community environment.      Pt's spiritual, cultural and educational needs considered and pt agreeable to plan of care and goals.     Anticipated barriers to physical therapy: attendance to treatment, compliance with HEP        Goals:  Formal status as of 12/30/2019  Short Term Goals: 6 weeks   1. Pt will report compliance with HEP to improve carry over. Ongoing- good compliance reported  2. Pt will be able to perform saccades at 75 bpm without excessive blinking at blank background x 10. Met 12/2/2019- 85 bpm  3. Pt will be able to balance with narrow base of support and eyes closed for 30 seconds without LOB. Met 12/2/19  4. Pt will improve convergence to </= 17 cm to improve oculomotor control. met 12/2/19- 23 cm     Long Term Goals: 12 weeks   1. Pt will report ability to read for 10 minutes without vision changes. Improved- 5-7 minutes  2. Pt will perform 10 reps of smooth tracking without nystagmus at end range horizontal motion. Ongoing- ~3 repetitions before nystagmus noted endrange L eye  3. Pt will be able to perform saccades at 90 bpm in dynamic background without excessive blinking or visual strain. Met 12/30/19  4. Pt will improve convergence to </= 10 cm to improve oculomotor control. Inconsistent- 28 cm  5. Pt will improve cervical SB bilaterally by 10 degrees without pain to improve AROM and return to functional activities without increase in symptoms. ongoing      Plan         Address VOR activities and smooth pursuits with increased difficulty. May delete saccade practice in session. continue with balance with decreased visual support on unstable surfaces, continue with use of Bosu and progress as tolerated     Lani Munoz, PT, DPT

## 2020-01-21 ENCOUNTER — DOCUMENTATION ONLY (OUTPATIENT)
Dept: REHABILITATION | Facility: HOSPITAL | Age: 60
End: 2020-01-21

## 2020-01-21 NOTE — PROGRESS NOTES
Missed Visit/Cancellation      Date:1/21/2020         Canceled Number: 0  No Show Number: 1                                                                                                                Pt initially had visit scheduled for today for 1600.   Reason for cancellation: no show.    Pt's next scheduled physical therapy visit is 1/28/2020.    Merari Dougherty,DPT  1/21/2020

## 2020-01-28 ENCOUNTER — CLINICAL SUPPORT (OUTPATIENT)
Dept: REHABILITATION | Facility: HOSPITAL | Age: 60
End: 2020-01-28
Payer: OTHER MISCELLANEOUS

## 2020-01-28 DIAGNOSIS — R53.1 DECREASED STRENGTH: ICD-10-CM

## 2020-01-28 DIAGNOSIS — R29.898 DECREASED ROM OF NECK: ICD-10-CM

## 2020-01-28 DIAGNOSIS — H81.90 VESTIBULOPATHY, UNSPECIFIED LATERALITY: ICD-10-CM

## 2020-01-28 PROCEDURE — 97112 NEUROMUSCULAR REEDUCATION: CPT | Mod: PN | Performed by: PHYSICAL THERAPIST

## 2020-01-28 PROCEDURE — 97110 THERAPEUTIC EXERCISES: CPT | Mod: PN | Performed by: PHYSICAL THERAPIST

## 2020-01-28 NOTE — PATIENT INSTRUCTIONS
Scapular Retraction: Bilateral        Facing anchor, pull arms back, bringing shoulder blades together.  Repeat 10 times per set. Do 2 sets per session. Do 1 sessions per day.     [email or web address]     Copyright © [employer]. All rights reserved.   EXTENSION: Standing - Resistance Band: Unstable (Active)        Stand both arms at side. Against green resistance band, draw arm backward, as far as possible, keeping elbow straight.  Complete 2 sets of 10 repetitions. Perform 1 sessions per day.    Copyright © [employer]. All rights reserved.     Copyright © 3481-3021 [employer]

## 2020-02-03 NOTE — PLAN OF CARE
Physical Therapy Daily Treatment Note      Name: Octavia Arrington  Clinic Number: [id number]     Therapy Diagnosis:    Decreased ROM of neck      Decreased strength      Vestibulopathy, unspecified laterality           Physician: Gerard Cavazos III, MD     Visit Date: 1/28/2020      Physician Orders: PT Eval and Treat, therapeutic exercise passive, active resistive, cervical traction  Medical Diagnosis from Referral:   S06.0X0D (ICD-10-CM) - Concussion without loss of consciousness, subsequent encounter   S13.4XXA (ICD-10-CM) - Whiplash, initial encounter   H81.90 (ICD-10-CM) - Vestibulopathy, unspecified laterality   F07.81 (ICD-10-CM) - Post-concussion syndrome   R51 (ICD-10-CM) - Cervicogenic headache   M54.81 (ICD-10-CM) - Bilateral occipital neuralgia      Authorization Period Expiration: 10/10/2020  Plan of Care Expiration: 2/4/2020  New POC: 2/5/2020 to 3/6/2020  Visit # / Visits authorized: 9/ 12     Time In: 1605  Time Out: 1655  Total Billable Time: 40 minutes     Precautions: Standard        Subjective      Pt reports: she continues to have minor but nagging headaches. Pt denies cervical pain  Pt was partially complaint with HEP.    Progress towards goals: progressing     Pain: reports R sided facial pain and headache over this weekend, not present today. Headaches 3-4 days/ week 3/10 the max intensity  Location:  see above  Objective      Octavia participated in neuromuscular re-education activities to improve: Balance, Coordination, Kinesthetic, Sense, Proprioception, Posture and oculomotor performance for 10 minutes. The following activities were included:       Assessment of nystagmus via horizontal pursuits- minimal L eye at end range for L abduction    Convergence point= 23cm (abnormal)    // bars:   Static standing on Bosu 2 x 30 sec= fair- balance  Foam, romberg, eyes open, horizontal head turns 2 x 30 sec= fair- to fair balance  Foam, romberg, eyes open, vertical head turns 2 x 30 sec= fair-  balance  Foam, romberg, eyes closed 2 x 30 sec= fair- to fair balance    Octavia received therapeutic exercises to develop strength, endurance, flexibility and posture for 30 minutes including:    Cervical rotation AROM: R= 20 deg, L= 25 deg   standing: rowing Green thera band 2 x 10   Shoulder extension Green thera band 2 x 10   Scapular stars Yellow thera band 10x   Lower trap strength via wall slide 10x        Home Exercises Provided and Patient Education Provided      Education provided:   - continue with HEP for pencil push ups  - issued new HEP to address forward head posture to decrease headache occurrence      Written Home Exercises Provided: continue with previous HEP + added exercises for posture .   Exercises were reviewed and Octavia was able to demonstrate them prior to the end of the session.  Octavia demonstrated good  understanding of the education provided.      See EMR under Patient Instructions for exercises provided 6/28/2019, 12/30/2019, 1/28/2020     Assessment     Assessment period: 1/9/2020 to 1/28/2020   Octavia tolerates PT sessions well. Pt continues to demonstrate improvements in oculomotor function. Saccade speed and quality are ~normal. Minute L nystagmus is noted at end rage of L eye abduction. Pt reports improved ability to read without significant increase in headache or eye fatigue. Convergence point improved as compared to last assessment but is still abnormal at 23 cm (normal= 10 cm). Pt denies specific practice of this skill with performance of HEP. Pt continues to report headaches ~3-4 days/week. PT issued HEP for cervical and scapular strengthening to improve forward head/ rounded shoulders posture in attempt to decrease the frequency of headaches. Pt's continued headaches are likely post traumatic but influenced by continued oculomotor dysfunction and posture. Pt can benefit from continued skilled PT to address vestibular function and posture to decrease headache occurrence to  return to prior level of function.     Octavia is progressing well towards her goals.   Pt prognosis is Good.      Pt will continue to benefit from skilled outpatient physical therapy to address the deficits listed in the problem list box on initial evaluation, provide pt/family education and to maximize pt's level of independence in the home and community environment.      Pt's spiritual, cultural and educational needs considered and pt agreeable to plan of care and goals.     Anticipated barriers to physical therapy: compliance with HEP        Goals:   Formal status as of 1/28/2020  Short Term Goals: 6 weeks   1. Pt will report compliance with HEP to improve carry over. Ongoing- good compliance reported  2. Pt will be able to perform saccades at 75 bpm without excessive blinking at blank background x 10. Met 12/2/2019- 85 bpm  3. Pt will be able to balance with narrow base of support and eyes closed for 30 seconds without LOB. Met 12/2/19  4. Pt will improve convergence to </= 17 cm to improve oculomotor control. met 12/2/19- 23 cm     Long Term Goals: 12 weeks   1. Pt will report ability to read for 10 minutes without vision changes. Met- 1/28/2020  2. Pt will perform 10 reps of smooth tracking without nystagmus at end range horizontal motion. improved- minute nystagmus in L eye at endrange  3. Pt will be able to perform saccades at 90 bpm in dynamic background without excessive blinking or visual strain. Met 12/30/19  4. Pt will improve convergence to </= 10 cm to improve oculomotor control. Improved compared to last assessment- 23 cm  5. Pt will improve cervical SB bilaterally by 10 degrees without pain to improve AROM and return to functional activities without increase in symptoms. Ongoing-  R= 20 deg, L= 25 deg     Plan      New POC: 2/5/2020 to 3/6/2020 to include therapeutic exercises, neuromuscular re education, pt education    Continue with balance on unstable surfaces with decreased visual support.   review scapular strengthening to ensure compliance with HEP. May progress scapular strengthening as needed.     Merari Dougherty,PT, DPT

## 2020-02-06 ENCOUNTER — CLINICAL SUPPORT (OUTPATIENT)
Dept: REHABILITATION | Facility: HOSPITAL | Age: 60
End: 2020-02-06
Payer: OTHER MISCELLANEOUS

## 2020-02-06 DIAGNOSIS — R53.1 DECREASED STRENGTH: ICD-10-CM

## 2020-02-06 DIAGNOSIS — R29.898 DECREASED ROM OF NECK: ICD-10-CM

## 2020-02-06 DIAGNOSIS — H81.90 VESTIBULOPATHY, UNSPECIFIED LATERALITY: ICD-10-CM

## 2020-02-06 PROCEDURE — 97112 NEUROMUSCULAR REEDUCATION: CPT | Mod: PN

## 2020-02-06 PROCEDURE — 97110 THERAPEUTIC EXERCISES: CPT | Mod: PN

## 2020-02-06 NOTE — PROGRESS NOTES
Physical Therapy Daily Treatment Note      Name: Octavia Arrington  Clinic Number: [id number]     Therapy Diagnosis:    Decreased ROM of neck      Decreased strength      Vestibulopathy, unspecified laterality           Physician: Gerard Cavazos III, MD     Visit Date: 12/30/2019      Physician Orders: PT Eval and Treat, therapeutic exercise passive, active resistive, cervical traction  Medical Diagnosis from Referral:   S06.0X0D (ICD-10-CM) - Concussion without loss of consciousness, subsequent encounter   S13.4XXA (ICD-10-CM) - Whiplash, initial encounter   H81.90 (ICD-10-CM) - Vestibulopathy, unspecified laterality   F07.81 (ICD-10-CM) - Post-concussion syndrome   R51 (ICD-10-CM) - Cervicogenic headache   M54.81 (ICD-10-CM) - Bilateral occipital neuralgia      Authorization Period Expiration: 10/10/2020  Plan of Care Expiration: 2/4/2020  Visit # / Visits authorized: 10/ 12     Time In: 3:22pm  Time Out: 4:05pm  Total Billable Time: 43 minutes     Precautions: Standard        Subjective      Pt reports: States she continues to have minor headaches a few times per week, no longer having daily headaches. No significant cervical pain reported today     Pain: denies headache upon arrival but reports occasional headaches a few times a week.   Location:  n/a  Objective      Octavia participated in neuromuscular re-education activities to improve: Balance, Coordination, Kinesthetic, Sense, Proprioception, Posture and oculomotor performance for 13 minutes. The following activities were included:     Forward walking with horizontal and vertical head movements x 2 laps each  Backward walking x 2 laps     // bars:   Static standing on Bosu 2 x 30 sec= fair- balance  Foam, romberg, eyes open, horizontal head turns 2 x 30 sec= fair- to fair balance  Foam, romberg, eyes open, vertical head turns 2 x 30 sec= fair- balance  Foam, romberg, eyes closed 2 x 30 sec= fair- to fair balance    Octavia received therapeutic exercises to  develop strength, endurance, flexibility and posture for 30 minutes including:        standing: rowing Green thera band 3 x 10              Shoulder extension Green thera band 3 x 10              Scapular stars Yellow thera band 10x each              Lower trap strength via wall slide with lift off x 10   +no moneys RTB 2x 10   +wall push ups x 12    Octavia received cervical hot pack for 00 minutes post treatment session. Pt deferred.     Home Exercises Provided and Patient Education Provided      Education provided:   - continue with HEP  - provided with stretches to address low back pain       Written Home Exercises Provided: continue with previous HEP + added stretches for low back pain   Exercises were reviewed and Octavia was able to demonstrate them prior to the end of the session.  Octavia demonstrated good  understanding of the education provided.      See EMR under Patient Instructions for exercises provided 6/28/2019. And 12/30/2019     Assessment   Octavia tolerates PT sessions well today. Focused on progression of static and dynamic balance and postural strength and endurance to limit intensity and frequency of headaches throughout the week. Continuation of vestibular activities during gait without dizziness reported or LOB. Pt reports inconsistency with performance of home exercise program due to fatigue after work. Discussed importance of continuation of oculomotor exercises at home to continue to progress with function and decrease headaches.       Octavia is progressing well towards her goals.   Pt prognosis is Good.      Pt will continue to benefit from skilled outpatient physical therapy to address the deficits listed in the problem list box on initial evaluation, provide pt/family education and to maximize pt's level of independence in the home and community environment.      Pt's spiritual, cultural and educational needs considered and pt agreeable to plan of care and goals.     Anticipated barriers  to physical therapy: attendance to treatment, compliance with HEP        Goals:  Formal status as of 12/30/2019  Short Term Goals: 6 weeks   1. Pt will report compliance with HEP to improve carry over. Ongoing- good compliance reported  2. Pt will be able to perform saccades at 75 bpm without excessive blinking at blank background x 10. Met 12/2/2019- 85 bpm  3. Pt will be able to balance with narrow base of support and eyes closed for 30 seconds without LOB. Met 12/2/19  4. Pt will improve convergence to </= 17 cm to improve oculomotor control. met 12/2/19- 23 cm     Long Term Goals: 12 weeks   1. Pt will report ability to read for 10 minutes without vision changes. Improved- 5-7 minutes  2. Pt will perform 10 reps of smooth tracking without nystagmus at end range horizontal motion. Ongoing- ~3 repetitions before nystagmus noted endrange L eye  3. Pt will be able to perform saccades at 90 bpm in dynamic background without excessive blinking or visual strain. Met 12/30/19  4. Pt will improve convergence to </= 10 cm to improve oculomotor control. Inconsistent- 28 cm  5. Pt will improve cervical SB bilaterally by 10 degrees without pain to improve AROM and return to functional activities without increase in symptoms. ongoing      Plan      Continue to progress balance on variable surfaces with decreased visual support.      Lani Munoz, PT, DPT

## 2020-02-13 ENCOUNTER — CLINICAL SUPPORT (OUTPATIENT)
Dept: REHABILITATION | Facility: HOSPITAL | Age: 60
End: 2020-02-13
Payer: OTHER MISCELLANEOUS

## 2020-02-13 DIAGNOSIS — R29.898 DECREASED ROM OF NECK: ICD-10-CM

## 2020-02-13 DIAGNOSIS — R53.1 DECREASED STRENGTH: ICD-10-CM

## 2020-02-13 DIAGNOSIS — H81.90 VESTIBULOPATHY, UNSPECIFIED LATERALITY: ICD-10-CM

## 2020-02-13 PROCEDURE — 97112 NEUROMUSCULAR REEDUCATION: CPT | Mod: PN

## 2020-02-13 PROCEDURE — 97110 THERAPEUTIC EXERCISES: CPT | Mod: PN

## 2020-02-13 NOTE — PROGRESS NOTES
Physical Therapy Daily Treatment Note      Name: Octavia Arrington  Clinic Number: [id number]     Therapy Diagnosis:    Decreased ROM of neck      Decreased strength      Vestibulopathy, unspecified laterality           Physician: Gerard Cavazos III, MD     Visit Date: 12/30/2019      Physician Orders: PT Eval and Treat, therapeutic exercise passive, active resistive, cervical traction  Medical Diagnosis from Referral:   S06.0X0D (ICD-10-CM) - Concussion without loss of consciousness, subsequent encounter   S13.4XXA (ICD-10-CM) - Whiplash, initial encounter   H81.90 (ICD-10-CM) - Vestibulopathy, unspecified laterality   F07.81 (ICD-10-CM) - Post-concussion syndrome   R51 (ICD-10-CM) - Cervicogenic headache   M54.81 (ICD-10-CM) - Bilateral occipital neuralgia      Authorization Period Expiration: 10/10/2020  Plan of Care Expiration: 3/6/2020  Visit # / Visits authorized: 11/ 12     Time In: 3:22 pm  Time Out: 4:10pm  Total Billable Time: 38 minutes     Precautions: Standard        Subjective      Pt reports: States Monday and Tuesday she had a pretty significant headache at work. Headaches continue to be on top of head and occasionally in frontal lobe.      Pain: denies headache upon arrival but reports occasional headaches a few times a week.   Location:  n/a  Objective      Octavia participated in neuromuscular re-education activities to improve: Balance, Coordination, Kinesthetic, Sense, Proprioception, Posture and oculomotor performance for 13 minutes. The following activities were included:     Forward walking with horizontal and vertical head movements x 2 laps each  Backward walking x 2 laps     // bars:   Static standing on Bosu 2 x 30 sec= fair- balance  Foam, romberg, eyes open, horizontal head turns 2 x 30 sec= fair- to fair balance  Foam, romberg, eyes open, vertical head turns 2 x 30 sec= fair- balance  Foam, romberg, eyes closed 2 x 30 sec= fair- to fair balance  +Step up onto airex x 20    Octavia received  therapeutic exercises to develop strength, endurance, flexibility and posture for 25 minutes including:      standing: rowing Green thera band 3 x 10              Shoulder extension Green thera band 3 x 10              Scapular stars Yellow thera band 15x each              Lower trap strength via wall slide with lift off x 10   no moneys RTB 3x 10   wall push ups 2 x 10     Octavia received cervical hot pack for 00 minutes post treatment session. Pt deferred.     Home Exercises Provided and Patient Education Provided      Education provided:   - continue with HEP  - provided with stretches to address low back pain       Written Home Exercises Provided: continue with previous HEP + added stretches for low back pain   Exercises were reviewed and Octavia was able to demonstrate them prior to the end of the session.  Octavia demonstrated good  understanding of the education provided.      See EMR under Patient Instructions for exercises provided 6/28/2019. And 12/30/2019     Assessment   Octavia tolerates PT sessions well today. Continued on progression of static and dynamic balance and postural strength and endurance. Minimal dizziness and increase in headaches while performing activities requiring vertical head movements. Pt reports inconsistency with performance of home exercise program due to fatigue after work. Discussed importance of continuation of oculomotor exercises at home to continue to progress with function and decrease headaches.       Octavia is progressing well towards her goals.   Pt prognosis is Good.      Pt will continue to benefit from skilled outpatient physical therapy to address the deficits listed in the problem list box on initial evaluation, provide pt/family education and to maximize pt's level of independence in the home and community environment.      Pt's spiritual, cultural and educational needs considered and pt agreeable to plan of care and goals.     Anticipated barriers to physical  therapy: attendance to treatment, compliance with HEP        Goals:  Formal status as of 12/30/2019  Short Term Goals: 6 weeks   1. Pt will report compliance with HEP to improve carry over. Ongoing- good compliance reported  2. Pt will be able to perform saccades at 75 bpm without excessive blinking at blank background x 10. Met 12/2/2019- 85 bpm  3. Pt will be able to balance with narrow base of support and eyes closed for 30 seconds without LOB. Met 12/2/19  4. Pt will improve convergence to </= 17 cm to improve oculomotor control. met 12/2/19- 23 cm     Long Term Goals: 12 weeks   1. Pt will report ability to read for 10 minutes without vision changes. Improved- 5-7 minutes  2. Pt will perform 10 reps of smooth tracking without nystagmus at end range horizontal motion. Ongoing- ~3 repetitions before nystagmus noted endrange L eye  3. Pt will be able to perform saccades at 90 bpm in dynamic background without excessive blinking or visual strain. Met 12/30/19  4. Pt will improve convergence to </= 10 cm to improve oculomotor control. Inconsistent- 28 cm  5. Pt will improve cervical SB bilaterally by 10 degrees without pain to improve AROM and return to functional activities without increase in symptoms. ongoing      Plan      Continue to progress balance on variable surfaces with decreased visual support.      Lani Munoz, PT, DPT

## 2020-02-18 ENCOUNTER — CLINICAL SUPPORT (OUTPATIENT)
Dept: REHABILITATION | Facility: HOSPITAL | Age: 60
End: 2020-02-18
Payer: OTHER MISCELLANEOUS

## 2020-02-18 DIAGNOSIS — H81.90 VESTIBULOPATHY, UNSPECIFIED LATERALITY: ICD-10-CM

## 2020-02-18 DIAGNOSIS — R53.1 DECREASED STRENGTH: ICD-10-CM

## 2020-02-18 DIAGNOSIS — R29.898 DECREASED ROM OF NECK: ICD-10-CM

## 2020-02-18 PROCEDURE — 97112 NEUROMUSCULAR REEDUCATION: CPT | Mod: PN | Performed by: PHYSICAL THERAPIST

## 2020-02-18 PROCEDURE — 97110 THERAPEUTIC EXERCISES: CPT | Mod: PN | Performed by: PHYSICAL THERAPIST

## 2020-02-27 ENCOUNTER — CLINICAL SUPPORT (OUTPATIENT)
Dept: REHABILITATION | Facility: HOSPITAL | Age: 60
End: 2020-02-27
Payer: OTHER MISCELLANEOUS

## 2020-02-27 DIAGNOSIS — H81.90 VESTIBULOPATHY, UNSPECIFIED LATERALITY: ICD-10-CM

## 2020-02-27 DIAGNOSIS — R53.1 DECREASED STRENGTH: ICD-10-CM

## 2020-02-27 DIAGNOSIS — R29.898 DECREASED ROM OF NECK: ICD-10-CM

## 2020-02-27 PROCEDURE — 97110 THERAPEUTIC EXERCISES: CPT | Mod: PN

## 2020-02-27 NOTE — PROGRESS NOTES
"  Physical Therapy Daily Treatment Note      Name: Octavia Arrington  Clinic Number: [id number]     Therapy Diagnosis:    Decreased ROM of neck      Decreased strength      Vestibulopathy, unspecified laterality           Physician: Gerard Cavazos III, MD     Visit Date: 12/30/2019      Physician Orders: PT Eval and Treat, therapeutic exercise passive, active resistive, cervical traction  Medical Diagnosis from Referral:   S06.0X0D (ICD-10-CM) - Concussion without loss of consciousness, subsequent encounter   S13.4XXA (ICD-10-CM) - Whiplash, initial encounter   H81.90 (ICD-10-CM) - Vestibulopathy, unspecified laterality   F07.81 (ICD-10-CM) - Post-concussion syndrome   R51 (ICD-10-CM) - Cervicogenic headache   M54.81 (ICD-10-CM) - Bilateral occipital neuralgia      Authorization Period Expiration: 10/10/2020  Plan of Care Expiration: 3/6/2020  Visit # / Visits authorized: 1/5 (visit total: 13)     Time In: 3:27pm  Time Out: 4:10pm  Total Billable Time: 33 minutes     Precautions: Standard        Subjective      Pt reports: occasional ice pick headaches but not consistent, daily headaches pt previously had. Found prism glasses and is wearing them upon arrival to treatment.   HEP: pt report good compliance with HEP  Tolerance to last PT session: no adverse effects reported  Functional change: improved convergence point, decreased headache occurrence     Pain: denies headache upon arrival but reports "weird sensation" at apex of head during session (towards end of session)- pt denied this as being painful  Location:  n/a  Objective      Octavia participated in neuromuscular re-education activities to improve: Balance, Coordination, Kinesthetic, Sense, Proprioception, Posture and oculomotor performance for 00 minutes. The following activities were included:       Convergence point= 19 cm (2/18)    Functional Gait Assessment: (2/18)  1. Gait on level surface =  2, 5.9 sec   (3) Normal: less than 5.5 sec, no A.D., no imbalance, " normal gait pattern, deviates< 6in   (2) Mild impairment: 7-5.6 sec, uses A.D., mild gait deviations, or deviates 6-10 in   (1) Moderate impairment: > 7 sec, slow speed, imbalance, deviates 10-15 in.   (0) Severe impairment: needs assist, deviates >15 in, reach/touch wall  2. Change in Gait Speed = 3   (3) Normal: smooth change w/o loss of balance or gait deviation, deviates < 6 in, significant difference between speeds   (2) Mild impairment: changes speed, but demonstrates mild gait deviations, deviates 6-10 in, OR no deviations but unable to significantly speed, OR uses A.D.   (1) Moderate impairment: minor changes to speed, OR changes speed w/ significant deviations, deviates 10-15 in, OR  Changes speed , but loses balance & recovers   (0) Severe impairment: cannot change speed, deviates >15 in, or loses balance & needs assist  3. Gait with horizontal head turns  = 3 (woozy)   (3) Normal: no change in gait, deviates <6 in   (2) Mild impairment: slight change in speed, deviates 6-10 in, OR uses A.D.   (1) Moderate impairment: moderate change in speed, deviates 10-15 in   (0) Severe impairment: severe disruption of gait, deviates >15in  4. Gait with vertical head turns = 3 (woozy)   (3) Normal: no change in gait, deviates <6 in   (2) Mild impairment: slight change in speed, deviates 6-10 in OR uses A.D.   (1) Moderate impairment: moderate change in speed, deviates 10-15 in   (0) Severe impairment: severe disruption of gait, deviates >15 in  5. Gait with pivot turns = 2   (3) Normal: performs safely in 3 sec, no LOB   (2) Mild impairment: performs in >3 sec & no LOB, OR turns safely & requires several steps to regain LOB   (1) Moderate impairment: turns slow, OR requires several small steps for balance following turn & stop   (0) Severe impairment: cannot turn safely, needs assist  6. Step over obstacle = 2   (3) Normal: steps over 2 stacked boxes w/o change in speed or LOB   (2) Mild impairment: able to step over 1  box w/o change in speed or LOB   (1) Moderate impairment: steps over 1 box but must slow down, may require VC   (0) Severe impairment: cannot perform w/o assist  7. Gait with Narrow MARIELA = 3   (3) Normal: 10 steps no staggering   (2) Mild impairment: 7-9 steps   (1) Moderate impairment: 4-7 steps   (0) Severe impairment: < 4 steps or cannot perform w/o assist  8. Gait with eyes closed = 0   (3) Normal: < 7 sec, no A.D., no LOB, normal gait pattern, deviates <6 in   (2) Mild impairment: 7.1-9 sec, mild gait deviations, deviates 6-10 in   (1) Moderate impairment: > 9 sec, abnormal pattern, LOB, deviates 10-15 in   (0) Severe impairment: cannot perform w/o assist, LOB, deviates >15in  9. Ambulating Backwards = 2   (3) Normal: no A.D., no LOB, normal gait pattern, deviates <6in   (2) Mild impairment: uses A.D., slower speed, mild gait deviations, deviates 6-10 in   (1) Moderate impairment: slow speed, abnormal gait pattern, LOB, deviates 10-15 in   (0) Severe impairment: severe gait deviations or LOB, deviates >15in  10. Steps = 2   (3) Normal: alternating feet, no rail   (2) Mild Impairment: alternating feet, uses rail   (1) Moderate impairment: step-to, uses rail   (0) Severe impairment: cannot perform safely  Score 22/30     Score:   <22/30 fall risk   <20/30 fall risk in older adults   <18/30 fall risk in Parkinsons     [facility] stepping test: 0 deg rotation, no hypofunction noted CLEMENT     // bars:   Static standing on Bosu (flat side up) 2 x 30 sec= fair to fair+ balance  Static standing on Bosu (flat side up) horizontal head turns x 30 sec= fair- balance  Static standing on Bosu (flat side up) vertical head turns 2 x 30 sec= fair- balance      Octavia received therapeutic exercises to develop strength, endurance, flexibility and posture for 33 minutes including:     Saccades: minimal nystagmus at end range with horizontal motion  Cervical SB: R= 37 deg, L= 42 deg    upper extremity ergometer, [place] interval training  "L4 x 6 min (3 front/ 3 back)     standing:               Lower trap strength via wall slide with lift off 2 x 10   no moneys Green thera band 2 x 15   Rows using GTB 2 x 15   Shoulder Ext using GTB 2 x 15   +horizontal abd YTB x 15   +PNF D2 YTB x 10 B    Side lying: open books 10x    Home Exercises Provided and Patient Education Provided      Education provided:   - continue with HEP, added open books  - continue with convergence exercise, it has improved since last reassessment.   -continue with stretches      Written Home Exercises Provided: continue with previous HEP, added open books  Exercises were reviewed and Octavia was able to demonstrate them prior to the end of the session.  Octavia demonstrated good  understanding of the education provided.      See EMR under Patient Instructions for exercises provided 6/28/2019. And 12/30/2019     Assessment periodAssessment     Assessment period: 1/28/2020 to 2/28/2020    Octavia tolerated today's PT session well. Pt reports she still has headaches and "dizziness" but this continues to improve. Pt indicates occasional ice pick headaches, but no longer reports frequent, daily headaches or cervical pain. Pt reports improved compliance with HEP for postural range of Motion and strengthening and pencil push ups. Testing done on 2/18/2020 with following assessment: "Pt demonstrates improved (but still abnormal) convergence point.  Functional Gait Assessment was performed with pt reporting "wooziness" during gait with head turns, but no significant deviation from straight path. Pt was not able to walk with eyes closed, veering to left of center each attempt. Pt had consistent loss of balance with pivot turns; this could be influced by history of TKAs. Overall, pt is not at risk for falls per this assessment." Pt with improved smooth pursuit noted by only minimal end range nystagmus with horizontal saccades. Pt cervical range of motion improvements from last assessment of " cervical side bend bilaterally, with some continued restrictions. Denies any pain during testing. With pt becoming more consistent with HEP, she has noticed a decrease in headaches and cervical pain. Pt can benefit from continuing therapy for ~3 more sessions to become as close to prior level as possible prior to d/c and ensure understanding of HEP.      Octavia is progressing well towards her goals.   Pt prognosis is Good.      Pt will continue to benefit from skilled outpatient physical therapy to address the deficits listed in the problem list box on initial evaluation, provide pt/family education and to maximize pt's level of independence in the home and community environment.      Pt's spiritual, cultural and educational needs considered and pt agreeable to plan of care and goals.     Anticipated barriers to physical therapy: attendance to treatment, compliance with HEP        Goals:  Formal status as of 2/27/2020  Short Term Goals: 6 weeks   1. Pt will report compliance with HEP to improve carry over. Ongoing- good compliance reported  2. Pt will be able to perform saccades at 75 bpm without excessive blinking at blank background x 10. Met 12/2/2019- 85 bpm  3. Pt will be able to balance with narrow base of support and eyes closed for 30 seconds without LOB. Met 12/2/19  4. Pt will improve convergence to </= 17 cm to improve oculomotor control. Improved- (2/18/20- 19 cm)     Long Term Goals: 12 weeks   1. Pt will report ability to read for 10 minutes without vision changes. Met- 1/28/2020  2. Pt will perform 10 reps of smooth tracking without nystagmus at end range horizontal motion. improved- minimal nystagmus in L eye at endrange  3. Pt will be able to perform saccades at 90 bpm in dynamic background without excessive blinking or visual strain. Met 12/30/19  4. Pt will improve convergence to </= 10 cm to improve oculomotor control. Improved compared to last assessment- 19 cm  5. Pt will improve cervical SB  bilaterally by 10 degrees without pain to improve AROM and return to functional activities without increase in symptoms. Ongoing-  R= 37 deg, L= 42 deg      Plan      Continue with balance on variable surfaces with decreased visual support. Perform cardiovascular activities (preferably UE) to decrease headaches. Ensure understanding of all HEP to prepare for discharge in next few visits.      Lani Munoz,PT, DPT

## 2020-03-03 ENCOUNTER — CLINICAL SUPPORT (OUTPATIENT)
Dept: REHABILITATION | Facility: HOSPITAL | Age: 60
End: 2020-03-03
Payer: OTHER MISCELLANEOUS

## 2020-03-03 DIAGNOSIS — H81.90 VESTIBULOPATHY, UNSPECIFIED LATERALITY: ICD-10-CM

## 2020-03-03 DIAGNOSIS — R53.1 DECREASED STRENGTH: ICD-10-CM

## 2020-03-03 DIAGNOSIS — R29.898 DECREASED ROM OF NECK: ICD-10-CM

## 2020-03-03 PROCEDURE — 97112 NEUROMUSCULAR REEDUCATION: CPT | Mod: PN | Performed by: PHYSICAL THERAPIST

## 2020-03-03 PROCEDURE — 97110 THERAPEUTIC EXERCISES: CPT | Mod: PN | Performed by: PHYSICAL THERAPIST

## 2020-03-03 NOTE — PROGRESS NOTES
Physical Therapy Daily Treatment Note      Name: Octavia Arrington  Clinic Number: [id number]     Therapy Diagnosis:    Decreased ROM of neck      Decreased strength      Vestibulopathy, unspecified laterality           Physician: Gerard Cavazos III, MD     Visit Date: 3/3/2020      Physician Orders: PT Eval and Treat, therapeutic exercise passive, active resistive, cervical traction  Medical Diagnosis from Referral:   S06.0X0D (ICD-10-CM) - Concussion without loss of consciousness, subsequent encounter   S13.4XXA (ICD-10-CM) - Whiplash, initial encounter   H81.90 (ICD-10-CM) - Vestibulopathy, unspecified laterality   F07.81 (ICD-10-CM) - Post-concussion syndrome   R51 (ICD-10-CM) - Cervicogenic headache   M54.81 (ICD-10-CM) - Bilateral occipital neuralgia      Authorization Period Expiration: 5/19/2020  Plan of Care Expiration: 3/6/2020  Visit # / Visits authorized: 11/12      Time In: 1550  Time Out: 1645  Total Billable Time: 55 minutes     Precautions: Standard        Subjective      Pt reports: Fatigue reported today due to not sleeping well.   HEP: pt report good compliance with HEP  Tolerance to last PT session: no adverse effects reported  Functional change: decreased headaches and dizziness      Pain: reports pain from possible plantar fascitis on L- not rated  Location:  L plantar fascia  Objective      Octavia participated in neuromuscular re-education activities to improve: Balance, Coordination, Kinesthetic, Sense, Proprioception, Posture and oculomotor performance for 15 minutes. The following activities were included:       // bars:   Static standing on Bosu (flat side up) x 30 sec= fair+ balance  Static standing on Bosu (flat side up) horizontal head turns 2 x 30 sec= fair balance  Static standing on Bosu (flat side up) vertical head turns 2 x 30 sec= fair balance  Romberg on foam, eyes closed 2 x 30 sec= fair- balance  Romberg on foam, eyes closed horizontal head turns x 30 sec= fair- balance  Romberg  on foam, eyes closed, vertical head turns x 30 sec= poor+ to fair- balance    Octavia received therapeutic exercises to develop strength, endurance, flexibility and posture for 40 minutes including:     upper extremity ergometer, hills interval training L4 x 8 min (front/back)     standing:               no moneys Green thera band 2 x 15   Rows using Blue thera band 2 x 15   Shoulder Ext using Green thera band 3 x 10   horizontal abd Red thera band  x 15   Serratus stars Green thera band 10x    Side lying: open books 10x    Home Exercises Provided and Patient Education Provided      Education provided:   - continue with HEP to manage symptoms  - d/c planning discussed, planned in 2 visits      Written Home Exercises Provided: continue with previous HEP  Exercises were reviewed and Octavia was able to demonstrate them prior to the end of the session.  Octavia demonstrated good  understanding of the education provided.      See EMR under Patient Instructions for exercises provided 6/28/2019. And 12/30/2019     Assessment periodAssessment     Octavia tolerated PT session well. Pt is reporting no significant episodes of dizziness or imbalance. Headaches have been manageable and decreasing since pt increased participation in HEP for oculomotor and postural deficits. Pt's standing balance on compliant surfaces continues to improve even with vision eliminated (demonstrating improved use of vestibular system). Resistance for postural exercises increased as tolerated. Pt can benefit from continued skilled PT to address remaining vestibular impairments and reinforce HEP/ advance HEP as needed prior to d/c.         Octavia is progressing well towards her goals.   Pt prognosis is Good.      Pt will continue to benefit from skilled outpatient physical therapy to address the deficits listed in the problem list box on initial evaluation, provide pt/family education and to maximize pt's level of independence in the home and community  environment.      Pt's spiritual, cultural and educational needs considered and pt agreeable to plan of care and goals.     Anticipated barriers to physical therapy: attendance to treatment, compliance with HEP        Goals:  Formal status as of 2/27/2020  Short Term Goals: 6 weeks   1. Pt will report compliance with HEP to improve carry over. Ongoing- good compliance reported  2. Pt will be able to perform saccades at 75 bpm without excessive blinking at blank background x 10. Met 12/2/2019- 85 bpm  3. Pt will be able to balance with narrow base of support and eyes closed for 30 seconds without LOB. Met 12/2/19  4. Pt will improve convergence to </= 17 cm to improve oculomotor control. Improved- (2/18/20- 19 cm)     Long Term Goals: 12 weeks   1. Pt will report ability to read for 10 minutes without vision changes. Met- 1/28/2020  2. Pt will perform 10 reps of smooth tracking without nystagmus at end range horizontal motion. improved- minimal nystagmus in L eye at endrange  3. Pt will be able to perform saccades at 90 bpm in dynamic background without excessive blinking or visual strain. Met 12/30/19  4. Pt will improve convergence to </= 10 cm to improve oculomotor control. Improved compared to last assessment- 19 cm  5. Pt will improve cervical SB bilaterally by 10 degrees without pain to improve AROM and return to functional activities without increase in symptoms. Ongoing-  R= 37 deg, L= 42 deg      Plan     Continue with balance on variable surfaces without visual support.  Ensure understanding of all HEP to prepare for discharge in next few visits.      Merari Dougherty,PT, DPT

## 2020-03-19 ENCOUNTER — DOCUMENTATION ONLY (OUTPATIENT)
Dept: REHABILITATION | Facility: HOSPITAL | Age: 60
End: 2020-03-19

## 2020-03-19 NOTE — PROGRESS NOTES
Patient: Octavia Arrington  Date: 3/19/2020  Diagnosis: No diagnosis found.  MRN: [id number]    Left voicemail on 3/19/2020 at 12:00pm with patient due to therapy following updates regarding COVID-19 closely and taking every precaution to ensure the safety of our patients, staff and community.  In an abundance of caution and in an effort to help reduce risk and limit community spread, we have decided to temporarily postpone appointments for patients who may be at increased risk to attend in-person therapy or where therapy is not critically needed at this time. PT left clinic number if patient had any other questions or concerns going forward with plan of care or previously given home exercise program.     Lani Munoz, JONAHT  3/19/2020

## 2020-03-31 ENCOUNTER — TELEPHONE (OUTPATIENT)
Dept: REHABILITATION | Facility: HOSPITAL | Age: 60
End: 2020-03-31

## 2020-03-31 NOTE — TELEPHONE ENCOUNTER
Pt reports compliance with HEP for cervical and thoracic ROM and strengthening. Pt reports that she has been feeling excessively fatigued and achy lately (denies sore throat, breathing difficulty, or cough). PT encouraged increased participation in HEP to decrease headache occurrence.

## 2020-04-01 ENCOUNTER — TELEPHONE (OUTPATIENT)
Dept: NEUROLOGY | Facility: CLINIC | Age: 60
End: 2020-04-01

## 2020-04-08 ENCOUNTER — OFFICE VISIT (OUTPATIENT)
Dept: NEUROLOGY | Facility: CLINIC | Age: 60
End: 2020-04-08
Payer: COMMERCIAL

## 2020-04-08 ENCOUNTER — CLINICAL SUPPORT (OUTPATIENT)
Dept: FAMILY MEDICINE | Facility: CLINIC | Age: 60
End: 2020-04-08
Payer: COMMERCIAL

## 2020-04-08 ENCOUNTER — TELEPHONE (OUTPATIENT)
Dept: FAMILY MEDICINE | Facility: CLINIC | Age: 60
End: 2020-04-08

## 2020-04-08 ENCOUNTER — TELEPHONE (OUTPATIENT)
Dept: NEUROLOGY | Facility: CLINIC | Age: 60
End: 2020-04-08

## 2020-04-08 ENCOUNTER — NURSE TRIAGE (OUTPATIENT)
Dept: ADMINISTRATIVE | Facility: CLINIC | Age: 60
End: 2020-04-08

## 2020-04-08 DIAGNOSIS — Z20.822 SUSPECTED COVID-19 VIRUS INFECTION: ICD-10-CM

## 2020-04-08 DIAGNOSIS — G44.86 CERVICOGENIC HEADACHE: ICD-10-CM

## 2020-04-08 DIAGNOSIS — M54.81 BILATERAL OCCIPITAL NEURALGIA: ICD-10-CM

## 2020-04-08 DIAGNOSIS — H81.90 VESTIBULOPATHY, UNSPECIFIED LATERALITY: ICD-10-CM

## 2020-04-08 DIAGNOSIS — G43.009 MIGRAINE WITHOUT AURA AND WITHOUT STATUS MIGRAINOSUS, NOT INTRACTABLE: ICD-10-CM

## 2020-04-08 DIAGNOSIS — S13.4XXD WHIPLASH INJURY TO NECK, SUBSEQUENT ENCOUNTER: ICD-10-CM

## 2020-04-08 DIAGNOSIS — S06.0X0D CONCUSSION WITHOUT LOSS OF CONSCIOUSNESS, SUBSEQUENT ENCOUNTER: Primary | ICD-10-CM

## 2020-04-08 DIAGNOSIS — H51.9 CONVERGENCE INSUFFICIENCY OR PALSY IN BINOCULAR EYE MOVEMENT: ICD-10-CM

## 2020-04-08 DIAGNOSIS — Z20.822 SUSPECTED COVID-19 VIRUS INFECTION: Primary | ICD-10-CM

## 2020-04-08 DIAGNOSIS — U07.1 COVID-19 VIRUS INFECTION: ICD-10-CM

## 2020-04-08 DIAGNOSIS — F07.81 POST-CONCUSSION SYNDROME: ICD-10-CM

## 2020-04-08 DIAGNOSIS — G44.329 CHRONIC POST-TRAUMATIC HEADACHE, NOT INTRACTABLE: ICD-10-CM

## 2020-04-08 PROCEDURE — U0002 COVID-19 LAB TEST NON-CDC: HCPCS

## 2020-04-08 PROCEDURE — 99213 PR OFFICE/OUTPT VISIT, EST, LEVL III, 20-29 MIN: ICD-10-PCS | Mod: 95,,, | Performed by: PSYCHIATRY & NEUROLOGY

## 2020-04-08 PROCEDURE — 99213 OFFICE O/P EST LOW 20 MIN: CPT | Mod: 95,,, | Performed by: PSYCHIATRY & NEUROLOGY

## 2020-04-08 RX ORDER — TOPIRAMATE 100 MG/1
100 TABLET, FILM COATED ORAL NIGHTLY
Qty: 90 TABLET | Refills: 3 | Status: SHIPPED | OUTPATIENT
Start: 2020-04-08 | End: 2020-09-15

## 2020-04-08 NOTE — TELEPHONE ENCOUNTER
----- Message from Gerard Cavazos III, MD sent at 4/8/2020  8:32 AM CDT -----  Please set up a follow up with me in 3 months

## 2020-04-08 NOTE — PROGRESS NOTES
The patient location is: Home  The chief complaint leading to consultation is: concussion  Visit type: Virtual visit with synchronous audio and video  Total time spent with patient: 15  Each patient to whom he or she provides medical services by telemedicine is:  (1) informed of the relationship between the physician and patient and the respective role of any other health care provider with respect to management of the patient; and (2) notified that he or she may decline to receive medical services by telemedicine and may withdraw from such care at any time.    Subjective:       Patient ID: Octavia Arrington is a 60 y.o. female.    Interval History:  Octavia Arrington is here for follow up. Their condition has changed dramatically.  The patient likely suffered from a COVID-19 infection.  She notes that she had severe headaches chills and shortness of breath for at least 2 weeks which just stopped yesterday.  Her  actually had similar symptoms and was hospitalized on 04/06/2020 at [facility].  She notes that the nurse in the ICU told her that he was COVID-19 positive.  She notes that she stop taking her Topamax because she was concerned about medication interaction at the time.  We have discussed that this is a significant setback with regards to her headache control after her concussion.      Objective:   Patient is awake, alert and oriented to person, place and time. Moves all extremities antigravity. Cranial Nerves 2-12 are without gross focal deficit.     Focused telemedicine examination was undertaken today. Over 50% of face to face time of 15 minute visit time was in giving guidance, counseling and discussing treatment options.    No results found for this or any previous visit.    Assessment/Plan:     Problem List Items Addressed This Visit        ENT    Vestibulopathy      Other Visit Diagnoses     Concussion without loss of consciousness, subsequent encounter    -  Primary    Post-concussion  syndrome        Convergence insufficiency or palsy in binocular eye movement        Cervicogenic headache        Bilateral occipital neuralgia        Chronic post-traumatic headache, not intractable        Relevant Medications    topiramate (TOPAMAX) 100 MG tablet    Whiplash injury to neck, subsequent encounter        Migraine without aura and without status migrainosus, not intractable        Relevant Medications    topiramate (TOPAMAX) 100 MG tablet    COVID-19 virus infection            60-year-old female presents for evaluation and follow-up after concussion sustained at work on 03/14/2019.  At this time she has had a significant setback in her recovery related to COVID-19.  In her recovery phase we have discussed that it is important for her to start her Topamax 100 mg to try to get better control of her headaches.  She notes she is dealing with a significant headache on today's visit which is holocephalic and leaving her light and sound sensitive although she notes she is less nauseous without chills.  She notes she did not spike a fever at all during her 2 week course of having COVID-19.  I have given her a number to a free nursing line through [facility] to get information for COVID-19, [phone].  The patient desires to switch her primary care doctor to [facility].  I have explained the patient that since her  is COVID-19 positive and had very similar symptoms and in have added the same space and she had symptoms before him she likely also is COVID-19 positive and this is likely the trigger for her worsening headaches in the absence of a new head injury.  I will see her back in 2 or 3 months the virtual visit or in person depending on how this viral outbreak pans out.  I have encouraged her to continue her home exercise program.    The patient verbalizes understanding and agreement with the treatment plan. I have discussed risks, benefits and alternatives to the treatment plan. Questions were sought  and answered to her stated verbal satisfaction.        Joce Cavazos MD    This note is dictated on M*Modal Fluency Direct word recognition program. There are word recognition mistakes that are occasionally missed on review.

## 2020-04-08 NOTE — TELEPHONE ENCOUNTER
Pt is calling in to be tested for coronavirus. Pt's  is in hospital right now for coronavirus and the pt was exposed. The pt had a virtual visit with her neurologist today who recommended her to call the COVID hotline due to her cough and coronavirus exposure in order to be tested. The pt does not have a fever, but she does have cough, shortness of breath at times, loss of smell, muscle pains. Pt was referred to [facility] to see a provider for an order for a coronavirus test. Pt has several symptoms, but did not have fever, however, close contacts with cough who are elderly are recommended to be tested at this point since immunosuppression can affect the body's ability to mount a fever response. Pt got into the telehealth waiting room and will call back with any questions.    Reason for Disposition   [1] Fever (or feeling feverish) OR cough AND [2] within 14 Days of COVID-19 EXPOSURE (Close Contact)    Additional Information   Negative: SEVERE difficulty breathing (e.g., struggling for each breath, speak in single words, bluish lips)   Negative: Sounds like a life-threatening emergency to the triager   Negative: [1] Adult has symptoms of COVID-19 (fever, cough, or SOB) AND [2] lab test positive   Negative: [1] Adult has symptoms of COVID-19 (fever, cough or SOB) AND [2] major community spread where patient lives AND [3] testing not being done for mild symptoms   Negative: [1] Difficulty breathing (shortness of breath) occurs AND [2] onset > 14 days after COVID-19 EXPOSURE (Close Contact) AND [3] no major community spread   Negative: [1] Cough occurs AND [2] onset > 14 days after COVID-19 EXPOSURE AND [3] no major community spread   Negative: [1] Common cold symptoms AND [2] onset > 14 days after COVID-19 EXPOSURE AND [3] no major community spread   Negative: [1] Difficulty breathing occurs AND [2] within 14 days of COVID-19 EXPOSURE (Close Contact)   Negative: Patient sounds  very sick or weak to the triager    Protocols used: CORONAVIRUS (COVID-19) EXPOSURE-A-[place]

## 2020-04-08 NOTE — TELEPHONE ENCOUNTER
Called pt and gave her the number to the [facility] ([phone]).  Told her to call them to make an appt and give them the make/model of her vechile for drive through testing.  Pt verbalizes understanding.

## 2020-04-09 LAB — SARS-COV-2 RNA RESP QL NAA+PROBE: DETECTED

## 2020-04-27 ENCOUNTER — DOCUMENTATION ONLY (OUTPATIENT)
Dept: REHABILITATION | Facility: HOSPITAL | Age: 60
End: 2020-04-27

## 2020-04-27 NOTE — PROGRESS NOTES
"  Outpatient Therapy Discharge Summary     Name: Octavia Arrington  Clinic Number: [id number]    Therapy Diagnosis:    Decreased ROM of neck      Decreased strength      Vestibulopathy, unspecified laterality      Physician: No ref. provider found    Physician Orders: Eval and Treat  Medical Diagnosis: S06.0X0D (ICD-10-CM) - Concussion without loss of consciousness, subsequent encounter H81.90 (ICD-10-CM) - Vestibulopathy, unspecified laterality F07.81 (ICD-10-CM) - Post-concussion syndrome R51 (ICD-10-CM) - Cervicogenic headache M54.81 (ICD-10-CM) - Bilateral occipital neuralgia S13.4XXD (ICD-10-CM) - Whiplash injury to neck, subsequent encounter  Evaluation Date: 6/28/19 with periods of a few months between treatment sessions.       Date of Last visit: 3/3/2020  Total Visits Received: 25      Assessment      Assessment on 2/27/2020 when last reassessed:  Octavia tolerated today's PT session well. Pt reports she still has headaches and "dizziness" but this continues to improve. Pt indicates occasional ice pick headaches, but no longer reports frequent, daily headaches or cervical pain. Pt reports improved compliance with HEP for postural range of Motion and strengthening and pencil push ups. Testing done on 2/18/2020 with following assessment: "Pt demonstrates improved (but still abnormal) convergence point.  Functional Gait Assessment was performed with pt reporting "wooziness" during gait with head turns, but no significant deviation from straight path. Pt was not able to walk with eyes closed, veering to left of center each attempt. Pt had consistent loss of balance with pivot turns; this could be influced by history of TKAs. Overall, pt is not at risk for falls per this assessment." Pt with improved smooth pursuit noted by only minimal end range nystagmus with horizontal saccades. Pt cervical range of motion improvements from last assessment of cervical side bend bilaterally, with some continued restrictions. Denies " any pain during testing. With pt becoming more consistent with HEP, she has noticed a decrease in headaches and cervical pain. Pt can benefit from continuing therapy for ~3 more sessions to become as close to prior level as possible prior to d/c and ensure understanding of HEP.    Goals:   Formal status as of 2/27/2020  Short Term Goals: 6 weeks   1. Pt will report compliance with HEP to improve carry over. Ongoing- good compliance reported  2. Pt will be able to perform saccades at 75 bpm without excessive blinking at blank background x 10. Met 12/2/2019- 85 bpm  3. Pt will be able to balance with narrow base of support and eyes closed for 30 seconds without LOB. Met 12/2/19  4. Pt will improve convergence to </= 17 cm to improve oculomotor control. Improved- (2/18/20- 19 cm)     Long Term Goals: 12 weeks   1. Pt will report ability to read for 10 minutes without vision changes. Met- 1/28/2020  2. Pt will perform 10 reps of smooth tracking without nystagmus at end range horizontal motion. improved- minimal nystagmus in L eye at endrange  3. Pt will be able to perform saccades at 90 bpm in dynamic background without excessive blinking or visual strain. Met 12/30/19  4. Pt will improve convergence to </= 10 cm to improve oculomotor control. Improved compared to last assessment- 19 cm  5. Pt will improve cervical SB bilaterally by 10 degrees without pain to improve AROM and return to functional activities without increase in symptoms. Ongoing-  R= 37 deg, L= 42 deg    Discharge reason: Other:  Pt reports decrease in symptoms and is able to manage with HEP. Recently diagnosed with COVID-19 and is in isolation.     Plan   This patient is discharged from Physical Therapy    Lani Munoz DPT

## 2020-07-06 ENCOUNTER — TELEPHONE (OUTPATIENT)
Dept: NEUROLOGY | Facility: CLINIC | Age: 60
End: 2020-07-06

## 2020-07-06 NOTE — TELEPHONE ENCOUNTER
----- Message from Ant Foss sent at 7/6/2020  2:39 PM CDT -----  Contact: pt @ [phone]  Pt calling to reschedule appt on 07/07/20 to another date, pt states she does not want to do a VV

## 2020-09-15 ENCOUNTER — OFFICE VISIT (OUTPATIENT)
Dept: NEUROLOGY | Facility: CLINIC | Age: 60
End: 2020-09-15
Payer: COMMERCIAL

## 2020-09-15 DIAGNOSIS — S06.0X0D CONCUSSION WITHOUT LOSS OF CONSCIOUSNESS, SUBSEQUENT ENCOUNTER: Primary | ICD-10-CM

## 2020-09-15 PROBLEM — G44.329 CHRONIC POST-TRAUMATIC HEADACHE, NOT INTRACTABLE: Status: ACTIVE | Noted: 2020-09-15

## 2020-09-15 PROBLEM — G44.329 CHRONIC POST-TRAUMATIC HEADACHE, NOT INTRACTABLE: Status: RESOLVED | Noted: 2020-09-15 | Resolved: 2020-09-15

## 2020-09-15 PROBLEM — H81.90 VESTIBULOPATHY: Status: RESOLVED | Noted: 2019-06-28 | Resolved: 2020-09-15

## 2020-09-15 PROBLEM — H53.9 DIFFICULTY READING DUE TO VISUAL PROBLEM: Status: RESOLVED | Noted: 2019-06-03 | Resolved: 2020-09-15

## 2020-09-15 PROBLEM — R29.898 DECREASED ROM OF NECK: Status: RESOLVED | Noted: 2019-06-28 | Resolved: 2020-09-15

## 2020-09-15 PROBLEM — H05.823: Status: RESOLVED | Noted: 2019-06-03 | Resolved: 2020-09-15

## 2020-09-15 PROBLEM — R53.1 DECREASED STRENGTH: Status: RESOLVED | Noted: 2019-06-28 | Resolved: 2020-09-15

## 2020-09-15 PROCEDURE — 99213 OFFICE O/P EST LOW 20 MIN: CPT | Mod: 95,,, | Performed by: PSYCHIATRY & NEUROLOGY

## 2020-09-15 PROCEDURE — 99213 PR OFFICE/OUTPT VISIT, EST, LEVL III, 20-29 MIN: ICD-10-PCS | Mod: 95,,, | Performed by: PSYCHIATRY & NEUROLOGY

## 2020-09-15 NOTE — PROGRESS NOTES
The patient location is: Home  The chief complaint leading to consultation is: Concussion  Visit type: Virtual visit with synchronous audio and video  Total time spent with patient: 15  Each patient to whom he or she provides medical services by telemedicine is:  (1) informed of the relationship between the physician and patient and the respective role of any other health care provider with respect to management of the patient; and (2) notified that he or she may decline to receive medical services by telemedicine and may withdraw from such care at any time.    Subjective:       Patient ID: Octavia Arrington is a 60 y.o. female.    Interval History:  Octavia Arrington is here for follow up. Their condition has clinically improved.  When I last saw her we were discussing that she was still having headaches.  There was some confusion as to whether or not this is related to COVID infection were continued posttraumatic headaches.  She had taken Topamax 100 mg through the summer and she notes that this helps with her headaches significantly.  She notes that this resolved her headaches and she actually stopped taking that medication about a month ago.  She notes that unfortunately she is dealing with the emotional stress of the passing of her  and emotional follow-up on that.  She is going counseling through her [facility] and notes that that have been very helpful.  Will regards to her concussion sustained in March of 2019 she notes that she feels back to 100%.  She notes that in fact she has been working full-time unrestricted since August of 2020.    Objective:   Patient is awake, alert and oriented to person, place and time. Moves all extremities antigravity. Cranial Nerves 2-12 are without gross focal deficit.     Focused telemedicine examination was undertaken today. Over 50% of face to face time of 15 minute visit time was in giving guidance, counseling and discussing treatment options.    No results found for this or  any previous visit.    Assessment/Plan:     Problem List Items Addressed This Visit     None      Visit Diagnoses     Concussion without loss of consciousness, subsequent encounter    -  Primary        60-year-old female presents for evaluation of concussion sustained in March of 2019.  At this time she feels 100% back to her baseline with regards to physical symptoms.  She is still dealing with emotional symptoms however, we have discussed that this is not directly related to her concussion as she has had a tremendous personal loss with the death of her .  She is having appropriate emotional counseling through her [facility] and had that for formal referral to Psychology for that specific reason, which admittedly, is not related to her concussion.  For now I have discussed that I will make a notation that her concussion is fully resolved and that her workmen's comp case is closed at this time.  She may see me back on an as-needed basis if any neurologic issues, but not under this specific workmen's Comp issue.    The patient verbalizes understanding and agreement with the treatment plan. I have discussed risks, benefits and alternatives to the treatment plan. Questions were sought and answered to her stated verbal satisfaction.        Joce Cavazos MD    This note is dictated on M*Modal Fluency Direct word recognition program. There are word recognition mistakes that are occasionally missed on review.

## 2020-12-23 ENCOUNTER — LAB VISIT (OUTPATIENT)
Dept: PRIMARY CARE CLINIC | Facility: OTHER | Age: 60
End: 2020-12-23
Attending: INTERNAL MEDICINE
Payer: COMMERCIAL

## 2020-12-23 DIAGNOSIS — Z03.818 ENCOUNTER FOR OBSERVATION FOR SUSPECTED EXPOSURE TO OTHER BIOLOGICAL AGENTS RULED OUT: ICD-10-CM

## 2020-12-23 PROCEDURE — U0003 INFECTIOUS AGENT DETECTION BY NUCLEIC ACID (DNA OR RNA); SEVERE ACUTE RESPIRATORY SYNDROME CORONAVIRUS 2 (SARS-COV-2) (CORONAVIRUS DISEASE [COVID-19]), AMPLIFIED PROBE TECHNIQUE, MAKING USE OF HIGH THROUGHPUT TECHNOLOGIES AS DESCRIBED BY CMS-2020-01-R: HCPCS

## 2020-12-24 LAB — SARS-COV-2 RNA RESP QL NAA+PROBE: NOT DETECTED

## 2021-01-17 ENCOUNTER — CLINICAL SUPPORT (OUTPATIENT)
Dept: URGENT CARE | Facility: CLINIC | Age: 61
End: 2021-01-17
Payer: COMMERCIAL

## 2021-01-17 DIAGNOSIS — Z78.9 NO KNOWN HEALTH PROBLEMS: Primary | ICD-10-CM

## 2021-01-17 LAB
CTP QC/QA: YES
SARS-COV-2 RDRP RESP QL NAA+PROBE: NEGATIVE

## 2021-01-17 PROCEDURE — 99211 PR OFFICE/OUTPT VISIT, EST, LEVL I: ICD-10-PCS | Mod: S$GLB,,, | Performed by: NURSE PRACTITIONER

## 2021-01-17 PROCEDURE — U0002 COVID-19 LAB TEST NON-CDC: HCPCS | Mod: QW,S$GLB,, | Performed by: NURSE PRACTITIONER

## 2021-01-17 PROCEDURE — 99211 OFF/OP EST MAY X REQ PHY/QHP: CPT | Mod: S$GLB,,, | Performed by: NURSE PRACTITIONER

## 2021-01-17 PROCEDURE — U0002: ICD-10-PCS | Mod: QW,S$GLB,, | Performed by: NURSE PRACTITIONER

## 2021-04-26 ENCOUNTER — PATIENT MESSAGE (OUTPATIENT)
Dept: RESEARCH | Facility: HOSPITAL | Age: 61
End: 2021-04-26

## 2021-07-28 ENCOUNTER — TELEPHONE (OUTPATIENT)
Dept: NEUROLOGY | Facility: CLINIC | Age: 61
End: 2021-07-28

## 2021-08-04 ENCOUNTER — OFFICE VISIT (OUTPATIENT)
Dept: NEUROLOGY | Facility: CLINIC | Age: 61
End: 2021-08-04
Payer: COMMERCIAL

## 2021-08-04 VITALS
BODY MASS INDEX: 40.32 KG/M2 | HEART RATE: 91 BPM | WEIGHT: 250.88 LBS | SYSTOLIC BLOOD PRESSURE: 134 MMHG | DIASTOLIC BLOOD PRESSURE: 89 MMHG | HEIGHT: 66 IN

## 2021-08-04 DIAGNOSIS — F43.21 COMPLICATED GRIEVING: ICD-10-CM

## 2021-08-04 DIAGNOSIS — H40.9 GLAUCOMA OF BOTH EYES, UNSPECIFIED GLAUCOMA TYPE: ICD-10-CM

## 2021-08-04 DIAGNOSIS — H53.8 BLURRING OF VISUAL IMAGE OF BOTH EYES: Primary | ICD-10-CM

## 2021-08-04 PROCEDURE — 1159F MED LIST DOCD IN RCRD: CPT | Mod: CPTII,S$GLB,, | Performed by: PSYCHIATRY & NEUROLOGY

## 2021-08-04 PROCEDURE — 3008F PR BODY MASS INDEX (BMI) DOCUMENTED: ICD-10-PCS | Mod: CPTII,S$GLB,, | Performed by: PSYCHIATRY & NEUROLOGY

## 2021-08-04 PROCEDURE — 99214 OFFICE O/P EST MOD 30 MIN: CPT | Mod: S$GLB,,, | Performed by: PSYCHIATRY & NEUROLOGY

## 2021-08-04 PROCEDURE — 99999 PR PBB SHADOW E&M-EST. PATIENT-LVL IV: CPT | Mod: PBBFAC,,, | Performed by: PSYCHIATRY & NEUROLOGY

## 2021-08-04 PROCEDURE — 3079F PR MOST RECENT DIASTOLIC BLOOD PRESSURE 80-89 MM HG: ICD-10-PCS | Mod: CPTII,S$GLB,, | Performed by: PSYCHIATRY & NEUROLOGY

## 2021-08-04 PROCEDURE — 3075F PR MOST RECENT SYSTOLIC BLOOD PRESS GE 130-139MM HG: ICD-10-PCS | Mod: CPTII,S$GLB,, | Performed by: PSYCHIATRY & NEUROLOGY

## 2021-08-04 PROCEDURE — 1126F AMNT PAIN NOTED NONE PRSNT: CPT | Mod: CPTII,S$GLB,, | Performed by: PSYCHIATRY & NEUROLOGY

## 2021-08-04 PROCEDURE — 3075F SYST BP GE 130 - 139MM HG: CPT | Mod: CPTII,S$GLB,, | Performed by: PSYCHIATRY & NEUROLOGY

## 2021-08-04 PROCEDURE — 99214 PR OFFICE/OUTPT VISIT, EST, LEVL IV, 30-39 MIN: ICD-10-PCS | Mod: S$GLB,,, | Performed by: PSYCHIATRY & NEUROLOGY

## 2021-08-04 PROCEDURE — 99999 PR PBB SHADOW E&M-EST. PATIENT-LVL IV: ICD-10-PCS | Mod: PBBFAC,,, | Performed by: PSYCHIATRY & NEUROLOGY

## 2021-08-04 PROCEDURE — 3079F DIAST BP 80-89 MM HG: CPT | Mod: CPTII,S$GLB,, | Performed by: PSYCHIATRY & NEUROLOGY

## 2021-08-04 PROCEDURE — 3008F BODY MASS INDEX DOCD: CPT | Mod: CPTII,S$GLB,, | Performed by: PSYCHIATRY & NEUROLOGY

## 2021-08-04 PROCEDURE — 1159F PR MEDICATION LIST DOCUMENTED IN MEDICAL RECORD: ICD-10-PCS | Mod: CPTII,S$GLB,, | Performed by: PSYCHIATRY & NEUROLOGY

## 2021-08-04 PROCEDURE — 1160F RVW MEDS BY RX/DR IN RCRD: CPT | Mod: CPTII,S$GLB,, | Performed by: PSYCHIATRY & NEUROLOGY

## 2021-08-04 PROCEDURE — 1126F PR PAIN SEVERITY QUANTIFIED, NO PAIN PRESENT: ICD-10-PCS | Mod: CPTII,S$GLB,, | Performed by: PSYCHIATRY & NEUROLOGY

## 2021-08-04 PROCEDURE — 1160F PR REVIEW ALL MEDS BY PRESCRIBER/CLIN PHARMACIST DOCUMENTED: ICD-10-PCS | Mod: CPTII,S$GLB,, | Performed by: PSYCHIATRY & NEUROLOGY

## 2021-09-03 ENCOUNTER — PATIENT MESSAGE (OUTPATIENT)
Dept: NEUROLOGY | Facility: CLINIC | Age: 61
End: 2021-09-03

## 2021-09-15 ENCOUNTER — TELEPHONE (OUTPATIENT)
Dept: OPHTHALMOLOGY | Facility: CLINIC | Age: 61
End: 2021-09-15

## 2021-09-29 LAB — BCS RECOMMENDATION EXT: NORMAL

## 2021-10-02 ENCOUNTER — CLINICAL SUPPORT (OUTPATIENT)
Dept: OTHER | Facility: CLINIC | Age: 61
End: 2021-10-02
Payer: COMMERCIAL

## 2021-10-02 DIAGNOSIS — Z00.8 ENCOUNTER FOR OTHER GENERAL EXAMINATION: ICD-10-CM

## 2021-10-03 VITALS — BODY MASS INDEX: 40.49 KG/M2 | HEIGHT: 66 IN

## 2021-10-03 LAB
HDLC SERPL-MCNC: 58 MG/DL
POC CHOLESTEROL, LDL (DOCK): 61 MG/DL
POC CHOLESTEROL, TOTAL: 135 MG/DL
POC GLUCOSE, FASTING: 82 MG/DL (ref 60–110)
TRIGL SERPL-MCNC: 75 MG/DL

## 2021-10-08 ENCOUNTER — TELEPHONE (OUTPATIENT)
Dept: NEUROLOGY | Facility: CLINIC | Age: 61
End: 2021-10-08

## 2022-01-12 ENCOUNTER — OFFICE VISIT (OUTPATIENT)
Dept: OPHTHALMOLOGY | Facility: CLINIC | Age: 62
End: 2022-01-12
Payer: COMMERCIAL

## 2022-01-12 ENCOUNTER — CLINICAL SUPPORT (OUTPATIENT)
Dept: OPHTHALMOLOGY | Facility: CLINIC | Age: 62
End: 2022-01-12
Payer: COMMERCIAL

## 2022-01-12 ENCOUNTER — TELEPHONE (OUTPATIENT)
Dept: OPHTHALMOLOGY | Facility: CLINIC | Age: 62
End: 2022-01-12
Payer: COMMERCIAL

## 2022-01-12 ENCOUNTER — TELEPHONE (OUTPATIENT)
Dept: OPHTHALMOLOGY | Facility: CLINIC | Age: 62
End: 2022-01-12

## 2022-01-12 DIAGNOSIS — H25.13 NUCLEAR SCLEROSIS OF BOTH EYES: ICD-10-CM

## 2022-01-12 DIAGNOSIS — H40.1131 PRIMARY OPEN ANGLE GLAUCOMA (POAG) OF BOTH EYES, MILD STAGE: Primary | ICD-10-CM

## 2022-01-12 DIAGNOSIS — H35.033 HYPERTENSIVE RETINOPATHY OF BOTH EYES, GRADE 1: ICD-10-CM

## 2022-01-12 PROCEDURE — 92020 PR SPECIAL EYE EVAL,GONIOSCOPY: ICD-10-PCS | Mod: S$GLB,,, | Performed by: STUDENT IN AN ORGANIZED HEALTH CARE EDUCATION/TRAINING PROGRAM

## 2022-01-12 PROCEDURE — 1159F MED LIST DOCD IN RCRD: CPT | Mod: CPTII,S$GLB,, | Performed by: STUDENT IN AN ORGANIZED HEALTH CARE EDUCATION/TRAINING PROGRAM

## 2022-01-12 PROCEDURE — 76514 ECHO EXAM OF EYE THICKNESS: CPT | Mod: S$GLB,,, | Performed by: STUDENT IN AN ORGANIZED HEALTH CARE EDUCATION/TRAINING PROGRAM

## 2022-01-12 PROCEDURE — 92020 GONIOSCOPY: CPT | Mod: S$GLB,,, | Performed by: STUDENT IN AN ORGANIZED HEALTH CARE EDUCATION/TRAINING PROGRAM

## 2022-01-12 PROCEDURE — 1159F PR MEDICATION LIST DOCUMENTED IN MEDICAL RECORD: ICD-10-PCS | Mod: CPTII,S$GLB,, | Performed by: STUDENT IN AN ORGANIZED HEALTH CARE EDUCATION/TRAINING PROGRAM

## 2022-01-12 PROCEDURE — 92083 HUMPHREY VISUAL FIELD - OU - BOTH EYES: ICD-10-PCS | Mod: S$GLB,,, | Performed by: STUDENT IN AN ORGANIZED HEALTH CARE EDUCATION/TRAINING PROGRAM

## 2022-01-12 PROCEDURE — 99999 PR PBB SHADOW E&M-EST. PATIENT-LVL II: ICD-10-PCS | Mod: PBBFAC,,, | Performed by: STUDENT IN AN ORGANIZED HEALTH CARE EDUCATION/TRAINING PROGRAM

## 2022-01-12 PROCEDURE — 99999 PR PBB SHADOW E&M-EST. PATIENT-LVL II: CPT | Mod: PBBFAC,,, | Performed by: STUDENT IN AN ORGANIZED HEALTH CARE EDUCATION/TRAINING PROGRAM

## 2022-01-12 PROCEDURE — 76514 PR  US, EYE, FOR CORNEAL THICKNESS: ICD-10-PCS | Mod: S$GLB,,, | Performed by: STUDENT IN AN ORGANIZED HEALTH CARE EDUCATION/TRAINING PROGRAM

## 2022-01-12 PROCEDURE — 99214 PR OFFICE/OUTPT VISIT, EST, LEVL IV, 30-39 MIN: ICD-10-PCS | Mod: S$GLB,,, | Performed by: STUDENT IN AN ORGANIZED HEALTH CARE EDUCATION/TRAINING PROGRAM

## 2022-01-12 PROCEDURE — 99214 OFFICE O/P EST MOD 30 MIN: CPT | Mod: S$GLB,,, | Performed by: STUDENT IN AN ORGANIZED HEALTH CARE EDUCATION/TRAINING PROGRAM

## 2022-01-12 PROCEDURE — 1160F RVW MEDS BY RX/DR IN RCRD: CPT | Mod: CPTII,S$GLB,, | Performed by: STUDENT IN AN ORGANIZED HEALTH CARE EDUCATION/TRAINING PROGRAM

## 2022-01-12 PROCEDURE — 92133 POSTERIOR SEGMENT OCT OPTIC NERVE(OCULAR COHERENCE TOMOGRAPHY) - OU - BOTH EYES: ICD-10-PCS | Mod: S$GLB,,, | Performed by: STUDENT IN AN ORGANIZED HEALTH CARE EDUCATION/TRAINING PROGRAM

## 2022-01-12 PROCEDURE — 92083 EXTENDED VISUAL FIELD XM: CPT | Mod: S$GLB,,, | Performed by: STUDENT IN AN ORGANIZED HEALTH CARE EDUCATION/TRAINING PROGRAM

## 2022-01-12 PROCEDURE — 1160F PR REVIEW ALL MEDS BY PRESCRIBER/CLIN PHARMACIST DOCUMENTED: ICD-10-PCS | Mod: CPTII,S$GLB,, | Performed by: STUDENT IN AN ORGANIZED HEALTH CARE EDUCATION/TRAINING PROGRAM

## 2022-01-12 PROCEDURE — 92133 CPTRZD OPH DX IMG PST SGM ON: CPT | Mod: S$GLB,,, | Performed by: STUDENT IN AN ORGANIZED HEALTH CARE EDUCATION/TRAINING PROGRAM

## 2022-01-12 RX ORDER — LATANOPROST 50 UG/ML
1 SOLUTION/ DROPS OPHTHALMIC DAILY
Qty: 2.5 ML | Refills: 3 | Status: SHIPPED | OUTPATIENT
Start: 2022-01-12 | End: 2022-04-13 | Stop reason: SDUPTHER

## 2022-01-12 NOTE — PROGRESS NOTES
Visual field test done.  Patient stated no latex allergies used coverlet             Glasses Prescription     Sphere Cylinder Dist VA Add Horz Prism Vert Prism   Right +0.50 Sphere 20/20 +2.75 1.0 out 0.5 down   Left -0.25 Sphere 20/20 +2.75 1.0 out 0.5 up        spinal precautions/TLSO when OOB

## 2022-01-12 NOTE — TELEPHONE ENCOUNTER
----- Message from Hoda Kingsley sent at 1/12/2022  2:54 PM CST -----  Regarding: Returning phone call  Pt returning call from us with regard to her OCT and scheduling appt. She can be reached again at [phone]

## 2022-01-12 NOTE — PROGRESS NOTES
Subjective:       Patient ID: Octavia Arrington is a 61 y.o. female.    Chief Complaint: Glaucoma    HPI     Pt here for glaucoma evaluation per neurology. Pt states that she has   fallen and hit her head a few times and the last time was in April 2020.   Pt states that she saw neurology after the last fall because she had a   concussion. Per pt, her neurologist wanted pt to see Dr. Fitzpatrick and be   evaluated for glaucoma. Pt states that vision is blurry when she is   looking at something starlight ahead but it is clearer if she looks down.   Pt has seen Dr. Cornejo in 2019 and was prescribed prism glasses but she   states she does not wear them all the time.    1. Hx Anterior Uveitis OU  2. Hx Esotropia and Hypertropia (exacerbated with concussion)    Last edited by Sandra Vogel MA on 1/12/2022 10:18 AM. (History)             Assessment & Plan   Primary open angle glaucoma (POAG) of both eyes, mild stage  -     Color Fundus Photography - OU - Both Eyes; Future  -     Posterior Segment OCT Optic Nerve- Both eyes; Future  -     Cordova Visual Field - OU - Extended - Both Eyes; Future    Nuclear sclerosis of both eyes    Hypertensive retinopathy of both eyes, grade 1      POAG mild OU  -Fhx (-), Steroids (-),Trauma(+concussion)  -Drops: -  -Drop intolerance/contraindication: -  -Laser: -  -Surgeries: -  -CCT: 536 // 540  -Gonio: SS OU    HVF today - Nonspecific non-contiguous defect inferiorly, may be early IAD OD // full OS  OCT-RNFL today - Low TI,  Borderline T OD // low TI,T borderline TS OS    Enough RNFL thinning to warrant diagnosis of POAG - mild    OCT resulted after patient left for the day - called patient and discussed results and diagnosis over the phone  Recommend starting latanoprost qHS OU - discussed with patient  Phone disconnected during the phone call and wasn't able to complete conversation -- called again and left a message relaying this message      Nuclear sclerosis OU  Not visually significant.  Monitor.    HTN Retinopathy OU  AV nicking  BP control with PCP      PLAN  Start latanoprost qHS OU - sent to pharmacy    RTC 3 months IOP check and optos     Hailey Sloan M.D., M.S.  Department of Ophthalmology   Division of Glaucoma Surgery  [facility]

## 2022-01-12 NOTE — TELEPHONE ENCOUNTER
Dr. Sloan would like to review the results of OCT with pt and start pt on glaucoma drops. Also, pt needs to return for IOP ck in 3 months. I left a message for pt advising pt to call back to speak with Dr. Sloan

## 2022-02-10 ENCOUNTER — TELEPHONE (OUTPATIENT)
Dept: NEUROLOGY | Facility: CLINIC | Age: 62
End: 2022-02-10
Payer: COMMERCIAL

## 2022-02-10 NOTE — TELEPHONE ENCOUNTER
----- Message from Vaughn Lai sent at 2/10/2022  2:23 PM CST -----  Regarding: appt  Name of Who is Calling: OREN ALCANTAR [[id number]]           What is the request in detail: Pt would like a call back from staff in regards to when to f/u with provider, pl advise           Can the clinic reply by [facility]: no           What Number to Call Back if not in [facility]: [phone]

## 2022-02-11 ENCOUNTER — TELEPHONE (OUTPATIENT)
Dept: NEUROLOGY | Facility: CLINIC | Age: 62
End: 2022-02-11
Payer: COMMERCIAL

## 2022-02-16 ENCOUNTER — TELEPHONE (OUTPATIENT)
Dept: NEUROLOGY | Facility: CLINIC | Age: 62
End: 2022-02-16
Payer: COMMERCIAL

## 2022-02-16 NOTE — TELEPHONE ENCOUNTER
----- Message from Chioma Lagunas sent at 2/16/2022  9:59 AM CST -----  Regarding: Call Back Request  Pt calling to speak with staff    [phone] (home)

## 2022-04-13 ENCOUNTER — OFFICE VISIT (OUTPATIENT)
Dept: PSYCHIATRY | Facility: CLINIC | Age: 62
End: 2022-04-13
Payer: COMMERCIAL

## 2022-04-13 ENCOUNTER — OFFICE VISIT (OUTPATIENT)
Dept: OPHTHALMOLOGY | Facility: CLINIC | Age: 62
End: 2022-04-13
Payer: COMMERCIAL

## 2022-04-13 DIAGNOSIS — F43.21 COMPLICATED GRIEVING: ICD-10-CM

## 2022-04-13 DIAGNOSIS — F43.21 ADJUSTMENT DISORDER WITH DEPRESSED MOOD: Primary | ICD-10-CM

## 2022-04-13 DIAGNOSIS — H40.1131 PRIMARY OPEN ANGLE GLAUCOMA (POAG) OF BOTH EYES, MILD STAGE: ICD-10-CM

## 2022-04-13 DIAGNOSIS — H25.13 NUCLEAR SCLEROSIS OF BOTH EYES: Primary | ICD-10-CM

## 2022-04-13 PROCEDURE — 99999 PR PBB SHADOW E&M-EST. PATIENT-LVL II: ICD-10-PCS | Mod: PBBFAC,,, | Performed by: STUDENT IN AN ORGANIZED HEALTH CARE EDUCATION/TRAINING PROGRAM

## 2022-04-13 PROCEDURE — 1159F PR MEDICATION LIST DOCUMENTED IN MEDICAL RECORD: ICD-10-PCS | Mod: CPTII,S$GLB,, | Performed by: STUDENT IN AN ORGANIZED HEALTH CARE EDUCATION/TRAINING PROGRAM

## 2022-04-13 PROCEDURE — 1159F PR MEDICATION LIST DOCUMENTED IN MEDICAL RECORD: ICD-10-PCS | Mod: CPTII,95,, | Performed by: SOCIAL WORKER

## 2022-04-13 PROCEDURE — 92250 FUNDUS PHOTOGRAPHY W/I&R: CPT | Mod: S$GLB,,, | Performed by: STUDENT IN AN ORGANIZED HEALTH CARE EDUCATION/TRAINING PROGRAM

## 2022-04-13 PROCEDURE — 1160F PR REVIEW ALL MEDS BY PRESCRIBER/CLIN PHARMACIST DOCUMENTED: ICD-10-PCS | Mod: CPTII,S$GLB,, | Performed by: STUDENT IN AN ORGANIZED HEALTH CARE EDUCATION/TRAINING PROGRAM

## 2022-04-13 PROCEDURE — 92250 COLOR FUNDUS PHOTOGRAPHY - OU - BOTH EYES: ICD-10-PCS | Mod: S$GLB,,, | Performed by: STUDENT IN AN ORGANIZED HEALTH CARE EDUCATION/TRAINING PROGRAM

## 2022-04-13 PROCEDURE — 99999 PR PBB SHADOW E&M-EST. PATIENT-LVL II: CPT | Mod: PBBFAC,,, | Performed by: STUDENT IN AN ORGANIZED HEALTH CARE EDUCATION/TRAINING PROGRAM

## 2022-04-13 PROCEDURE — 90791 PR PSYCHIATRIC DIAGNOSTIC EVALUATION: ICD-10-PCS | Mod: 95,,, | Performed by: SOCIAL WORKER

## 2022-04-13 PROCEDURE — 1159F MED LIST DOCD IN RCRD: CPT | Mod: CPTII,S$GLB,, | Performed by: STUDENT IN AN ORGANIZED HEALTH CARE EDUCATION/TRAINING PROGRAM

## 2022-04-13 PROCEDURE — 1160F RVW MEDS BY RX/DR IN RCRD: CPT | Mod: CPTII,S$GLB,, | Performed by: STUDENT IN AN ORGANIZED HEALTH CARE EDUCATION/TRAINING PROGRAM

## 2022-04-13 PROCEDURE — 1159F MED LIST DOCD IN RCRD: CPT | Mod: CPTII,95,, | Performed by: SOCIAL WORKER

## 2022-04-13 PROCEDURE — 99214 PR OFFICE/OUTPT VISIT, EST, LEVL IV, 30-39 MIN: ICD-10-PCS | Mod: S$GLB,,, | Performed by: STUDENT IN AN ORGANIZED HEALTH CARE EDUCATION/TRAINING PROGRAM

## 2022-04-13 PROCEDURE — 99214 OFFICE O/P EST MOD 30 MIN: CPT | Mod: S$GLB,,, | Performed by: STUDENT IN AN ORGANIZED HEALTH CARE EDUCATION/TRAINING PROGRAM

## 2022-04-13 PROCEDURE — 90791 PSYCH DIAGNOSTIC EVALUATION: CPT | Mod: 95,,, | Performed by: SOCIAL WORKER

## 2022-04-13 RX ORDER — LATANOPROST 50 UG/ML
1 SOLUTION/ DROPS OPHTHALMIC DAILY
Qty: 7 ML | Refills: 3 | Status: SHIPPED | OUTPATIENT
Start: 2022-04-13 | End: 2023-09-18

## 2022-04-13 NOTE — PROGRESS NOTES
Subjective:       Patient ID: Octavia Arrington is a 62 y.o. female.    Chief Complaint: Glaucoma (3 month ck with Optos today and pt states no changes since last exam )      Comments     DLS: 1/10/12    Started latanoprost last visit - no trouble    1. POAG OU  2. NS OU  3. HTN Retinopathy OU    MEDS:  Latanoprost QHS OU          Assessment & Plan   Nuclear sclerosis of both eyes    Primary open angle glaucoma (POAG) of both eyes, mild stage  -     Color Fundus Photography - OU - Both Eyes      POAG mild OU  -Fhx (-), Steroids (-),Trauma(+concussion)  -Drops: Latanoprost qHS OU  -Drop intolerance/contraindication: -  -Laser: -  -Surgeries: -  -CCT: 536 // 540  -Gonio: SS OU    1/22 HVF - Nonspecific non-contiguous defect inferiorly, may be early IAD OD // full OS  1/22 OCT-RNFL Low TI,  Borderline T OD // low TI,T borderline TS OS    Enough RNFL thinning to warrant diagnosis of POAG - mild    IOP somewhat improved on Latanoprost  Ok to continue     Nuclear sclerosis OU  Not visually significant. Monitor.    HTN Retinopathy OU  AV nicking  BP control with PCP      PLAN  Continue latanoprost qHS OU - send 90-day Rx    RTC 6 months IOP check and gonio      Hailey Sloan M.D., M.S.  Department of Ophthalmology   Division of Glaucoma Surgery  [facility]

## 2022-04-13 NOTE — PROGRESS NOTES
"Psychiatry Initial Visit (PhD/LCSW)  Diagnostic Interview - CPT 96011    The patient location is: [place], [place]  The chief complaint leading to consultation is: adjustment     Visit type: audiovisual    Face to Face time with patient: 13 minutes  30 minutes of total time spent on the encounter, which includes face to face time and non-face to face time preparing to see the patient (eg, review of tests), Obtaining and/or reviewing separately obtained history, Documenting clinical information in the electronic or other health record, Independently interpreting results (not separately reported) and communicating results to the patient/family/caregiver, or Care coordination (not separately reported).     Each patient to whom he or she provides medical services by telemedicine is:  (1) informed of the relationship between the physician and patient and the respective role of any other health care provider with respect to management of the patient; and (2) notified that he or she may decline to receive medical services by telemedicine and may withdraw from such care at any time.    Notes:     Date: 4/13/2022    Site: Telemed    Referral source: Dr. Gerard Velasquez, Neurologist     Clinical status of patient: Outpatient    Octavia Arrington, a 62 y.o. female, for initial evaluation visit.  Met with patient.    Chief complaint/reason for encounter: "I don't need services.  I just made the appointment because he (Dr. Velasquez) asked me to".     History of present illness: Octavia Arrington is a 62 year old, , AAF, presenting to satisfy a referral made by her neurologist.  Pt reports upon meeting with her doctor September 2021, she was experiencing sadness due to having work complete on her home and having to take on the responsibilities alone since her 's death in 2020.  Pt reports at the time she was feeling overwhelmed, sad and stressed; however, denied any current symptoms are concerns.    Pain: noncontributory    Symptoms: "   · Mood: denied  · Anxiety: denied  · Substance abuse: denied  · Cognitive functioning: denied  · Health behaviors: none    Psychiatric history: none    Medical history: high blood pressure and mild glaucoma     Family history of psychiatric illness: son-manic depression    Social history (marriage, employment, etc.): Pt has been a  since April 15, 2020 after almost 20 years of marriage.  Pt has four adult children and resides alone with two puppies.  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      Substance use:   Alcohol: none   Drugs: none   Tobacco: none   Caffeine: coffee and tea     Current medications and drug reactions (include OTC, herbal): see medication list     Strengths and liabilities: Strength: Patient is stable.    Current Evaluation:     Mental Status Exam:  General Appearance:  unremarkable, age appropriate   Speech: normal tone, normal rate, normal pitch, normal volume      Level of Cooperation: cooperative      Thought Processes: normal and logical   Mood: steady      Thought Content: normal, no suicidality, no homicidality, delusions, or paranoia   Affect: congruent and appropriate   Orientation: Oriented x3   Memory: recent >  intact, remote >  intact   Attention Span & Concentration: intact   Fund of General Knowledge: intact and appropriate to age and level of education   Abstract Reasoning: interpretation of similarities was abstract, interpretation of similarities was concrete   Judgment & Insight: good     Language  intact     Diagnostic Impression - Plan:       ICD-10-CM ICD-9-CM   1. Adjustment disorder with depressed mood  F43.21 309.0   2. Complicated grieving  F43.21 309.0       Plan:Schedule a follow up appointment as needed    Return to Clinic: as needed    Length of Service (minutes): 30      Jacquelyn Torres LCSW

## 2022-09-30 LAB — BCS RECOMMENDATION EXT: NORMAL

## 2022-11-22 ENCOUNTER — CLINICAL SUPPORT (OUTPATIENT)
Dept: OTHER | Facility: CLINIC | Age: 62
End: 2022-11-22
Payer: COMMERCIAL

## 2022-11-22 DIAGNOSIS — Z00.8 ENCOUNTER FOR OTHER GENERAL EXAMINATION: ICD-10-CM

## 2022-11-23 VITALS
WEIGHT: 248 LBS | SYSTOLIC BLOOD PRESSURE: 135 MMHG | HEIGHT: 66 IN | BODY MASS INDEX: 39.86 KG/M2 | DIASTOLIC BLOOD PRESSURE: 84 MMHG

## 2022-11-23 LAB
GLUCOSE SERPL-MCNC: 96 MG/DL (ref 60–140)
HDLC SERPL-MCNC: 77 MG/DL
POC CHOLESTEROL, LDL (DOCK): 46 MG/DL
POC CHOLESTEROL, TOTAL: 140 MG/DL
TRIGL SERPL-MCNC: 89 MG/DL

## 2023-04-26 LAB
CHOLESTEROL, TOTAL: 147
EGFR: 83
HDLC SERPL-MCNC: 69 MG/DL
LDL/HDL RATIO: 0.9
LDLC SERPL CALC-MCNC: 62 MG/DL (ref 0–160)
TRIGL SERPL-MCNC: 87 MG/DL
VLDLC SERPL-MCNC: 16 MG/DL

## 2023-08-13 ENCOUNTER — HOSPITAL ENCOUNTER (EMERGENCY)
Facility: HOSPITAL | Age: 63
Discharge: HOME | End: 2023-08-14
Attending: INTERNAL MEDICINE
Payer: COMMERCIAL

## 2023-08-13 DIAGNOSIS — S09.90XA INJURY OF HEAD, INITIAL ENCOUNTER: Primary | ICD-10-CM

## 2023-08-13 PROCEDURE — 99284 EMERGENCY DEPT VISIT MOD MDM: CPT

## 2023-08-13 PROCEDURE — 12001 RPR S/N/AX/GEN/TRNK 2.5CM/<: CPT

## 2023-08-13 NOTE — Clinical Note
"Octavia Garridoberyl Arrington was seen and treated in our emergency department on 8/13/2023.  She may return to work on 08/16/2023.       If you have any questions or concerns, please don't hesitate to call.      Severo Rahman PA-C"

## 2023-08-14 ENCOUNTER — TELEPHONE (OUTPATIENT)
Dept: FAMILY MEDICINE | Facility: CLINIC | Age: 63
End: 2023-08-14
Payer: COMMERCIAL

## 2023-08-14 VITALS
WEIGHT: 256 LBS | OXYGEN SATURATION: 98 % | SYSTOLIC BLOOD PRESSURE: 171 MMHG | HEART RATE: 88 BPM | DIASTOLIC BLOOD PRESSURE: 94 MMHG | RESPIRATION RATE: 18 BRPM | TEMPERATURE: 99 F | BODY MASS INDEX: 41.32 KG/M2

## 2023-08-14 PROCEDURE — 25000003 PHARM REV CODE 250: Performed by: PHYSICIAN ASSISTANT

## 2023-08-14 PROCEDURE — 12001 RPR S/N/AX/GEN/TRNK 2.5CM/<: CPT

## 2023-08-14 RX ORDER — HYDROCODONE BITARTRATE AND ACETAMINOPHEN 5; 325 MG/1; MG/1
1 TABLET ORAL
Status: COMPLETED | OUTPATIENT
Start: 2023-08-14 | End: 2023-08-14

## 2023-08-14 RX ORDER — HYDROCODONE BITARTRATE AND ACETAMINOPHEN 5; 325 MG/1; MG/1
1 TABLET ORAL EVERY 6 HOURS PRN
Qty: 6 TABLET | Refills: 0 | Status: SHIPPED | OUTPATIENT
Start: 2023-08-14

## 2023-08-14 RX ORDER — LIDOCAINE HYDROCHLORIDE 10 MG/ML
5 INJECTION INFILTRATION; PERINEURAL
Status: DISCONTINUED | OUTPATIENT
Start: 2023-08-14 | End: 2023-08-14

## 2023-08-14 RX ORDER — LIDOCAINE HYDROCHLORIDE AND EPINEPHRINE 10; 10 MG/ML; UG/ML
10 INJECTION, SOLUTION INFILTRATION; PERINEURAL
Status: COMPLETED | OUTPATIENT
Start: 2023-08-14 | End: 2023-08-14

## 2023-08-14 RX ADMIN — HYDROCODONE BITARTRATE AND ACETAMINOPHEN 1 TABLET: 5; 325 TABLET ORAL at 01:08

## 2023-08-14 RX ADMIN — LIDOCAINE HYDROCHLORIDE AND EPINEPHRINE 10 ML: 10; 10 INJECTION, SOLUTION INFILTRATION; PERINEURAL at 12:08

## 2023-08-14 NOTE — TELEPHONE ENCOUNTER
----- Message from Estrellita Montero sent at 8/14/2023  9:41 AM CDT -----  Regarding: patient call back  Type: Patient Call Back    Who called: Self     What is the request in detail: Asked for the nurse to give her a call     Can the clinic reply by [facility]? No     Would the patient rather a call back or a response via [facility]? Call     Best call back number: .[phone]

## 2023-08-14 NOTE — DISCHARGE INSTRUCTIONS
Staples should be removed in 5-7 days   Keep the area clean with soap and water daily   Return to the ED for any new or worsening symptoms   Follow-up with your primary care doctor      Thank you for coming to our Emergency Department today. It is important to remember that some problems are difficult to diagnose and may not be found during your Emergency Department visit. Be sure to follow up with your primary care doctor and review all labs/imaging/tests that were performed during this visit with them. Some labs/tests may be outside of the normal range and require non-emergent follow-up and further investigation to help diagnose/exclude/prevent complications or other medical conditions.    If you do not have a primary care doctor, you may contact the one listed on your discharge paperwork or you may also call the [facility] Appointment Desk at [phone] to schedule an appointment and establish care with one. It is important to your health that you have a primary care doctor.    Please take all medications as directed. All medications may potentially have side-effects and it is impossible to predict which medications may give you side-effects or what side-effects (if any) they will give you.. If you feel that you are having a negative effect or side-effect of any medication you should immediately stop taking them and seek medical attention. If you feel that you are having a life-threatening reaction call 211.    Return to the ER with any questions/concerns, new/concerning symptoms, worsening or failure to improve.     Do not drive, swim, climb to height, take a bath or make any important decisions for 24 hours if you have received any pain medications, sedatives or mood altering drugs during your ER visit.

## 2023-08-14 NOTE — TELEPHONE ENCOUNTER
Return call to patient, and informed that she would need to follow up with her previous PCP until she establishes care with . That she has not yet established care, and is not under  care. She acknowledged understanding.

## 2023-08-14 NOTE — ED NOTES
Pt slipped in the bathroom within the past hour and hit the left side of her head on the corner of her sink. No LOC, No blood thinners. Pt has tight joon in her hair and the blood is dried up on and inbetween the joon. Unable to assess laceration until cleaned.

## 2023-08-14 NOTE — ED PROVIDER NOTES
Encounter Date: 8/13/2023       History     Chief Complaint   Patient presents with    Head Injury     Slipped in the bathroom and hit her head on the corner of the sink, No LOC, No blood thinners     63-year-old female presents with head injury.  Slip and fall at home, resulted in laceration to the scalp.  No LOC. does not take blood thinners.  Awake alert and oriented, mentation normal.  No neck pain      Review of patient's allergies indicates:   Allergen Reactions    Bactrim [sulfamethoxazole-trimethoprim] Hives, Swelling and Rash    Iodine      Causes burning sensation on skin    Losartan Rash and Blisters     Causes lips to blister with a rash     Past Medical History:   Diagnosis Date    Cataract     Glaucoma     Hypertension     Uveitis      No past surgical history on file.  Family History   Problem Relation Age of Onset    Cataracts Father     Amblyopia Neg Hx     Blindness Neg Hx     Glaucoma Neg Hx     Macular degeneration Neg Hx     Retinal detachment Neg Hx     Strabismus Neg Hx     Diabetes Neg Hx      Social History     Tobacco Use    Smoking status: Never    Smokeless tobacco: Never   Substance Use Topics    Alcohol use: Never     Review of Systems   Constitutional:  Negative for fever.   HENT:  Negative for sore throat.    Respiratory:  Negative for shortness of breath.    Cardiovascular:  Negative for chest pain.   Gastrointestinal:  Negative for nausea.   Genitourinary:  Negative for dysuria.   Musculoskeletal:  Negative for back pain.   Skin:  Positive for wound. Negative for rash.   Neurological:  Negative for weakness.   Hematological:  Does not bruise/bleed easily.       Physical Exam     Initial Vitals [08/13/23 2338]   BP Pulse Resp Temp SpO2   (!) 171/94 88 18 98.8 °F (37.1 °C) 98 %      MAP       --         Physical Exam    Constitutional: Vital signs are normal. She appears well-developed and well-nourished.   HENT:   Head: Normocephalic and atraumatic.   Right Ear: Hearing normal.    Left Ear: Hearing normal.   2 cm laceration to the scalp.  No trismus on exam, TMJ nontender.  No hemotympanum  No signs of facial injury   Eyes: Conjunctivae are normal.   Cardiovascular:  Normal rate and regular rhythm.           Abdominal: Abdomen is soft. Bowel sounds are normal.   Musculoskeletal:         General: Normal range of motion.      Comments: Moving her extremities well  Cervical spine not tender     Neurological: She is alert and oriented to person, place, and time.   Neurologically intact without red flags or deficits   Skin: Skin is warm and intact.   2 cm laceration to the left side of the scalp  Bleeding controlled   Psychiatric: She has a normal mood and affect. Her speech is normal and behavior is normal. Cognition and memory are normal.         ED Course   Lac Repair    Date/Time: 8/14/2023 12:08 AM    Performed by: Severo Rahman PA-C  Authorized by: Vignesh Cornejo MD    Consent:     Consent obtained:  Verbal    Consent given by:  Patient  Universal protocol:     Patient identity confirmed:  Verbally with patient  Laceration details:     Location:  Scalp    Length (cm):  2  Pre-procedure details:     Preparation:  Patient was prepped and draped in usual sterile fashion  Exploration:     Limited defect created (wound extended): no      Contaminated: no    Treatment:     Area cleansed with:  Povidone-iodine and saline    Amount of cleaning:  Extensive    Irrigation solution:  Sterile saline    Irrigation volume:  1000    Visualized foreign bodies/material removed: no    Skin repair:     Repair method:  Staples    Number of staples:  6  Approximation:     Approximation:  Close  Repair type:     Repair type:  Simple  Post-procedure details:     Dressing:  Adhesive bandage    Procedure completion:  Tolerated well, no immediate complications    Labs Reviewed - No data to display       Imaging Results              CT Cervical Spine Without Contrast (Final result)  Result time 08/14/23  00:38:32      Final result by Sergio Gloria MD (08/14/23 00:38:32)                   Impression:      1.  No CT evidence of acute fracture or traumatic subluxation of the cervical spine.  Mild multilevel degenerative changes as above.    2.  Prominent irregular nodular opacities at the lung apices favored to represent pleuroparenchymal scarring, although a discrete right upper lobe pulmonary nodule is also identified.  Recommend nonemergent chest CT follow-up for further characterization and assessment of the lungs.      Electronically signed by: Sergio Gloria MD  Date:    08/14/2023  Time:    00:38               Narrative:    EXAMINATION:  CT CERVICAL SPINE WITHOUT CONTRAST    CLINICAL HISTORY:  Neck trauma, dangerous injury mechanism (Age 16-64y);    TECHNIQUE:  Low dose axial images, sagittal and coronal reformations were performed though the cervical spine.  Contrast was not administered.    COMPARISON:  None    FINDINGS:  There is straightening of the normal cervical lordosis.  Cervical vertebral body heights appear adequately maintained.  No definite acute displaced fracture identified.  There is intervertebral disc space height loss and endplate degenerative change, most pronounced at the levels of C5-C6 and C6-C7.  At the level of C5-C6, there is a posterior disc osteophyte complex and bilateral uncovertebral spurring with mild right-sided foraminal narrowing.  At the level of C6-C7, there is a posterior disc osteophyte complex and bilateral uncovertebral spurring with probable mild spinal canal stenosis and mild bilateral neural foraminal narrowing, left more so than right.    The prevertebral soft tissues are not significantly thickened.  The trachea is midline and patent.  There are irregular biapical nodular opacities favored to represent pleuroparenchymal scarring.  This measures approximate 1.5 cm at the right lung apex and 1.6 cm at the left lung apex.  Additionally, there is a 0.6 cm  pulmonary nodule within the right upper lobe (axial series 2, image 265).                                       CT Head Without Contrast (Final result)  Result time 08/14/23 00:40:16      Final result by Sergio Gloria MD (08/14/23 00:40:16)                   Impression:      No CT evidence of acute intracranial abnormality.  Left parietal scalp hematoma without definite displaced calvarial fracture.  Clinical correlation and further evaluation as warranted.    Chronic senescent and microvascular ischemic changes.    Incidentally noted empty sella configuration, nonspecific finding, although can be seen with idiopathic intracranial hypertension.  Clinical correlation advised.      Electronically signed by: Sergio Gloria MD  Date:    08/14/2023  Time:    00:40               Narrative:    EXAMINATION:  CT HEAD WITHOUT CONTRAST    CLINICAL HISTORY:  Head trauma, moderate-severe;    TECHNIQUE:  Low dose axial images were obtained through the head.  Coronal and sagittal reformations were also performed. Contrast was not administered.    COMPARISON:  None.    FINDINGS:  There is mild generalized cerebral volume loss.  There is patchy periventricular and supratentorial white matter hypoattenuation which is nonspecific although may relate to sequelae of chronic microvascular ischemic change.  There is no evidence of acute intracranial hemorrhage or midline shift.  Incidental note is made of empty sella configuration.  The basal cisterns are patent. The mastoid air cells and paranasal sinuses are clear of acute process. There is a left parietal scalp soft tissue hematoma with overlying surgical staples.  No definite displaced calvarial fracture identified.                                       Medications   LIDOcaine-EPINEPHrine 1%-1:100,000 injection 10 mL (10 mLs Intradermal Given by Provider 8/14/23 0015)     Medical Decision Making:   History:   Old Medical Records: I decided to obtain old medical records.  Old  Records Summarized: records from clinic visits.  Initial Assessment:   63-year-old female with head injury after mechanical slip and fall  Differential Diagnosis:   Fracture, contusion, ICH, SDH, laceration  ED Management:  Plan:   CT scan of the head and the neck    No acute findings on CT scan of the head of the neck.  There was incidental findings within the pulmonary region noted on the CT scan of the cervical spine.  Patient was made aware of these incidental findings and advised to follow-up with her primary care doctor.  She tolerated the procedure well without any immediate complications or difficulties.  She was advised to keep the area clean with soap and water daily and staples should be removed in 5-7 days                          Clinical Impression:   Final diagnoses:  [S09.90XA] Injury of head, initial encounter (Primary)        ED Disposition Condition    Discharge Stable          ED Prescriptions    None       Follow-up Information       Follow up With Specialties Details Why Contact Info    Rosa Mock NP-C Family Medicine   [address] [place] [id number]  [phone]               Severo Rahman PA-C  08/14/23 0044

## 2023-08-15 ENCOUNTER — OFFICE VISIT (OUTPATIENT)
Dept: URGENT CARE | Facility: CLINIC | Age: 63
End: 2023-08-15
Payer: COMMERCIAL

## 2023-08-15 VITALS
RESPIRATION RATE: 20 BRPM | HEIGHT: 66 IN | OXYGEN SATURATION: 98 % | HEART RATE: 83 BPM | TEMPERATURE: 98 F | SYSTOLIC BLOOD PRESSURE: 142 MMHG | DIASTOLIC BLOOD PRESSURE: 86 MMHG | WEIGHT: 256 LBS | BODY MASS INDEX: 41.14 KG/M2

## 2023-08-15 DIAGNOSIS — M50.30 DDD (DEGENERATIVE DISC DISEASE), CERVICAL: ICD-10-CM

## 2023-08-15 DIAGNOSIS — M62.838 MUSCLE SPASMS OF NECK: Primary | ICD-10-CM
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
## 2023-08-15 PROBLEM — R50.9 FEVER, UNSPECIFIED: Status: ACTIVE | Noted: 2020-04-08

## 2023-08-15 PROBLEM — M75.50 SUBACROMIAL BURSITIS: Status: ACTIVE | Noted: 2023-08-15

## 2023-08-15 PROBLEM — M54.50 LOW BACK PAIN: Status: ACTIVE | Noted: 2023-08-15

## 2023-08-15 PROBLEM — M51.369 DEGENERATION OF LUMBAR INTERVERTEBRAL DISC: Status: ACTIVE | Noted: 2023-08-15

## 2023-08-15 PROBLEM — M22.40 CHONDROMALACIA PATELLAE: Status: ACTIVE | Noted: 2023-08-15

## 2023-08-15 PROBLEM — M71.9 DISORDER OF BURSAE OF SHOULDER REGION: Status: ACTIVE | Noted: 2023-08-15

## 2023-08-15 PROBLEM — M25.559 GREATER TROCHANTERIC PAIN SYNDROME: Status: ACTIVE | Noted: 2023-08-15

## 2023-08-15 PROBLEM — M47.816 ARTHROPATHY OF LUMBAR FACET JOINT: Status: ACTIVE | Noted: 2023-08-15

## 2023-08-15 PROBLEM — M75.20 BICEPS TENDINITIS: Status: ACTIVE | Noted: 2023-08-15

## 2023-08-15 PROBLEM — M17.9 OSTEOARTHRITIS OF KNEE: Status: ACTIVE | Noted: 2023-08-15

## 2023-08-15 PROBLEM — M51.36 DEGENERATION OF LUMBAR INTERVERTEBRAL DISC: Status: ACTIVE | Noted: 2023-08-15

## 2023-08-15 PROBLEM — M75.120 FULL THICKNESS ROTATOR CUFF TEAR: Status: ACTIVE | Noted: 2023-08-15

## 2023-08-15 PROBLEM — M19.019 ACROMIOCLAVICULAR JOINT ARTHRITIS: Status: ACTIVE | Noted: 2023-08-15

## 2023-08-15 PROBLEM — R51.9 HEADACHE: Status: ACTIVE | Noted: 2020-04-08

## 2023-08-15 PROBLEM — S06.0X0A CONCUSSION WITH NO LOSS OF CONSCIOUSNESS: Status: ACTIVE | Noted: 2020-04-08

## 2023-08-15 PROBLEM — M19.91 LOCALIZED, PRIMARY OSTEOARTHRITIS: Status: ACTIVE | Noted: 2023-08-15

## 2023-08-15 PROCEDURE — 99213 PR OFFICE/OUTPT VISIT, EST, LEVL III, 20-29 MIN: ICD-10-PCS | Mod: S$GLB,,, | Performed by: FAMILY MEDICINE

## 2023-08-15 PROCEDURE — 99213 OFFICE O/P EST LOW 20 MIN: CPT | Mod: S$GLB,,, | Performed by: FAMILY MEDICINE

## 2023-08-15 RX ORDER — LIDOCAINE 50 MG/G
2 PATCH TOPICAL DAILY
Qty: 14 PATCH | Refills: 0 | Status: SHIPPED | OUTPATIENT
Start: 2023-08-15 | End: 2023-08-16

## 2023-08-15 RX ORDER — IBUPROFEN 600 MG/1
600 TABLET ORAL EVERY 8 HOURS PRN
Qty: 30 TABLET | Refills: 0 | Status: SHIPPED | OUTPATIENT
Start: 2023-08-15 | End: 2023-08-25

## 2023-08-15 RX ORDER — METHOCARBAMOL 500 MG/1
500 TABLET, FILM COATED ORAL 4 TIMES DAILY PRN
Qty: 40 TABLET | Refills: 0 | Status: SHIPPED | OUTPATIENT
Start: 2023-08-15 | End: 2023-08-25

## 2023-08-15 NOTE — LETTER
August 15, 2023      [facility]  [address], [address]  [place] [address]  Phone: [phone]  Fax: [phone]       Patient: Octavia Arrington   YOB: 1960  Date of Visit: 08/15/2023    To Whom It May Concern:    Kevin Arrington  was at [facility] on 08/15/2023. The patient may return to work/school on 8/17/2023 with no restrictions. If you have any questions or concerns, or if I can be of further assistance, please do not hesitate to contact me.    Sincerely,    Yumiko Charles NP

## 2023-08-15 NOTE — PATIENT INSTRUCTIONS
General Discharge Instructions   PLEASE READ YOUR DISCHARGE INSTRUCTIONS ENTIRELY AS IT CONTAINS IMPORTANT INFORMATION.  If you were prescribed a narcotic or controlled medication, do not drive or operate heavy equipment or machinery while taking these medications.  If you were prescribed antibiotics, please take them to completion.  You must understand that you've received an Urgent Care treatment only and that you may be released before all your medical problems are known or treated. You, the patient, will arrange for follow up care as instructed.    OVER THE COUNTER RECOMMENDATIONS/SUGGESTIONS.    Make sure to stay well hydrated.    Use Nasal Saline to mechanically move any post nasal drip from your eustachian tube or from the back of your throat.    Use warm salt water gargles to ease your throat pain. Warm salt water gargles as needed for sore throat- 1/2 tsp salt to 1 cup warm water, gargle as desired.    Use an antihistamine such as Claritin, Zyrtec or Allegra to dry you out.    Use pseudoephedrine (behind the counter) to decongest. Pseudoephedrine 30 mg up to 240 mg /day. It can raise your blood pressure and give you palpitations.    Use mucinex (guaifenesin) to break up mucous up to 2400mg/day to loosen any mucous.    The mucinex DM pill has a cough suppressant that can be sedating. It can be used at night to stop the tickle at the back of your throat.    You can use Mucinex D (it has guaifenesin and a high dose of pseudoephedrine) in the mornings to help decongest.    Use Afrin in each nare for no longer than 3 days, as it is addictive. It can also dry out your mucous membranes and cause elevated blood pressure. This is especially useful if you are flying.    Use Flonase 1-2 sprays/nostril per day. It is a local acting steroid nasal spray, if you develop a bloody nose, stop using the medication immediately.    Sometimes Nyquil at night is beneficial to help you get some rest, however it is sedating and it  does have an antihistamine, and tylenol.    Honey is a natural cough suppressant that can be used.    Tylenol up to 4,000 mg a day is safe for short periods and can be used for body aches, pain, and fever. However in high doses and prolonged use it can cause liver irritation.    Ibuprofen is a non-steroidal anti-inflammatory that can be used for body aches, pain, and fever.However it can also cause stomach irritation if over used.     Follow up with your PCP or specialty clinic as instructed in the next 2-3 days if not improved or as needed. You can call [phone] to schedule an appointment with appropriate provider.      If you condition worsens, we recommend that you receive another evaluation at the emergency room immediately or contact your primary medical clinic's after hours call service to discuss your concerns.      Please return here or go to the Emergency Department for any concerns or worsening condition.   You can also call [phone] to schedule an appointment with the appropriate provider.    Please return here or go to the Emergency Department for any concerns or worsening of condition.    Thank you for choosing [facility]!    Our goal in the Urgent Care is to always provide outstanding medical care. You may receive a survey by mail or e-mail in the next week regarding your experience today. We would greatly appreciate you completing and returning the survey. Your feedback provides us with a way to recognize our staff who provide very good care, and it helps us learn how to improve when your experience was below our aspiration of excellence.      We appreciate you trusting us with your medical care. We hope you feel better soon. We will be happy to take care of you for all of your future medical needs.    Sincerely,    PETRONA Hayes-ZAKIA      Back Pain Discharge Instructions    Alternate heat and ice for first 48 hours then  apply heat. You may do gently stretching if  tolerable.    Moist warm compresss to area several times daily.  May use a heating pad on LOW to provide heat over a towel which was dampended with warm water. DO NOT FALL ASLEEP WITH HEATING PAD ON.  Do not stay in one position to long.  When sleeping on your back place a pillow under knees to reduce tension on back.  If sleeping on your side, place pillow between knees to keep spine in better alinement.  Wear supportive shoes such as tennis shoes for support of the lower back.  Take any medication as directed.    If you were not prescribed an anti-inflammatory medication, and if you do not have any history of stomach/intestinal ulcers, or kidney disease, or are not taking a blood thinner such as Coumadin, Plavix, Pradaxa, Eloquis, or Xaralta for example, it is OK to take over the counter Ibuprofen or Advil or Motrin or Aleve as directed.  Do not take these medications on an empty stomach.    If you were prescribed a narcotic medication, do not drive or operate heavy equipment or machinery while taking these medications.    If you lose control of your bowel and/or bladder; lose sensation in between your legs by your genitalia and/or rectum or  lose control or sensation of any extremity, please go to the nearest Emergency Department immediately.    robaxin Warning:   The medication you have been prescribed may cause drowsiness and impair your judgement.  Therefore, you should avoid driving, climbing, using machinery, etc., so as not to increase your risk of injury.  Do NOT drink any alcohol while on this medication(s).

## 2023-08-15 NOTE — PROGRESS NOTES
Subjective:      Patient ID: Octavia Arrington is a 63 y.o. female.    Vitals:  vitals were not taken for this visit.     Chief Complaint: No chief complaint on file.    Pt is here today for upper left sided  back pain which radiates to the left breast. Pt states she had a fall on 8-4-23. Pt states she was seen when the incident first happened and was prescribed Robaxin. Pt states she doesn't feel any better.        Fall  The accident occurred More than 1 week ago. The fall occurred while walking. She landed on Carpet. The pain is present in the back. The pain is at a severity of 10/10. The symptoms are aggravated by ambulation and movement. Pertinent negatives include no numbness or tingling.     Neurological:  Negative for numbness.    Objective:     Physical Exam    Assessment:     No diagnosis found.    Plan:       There are no diagnoses linked to this encounter.

## 2023-08-15 NOTE — PROGRESS NOTES
"Subjective:      Patient ID: Octavia Arrington is a 63 y.o. female.    Vitals:  height is 5' 6" (1.676 m) and weight is 116.1 kg (256 lb). Her oral temperature is 98.4 °F (36.9 °C). Her blood pressure is 142/86 (abnormal) and her pulse is 83. Her respiration is 20 and oxygen saturation is 98%.     Chief Complaint: Fall    Pt states that she is coming in for a fall injury on 8/13/2023. Pt says that her pain is located in her neck and chest bone. Pt says that her room floor was wet that's when she sipped and fell.  Pt pain level is a 7. Pt self treated with the meds that was given.   Provider note begins below:  Past Medical History:  8/15/2023: Acromioclavicular joint arthritis  No date: Cataract  No date: Glaucoma  No date: Hypertension  No date: Uveitis   Pt with above pmh presents with c/o neck stiffness, s/p fall 8/13 at night at home, she went to [facility], had ct head and neck with normal results. She needed staples to her scalp, reports that is healing well. She also reports she had a fall in [place] 7/27, tripped on a yoga mat. She was given hydrocodone from ED.     Fall  Incident onset: 2 days ago. The fall occurred while walking. She fell from a height of 1 to 2 ft. She landed on Hard floor. The point of impact was the head. The pain is present in the head and neck. The pain is at a severity of 7/10. The pain is moderate. Associated symptoms include headaches. Pertinent negatives include no abdominal pain, bowel incontinence, fever, numbness or tingling. She has tried acetaminophen for the symptoms. The treatment provided no relief.   Back Pain  This is a new problem. The current episode started in the past 7 days. The pain is at a severity of 8/10. Associated symptoms include headaches. Pertinent negatives include no abdominal pain, bladder incontinence, bowel incontinence, chest pain, dysuria, fever, leg pain, numbness, paresis, paresthesias, pelvic pain, perianal numbness, tingling, weakness or weight loss. She " has tried analgesics for the symptoms.       Constitution: Negative for activity change, appetite change, chills, sweating, fatigue, fever, unexpected weight change and generalized weakness.   Cardiovascular:  Negative for chest pain, leg swelling, palpitations and sob on exertion.   Gastrointestinal:  Negative for abdominal pain and bowel incontinence.   Genitourinary:  Negative for dysuria, frequency, urgency, urine decreased, flank pain, bladder incontinence and pelvic pain.   Musculoskeletal:  Positive for pain, trauma, back pain, muscle cramps and muscle ache. Negative for joint pain, joint swelling, abnormal ROM of joint, arthritis, gout and history of spine disorder.   Neurological:  Positive for headaches. Negative for numbness.      Objective:     Physical Exam   Constitutional: She is oriented to person, place, and time. She appears well-developed.  Non-toxic appearance. She does not appear ill. No distress.   HENT:   Head: Normocephalic and atraumatic.   Ears:   Right Ear: External ear normal.   Left Ear: External ear normal.   Nose: Nose normal.   Mouth/Throat: Oropharynx is clear and moist.   Eyes: Conjunctivae, EOM and lids are normal. Pupils are equal, round, and reactive to light.   Neck: Trachea normal and phonation normal. Neck supple. No crepitus. There are no signs of injury. No torticollis present. No edema present. No erythema present. No neck rigidity present. No decreased range of motion present. pain with movement present.   Pulmonary/Chest: Effort normal and breath sounds normal. Chest wall is not dull to percussion. She exhibits no mass, no tenderness, no bony tenderness, no laceration and no retraction.   Musculoskeletal: Normal range of motion.         General: Normal range of motion.      Cervical back: She exhibits spasm. She exhibits no tenderness, no bony tenderness, no swelling, no deformity and no laceration.      Comments: Paraspinal spasms, no midline spine tenderness, shoulder  spasms. Pain reproduced with ROM.    Neurological: no focal deficit. She is alert and oriented to person, place, and time. She has normal motor skills, normal sensation and intact cranial nerves (2-12). Gait and coordination normal.   Skin: Skin is warm, dry, intact and not diaphoretic.        Psychiatric: Her speech is normal and behavior is normal. Judgment and thought content normal.   Nursing note and vitals reviewed.      Assessment:     1. Muscle spasms of neck    2. DDD (degenerative disc disease), cervical    3. Fall, subsequent encounter        Plan:     Patient will return here for staple removal.  She has an appointment with new PCP in September, did place a referral to back and spine for cervical degenerative disc disease.  Will try Robaxin for muscle spasms, lidocaine patches, Epson salt soaks, Biofreeze.  Return to work note provided.    Rtw note provided    Discussed results/diagnosis/plan with patient in clinic. Strict precautions given to patient to monitor for worsening signs and symptoms. Advised to follow up with PCP or specialist.    Explained side effects of medications prescribed with patient and informed him/her to discontinue use if he/she has any side effects and to inform UC or PCP if this occurs. All questions answered. Strict ED verses clinic return precautions stressed and given in depth. Advised if symptoms worsens of fail to improve he/she should go to the Emergency Room. Discharge and follow-up instructions given verbally/printed with the patient who expressed understanding and willingness to comply with my recommendations. Patient voiced understanding and in agreement with current treatment plan. Patient exits the exam room in no acute distress. Conversant and engaged during discharge discussion, verbalized understanding.        Muscle spasms of neck  -     methocarbamoL (ROBAXIN) 500 MG Tab; Take 1 tablet (500 mg total) by mouth 4 (four) times daily as needed (muscle spasm).   Dispense: 40 tablet; Refill: 0  -     ibuprofen (ADVIL,MOTRIN) 600 MG tablet; Take 1 tablet (600 mg total) by mouth every 8 (eight) hours as needed for Pain (as needed for pain, please take with food).  Dispense: 30 tablet; Refill: 0  -     LIDOcaine (LIDODERM) 5 %; Place 2 patches onto the skin once daily. Remove & Discard patch within 12 hours or as directed by MD for 7 days  Dispense: 14 patch; Refill: 0    DDD (degenerative disc disease), cervical  -     Ambulatory referral/consult to Back [facility] Spine Clinic    Fall, subsequent encounter                Patient Instructions   General Discharge Instructions   PLEASE READ YOUR DISCHARGE INSTRUCTIONS ENTIRELY AS IT CONTAINS IMPORTANT INFORMATION.  If you were prescribed a narcotic or controlled medication, do not drive or operate heavy equipment or machinery while taking these medications.  If you were prescribed antibiotics, please take them to completion.  You must understand that you've received an Urgent Care treatment only and that you may be released before all your medical problems are known or treated. You, the patient, will arrange for follow up care as instructed.    OVER THE COUNTER RECOMMENDATIONS/SUGGESTIONS.    Make sure to stay well hydrated.    Use Nasal Saline to mechanically move any post nasal drip from your eustachian tube or from the back of your throat.    Use warm salt water gargles to ease your throat pain. Warm salt water gargles as needed for sore throat- 1/2 tsp salt to 1 cup warm water, gargle as desired.    Use an antihistamine such as Claritin, Zyrtec or Allegra to dry you out.    Use pseudoephedrine (behind the counter) to decongest. Pseudoephedrine 30 mg up to 240 mg /day. It can raise your blood pressure and give you palpitations.    Use mucinex (guaifenesin) to break up mucous up to 2400mg/day to loosen any mucous.    The mucinex DM pill has a cough suppressant that can be sedating. It can be used at night to stop the tickle at the back  of your throat.    You can use Mucinex D (it has guaifenesin and a high dose of pseudoephedrine) in the mornings to help decongest.    Use Afrin in each nare for no longer than 3 days, as it is addictive. It can also dry out your mucous membranes and cause elevated blood pressure. This is especially useful if you are flying.    Use Flonase 1-2 sprays/nostril per day. It is a local acting steroid nasal spray, if you develop a bloody nose, stop using the medication immediately.    Sometimes Nyquil at night is beneficial to help you get some rest, however it is sedating and it does have an antihistamine, and tylenol.    Honey is a natural cough suppressant that can be used.    Tylenol up to 4,000 mg a day is safe for short periods and can be used for body aches, pain, and fever. However in high doses and prolonged use it can cause liver irritation.    Ibuprofen is a non-steroidal anti-inflammatory that can be used for body aches, pain, and fever.However it can also cause stomach irritation if over used.     Follow up with your PCP or specialty clinic as instructed in the next 2-3 days if not improved or as needed. You can call [phone] to schedule an appointment with appropriate provider.      If you condition worsens, we recommend that you receive another evaluation at the emergency room immediately or contact your primary medical clinic's after hours call service to discuss your concerns.      Please return here or go to the Emergency Department for any concerns or worsening condition.   You can also call [phone] to schedule an appointment with the appropriate provider.    Please return here or go to the Emergency Department for any concerns or worsening of condition.    Thank you for choosing [facility]!    Our goal in the Urgent Care is to always provide outstanding medical care. You may receive a survey by mail or e-mail in the next week regarding your experience today. We would greatly  appreciate you completing and returning the survey. Your feedback provides us with a way to recognize our staff who provide very good care, and it helps us learn how to improve when your experience was below our aspiration of excellence.      We appreciate you trusting us with your medical care. We hope you feel better soon. We will be happy to take care of you for all of your future medical needs.    Sincerely,    KAREN Hayes      Back Pain Discharge Instructions    Alternate heat and ice for first 48 hours then  apply heat. You may do gently stretching if tolerable.    Moist warm compresss to area several times daily.  May use a heating pad on LOW to provide heat over a towel which was dampended with warm water. DO NOT FALL ASLEEP WITH HEATING PAD ON.  Do not stay in one position to long.  When sleeping on your back place a pillow under knees to reduce tension on back.  If sleeping on your side, place pillow between knees to keep spine in better alinement.  Wear supportive shoes such as tennis shoes for support of the lower back.  Take any medication as directed.    If you were not prescribed an anti-inflammatory medication, and if you do not have any history of stomach/intestinal ulcers, or kidney disease, or are not taking a blood thinner such as Coumadin, Plavix, Pradaxa, Eloquis, or Xaralta for example, it is OK to take over the counter Ibuprofen or Advil or Motrin or Aleve as directed.  Do not take these medications on an empty stomach.    If you were prescribed a narcotic medication, do not drive or operate heavy equipment or machinery while taking these medications.    If you lose control of your bowel and/or bladder; lose sensation in between your legs by your genitalia and/or rectum or  lose control or sensation of any extremity, please go to the nearest Emergency Department immediately.    robaxin Warning:   The medication you have been prescribed may cause drowsiness and impair your judgement.   Therefore, you should avoid driving, climbing, using machinery, etc., so as not to increase your risk of injury.  Do NOT drink any alcohol while on this medication(s).

## 2023-08-16 RX ORDER — LIDOCAINE 50 MG/G
2 PATCH TOPICAL DAILY
Qty: 14 PATCH | Refills: 0 | Status: SHIPPED | OUTPATIENT
Start: 2023-08-16 | End: 2023-08-23

## 2023-08-20 ENCOUNTER — CLINICAL SUPPORT (OUTPATIENT)
Dept: URGENT CARE | Facility: CLINIC | Age: 63
End: 2023-08-20
Payer: COMMERCIAL

## 2023-08-20 VITALS
WEIGHT: 256 LBS | BODY MASS INDEX: 41.14 KG/M2 | DIASTOLIC BLOOD PRESSURE: 86 MMHG | RESPIRATION RATE: 18 BRPM | HEART RATE: 71 BPM | OXYGEN SATURATION: 95 % | HEIGHT: 66 IN | TEMPERATURE: 98 F | SYSTOLIC BLOOD PRESSURE: 146 MMHG

## 2023-08-20 DIAGNOSIS — Z48.02 ENCOUNTER FOR STAPLE REMOVAL: Primary | ICD-10-CM

## 2023-08-20 PROCEDURE — 99499 UNLISTED E&M SERVICE: CPT | Mod: S$GLB,,, | Performed by: NURSE PRACTITIONER

## 2023-08-20 PROCEDURE — 99499 NO LOS: ICD-10-PCS | Mod: S$GLB,,, | Performed by: NURSE PRACTITIONER

## 2023-08-20 PROCEDURE — 99024 POSTOP FOLLOW-UP VISIT: CPT | Mod: S$GLB,,, | Performed by: NURSE PRACTITIONER

## 2023-08-20 PROCEDURE — 99024 SUTURE REMOVAL: ICD-10-PCS | Mod: S$GLB,,, | Performed by: NURSE PRACTITIONER

## 2023-08-20 NOTE — PROCEDURES
Suture Removal    Date/Time: 8/20/2023 11:45 AM  Location procedure was performed: [facility] EMERGENCY DEPARTMENT    Performed by: Ramses Dahl NP  Authorized by: Ramses Dahl NP  Pre-operative diagnosis: lac  Post-operative diagnosis: lac  Wound Appearance: clean, well healed, normal color, nontender, no drainage and nonpurulent  Staples Removed: 6  Post-removal: antibiotic ointment applied  Comments: Patient tolerated procedure well with no complication

## 2023-08-20 NOTE — PROGRESS NOTES
"Subjective:      Patient ID: Octavia Arrington is a 63 y.o. female.    Vitals:  height is 5' 6" (1.676 m) and weight is 116.1 kg (256 lb). Her oral temperature is 98 °F (36.7 °C). Her blood pressure is 146/86 (abnormal) and her pulse is 71. Her respiration is 18 and oxygen saturation is 95%.     Chief Complaint: Suture / Staple Removal    Pt states that she has staple in her head 7 days ago and is here to have them removed .     Patient states no fever, no drainage, no tenderness, no other complications.    Suture / Staple Removal  The sutures were placed 7 to 10 days ago. She tried antibiotic ointment use since the wound repair. The treatment provided significant relief. There has been no drainage from the wound. There is no redness present. There is no swelling present. There is no pain present.       Skin: Negative.         Staple removed        Objective:     Physical Exam   HENT:   Head: Head is with laceration.           Assessment:     1. Encounter for staple removal        Plan:       Encounter for staple removal  -     Suture Removal    Mupirocin applied to laceration prior to discharge.                "

## 2023-09-18 ENCOUNTER — OFFICE VISIT (OUTPATIENT)
Dept: FAMILY MEDICINE | Facility: CLINIC | Age: 63
End: 2023-09-18
Payer: COMMERCIAL

## 2023-09-18 VITALS
HEART RATE: 91 BPM | HEIGHT: 65 IN | SYSTOLIC BLOOD PRESSURE: 118 MMHG | BODY MASS INDEX: 40.77 KG/M2 | TEMPERATURE: 98 F | OXYGEN SATURATION: 98 % | WEIGHT: 244.69 LBS | DIASTOLIC BLOOD PRESSURE: 68 MMHG

## 2023-09-18 DIAGNOSIS — Z00.00 ANNUAL PHYSICAL EXAM: Primary | ICD-10-CM

## 2023-09-18 PROCEDURE — 3008F BODY MASS INDEX DOCD: CPT | Mod: CPTII,S$GLB,, | Performed by: INTERNAL MEDICINE

## 2023-09-18 PROCEDURE — 1159F PR MEDICATION LIST DOCUMENTED IN MEDICAL RECORD: ICD-10-PCS | Mod: CPTII,S$GLB,, | Performed by: INTERNAL MEDICINE

## 2023-09-18 PROCEDURE — 3074F PR MOST RECENT SYSTOLIC BLOOD PRESSURE < 130 MM HG: ICD-10-PCS | Mod: CPTII,S$GLB,, | Performed by: INTERNAL MEDICINE

## 2023-09-18 PROCEDURE — 99999 PR PBB SHADOW E&M-EST. PATIENT-LVL III: CPT | Mod: PBBFAC,,, | Performed by: INTERNAL MEDICINE

## 2023-09-18 PROCEDURE — 99999 PR PBB SHADOW E&M-EST. PATIENT-LVL III: ICD-10-PCS | Mod: PBBFAC,,, | Performed by: INTERNAL MEDICINE

## 2023-09-18 PROCEDURE — 90471 IMMUNIZATION ADMIN: CPT | Mod: S$GLB,,, | Performed by: INTERNAL MEDICINE

## 2023-09-18 PROCEDURE — 3074F SYST BP LT 130 MM HG: CPT | Mod: CPTII,S$GLB,, | Performed by: INTERNAL MEDICINE

## 2023-09-18 PROCEDURE — 3078F PR MOST RECENT DIASTOLIC BLOOD PRESSURE < 80 MM HG: ICD-10-PCS | Mod: CPTII,S$GLB,, | Performed by: INTERNAL MEDICINE

## 2023-09-18 PROCEDURE — 90686 FLU VACCINE (QUAD) GREATER THAN OR EQUAL TO 3YO PRESERVATIVE FREE IM: ICD-10-PCS | Mod: S$GLB,,, | Performed by: INTERNAL MEDICINE

## 2023-09-18 PROCEDURE — 90471 FLU VACCINE (QUAD) GREATER THAN OR EQUAL TO 3YO PRESERVATIVE FREE IM: ICD-10-PCS | Mod: S$GLB,,, | Performed by: INTERNAL MEDICINE

## 2023-09-18 PROCEDURE — 1159F MED LIST DOCD IN RCRD: CPT | Mod: CPTII,S$GLB,, | Performed by: INTERNAL MEDICINE

## 2023-09-18 PROCEDURE — 90686 IIV4 VACC NO PRSV 0.5 ML IM: CPT | Mod: S$GLB,,, | Performed by: INTERNAL MEDICINE

## 2023-09-18 PROCEDURE — 3008F PR BODY MASS INDEX (BMI) DOCUMENTED: ICD-10-PCS | Mod: CPTII,S$GLB,, | Performed by: INTERNAL MEDICINE

## 2023-09-18 PROCEDURE — 3078F DIAST BP <80 MM HG: CPT | Mod: CPTII,S$GLB,, | Performed by: INTERNAL MEDICINE

## 2023-09-18 PROCEDURE — 99386 PR PREVENTIVE VISIT,NEW,40-64: ICD-10-PCS | Mod: 25,S$GLB,, | Performed by: INTERNAL MEDICINE

## 2023-09-18 PROCEDURE — 99386 PREV VISIT NEW AGE 40-64: CPT | Mod: 25,S$GLB,, | Performed by: INTERNAL MEDICINE

## 2023-09-18 NOTE — PROGRESS NOTES
SUBJECTIVE     Chief Complaint   Patient presents with    Establish Care       HPI  Octavia Arrington is a 63 y.o. female with multiple medical diagnoses as listed in the medical history and problem list that presents for annual exam. Pt has been doing well since her last visit. She has a good appetite and eats well. She does not exercise, but keeps physically active on the job. She sleeps for ~5-6 hours nightly. Pt does take OTC supplements, which is a MVI, turmeric, Airborne gummies, and B complex. She does not have any current stressors. Pt is UTD on age appropriate CA screening.    PAST MEDICAL HISTORY:  Past Medical History:   Diagnosis Date    Acromioclavicular joint arthritis 8/15/2023    Cataract     Glaucoma     Hypertension     Uveitis        PAST SURGICAL HISTORY:  Past Surgical History:   Procedure Laterality Date    BREAST BIOPSY      x 4    BUNIONECTOMY Right     PARTIAL HYSTERECTOMY      2000    REPAIR, HERNIA, INGUINAL Right     1975    ROTATOR CUFF REPAIR      SMALL INTESTINE SURGERY      TOTAL REPLACEMENT OF BOTH KNEES USING COMPUTER-ASSISTED NAVIGATION      R in 2011 and L in 2013       SOCIAL HISTORY:  Social History     Socioeconomic History    Marital status:    Tobacco Use    Smoking status: Never     Passive exposure: Never    Smokeless tobacco: Never   Substance and Sexual Activity    Alcohol use: Yes     Comment: occasionally    Drug use: Never    Sexual activity: Not Currently       FAMILY HISTORY:  Family History   Problem Relation Age of Onset    Pacemaker/defibrilator Mother     Cataracts Father     Prostate cancer Father     Heart disease Father     Amblyopia Neg Hx     Blindness Neg Hx     Glaucoma Neg Hx     Macular degeneration Neg Hx     Retinal detachment Neg Hx     Strabismus Neg Hx     Diabetes Neg Hx        ALLERGIES AND MEDICATIONS: updated and reviewed.  Review of patient's allergies indicates:   Allergen Reactions    Bactrim [sulfamethoxazole-trimethoprim] Hives, Swelling  "and Rash    Sulfa (sulfonamide antibiotics) Itching    Shellfish containing products Other (See Comments)     Mild itching    Iodine      Causes burning sensation on skin    Losartan Rash and Blisters     Causes lips to blister with a rash     Current Outpatient Medications   Medication Sig Dispense Refill    amLODIPine (NORVASC) 10 MG tablet Take 10 mg by mouth once daily.  2    hydroCHLOROthiazide (HYDRODIURIL) 25 MG tablet Take 25 mg by mouth.  2    HYDROcodone-acetaminophen (NORCO) 5-325 mg per tablet Take 1 tablet by mouth every 6 (six) hours as needed for Pain. 6 tablet 0    latanoprost (XALATAN) 0.005 % ophthalmic solution Place 1 drop into both eyes once daily. 7 mL 3     No current facility-administered medications for this visit.       ROS  Review of Systems   Constitutional:  Negative for chills and fever.   HENT:  Negative for hearing loss and sore throat.    Eyes:  Negative for visual disturbance.   Respiratory:  Negative for cough and shortness of breath.    Cardiovascular:  Positive for chest pain. Negative for palpitations and leg swelling.   Gastrointestinal:  Negative for abdominal pain, constipation, diarrhea, nausea and vomiting.   Genitourinary:  Negative for dysuria, frequency and urgency.   Musculoskeletal:  Positive for arthralgias (B/L shoulder) and neck pain. Negative for joint swelling and myalgias.   Skin:  Negative for rash and wound.   Neurological:  Positive for headaches.   Psychiatric/Behavioral:  Negative for agitation and confusion. The patient is not nervous/anxious.          OBJECTIVE     Physical Exam  Vitals:    09/18/23 1614   BP: 118/68   Pulse: 91   Temp: 98.1 °F (36.7 °C)    Body mass index is 40.72 kg/m².  Weight: 111 kg (244 lb 11.4 oz)   Height: 5' 5" (165.1 cm)     Physical Exam  Constitutional:       General: She is not in acute distress.     Appearance: She is well-developed.   HENT:      Head: Normocephalic and atraumatic.      Right Ear: External ear normal.      " Left Ear: External ear normal.      Nose: Nose normal.   Eyes:      General: No scleral icterus.        Right eye: No discharge.         Left eye: No discharge.      Conjunctiva/sclera: Conjunctivae normal.   Neck:      Vascular: No JVD.      Trachea: No tracheal deviation.   Cardiovascular:      Rate and Rhythm: Normal rate and regular rhythm.      Heart sounds: No murmur heard.     No friction rub. No gallop.   Pulmonary:      Effort: Pulmonary effort is normal. No respiratory distress.      Breath sounds: Normal breath sounds. No wheezing.   Abdominal:      General: Bowel sounds are normal. There is no distension.      Palpations: Abdomen is soft. There is no mass.      Tenderness: There is no abdominal tenderness. There is no guarding or rebound.   Musculoskeletal:         General: No tenderness or deformity. Normal range of motion.      Cervical back: Normal range of motion and neck supple.   Skin:     General: Skin is warm and dry.      Findings: No erythema or rash.   Neurological:      Mental Status: She is alert and oriented to person, place, and time.      Motor: No abnormal muscle tone.      Coordination: Coordination normal.   Psychiatric:         Behavior: Behavior normal.         Thought Content: Thought content normal.         Judgment: Judgment normal.           Health Maintenance         Date Due Completion Date    Hepatitis C Screening Never done ---    HIV Screening Never done ---    TETANUS VACCINE Never done ---    Mammogram Never done ---    Hemoglobin A1c (Diabetic Prevention Screening) Never done ---    Colorectal Cancer Screening Never done ---    Shingles Vaccine (1 of 2) Never done ---    COVID-19 Vaccine (4 - Pfizer series) 01/03/2022 11/8/2021    Lipid Panel 11/22/2027 11/22/2022              ASSESSMENT     63 y.o. female with     1. Annual physical exam    2. BMI 40.0-44.9, adult        PLAN:     1. Annual physical exam  - Counseled on age appropriate medical preventative services,  including age appropriate cancer screenings, over all nutritional health, need for a consistent exercise regimen and an over all push towards maintaining a vigorous and active lifestyle.  Counseled on age appropriate vaccines and discussed upcoming health care needs based on age/gender.  Spent time with patient counseling on need for a good patient/doctor relationship moving forward.  Discussed use of common OTC medications and supplements.  Discussed common dietary aids and use of caffeine and the need for good sleep hygiene and stress management.  - ELOINA completed as pt reportedly completed labs just 2 weeks ago    2. BMI 40.0-44.9, adult  - patient aware of importance of eating a prudent diet and exercising        RTC in 6 months    Do Nguyen MD  09/18/2023 4:42 PM        No follow-ups on file.

## 2023-09-19 ENCOUNTER — PATIENT OUTREACH (OUTPATIENT)
Dept: ADMINISTRATIVE | Facility: HOSPITAL | Age: 63
End: 2023-09-19
Payer: COMMERCIAL

## 2023-09-19 ENCOUNTER — TELEPHONE (OUTPATIENT)
Dept: FAMILY MEDICINE | Facility: CLINIC | Age: 63
End: 2023-09-19
Payer: COMMERCIAL

## 2023-09-19 DIAGNOSIS — R91.1 SOLITARY PULMONARY NODULE: ICD-10-CM

## 2023-09-19 DIAGNOSIS — N32.81 OAB (OVERACTIVE BLADDER): Primary | ICD-10-CM

## 2023-09-19 RX ORDER — OXYBUTYNIN CHLORIDE 10 MG/1
10 TABLET, EXTENDED RELEASE ORAL NIGHTLY
Qty: 30 TABLET | Refills: 0 | Status: SHIPPED | OUTPATIENT
Start: 2023-09-19 | End: 2023-12-12

## 2023-09-19 NOTE — TELEPHONE ENCOUNTER
I returned the phone call to the pt today and she told me she also want to talk about OAB on yesterday.  I informed her that I would send a prescription for Ditropan to her pharmacy for a trial course.  Patient also noted she was recently seen in the ED and noted to have an abnormality on her chest, so would need a follow-up CT scan for further eval. CT scan ordered.  Finally, patient reports she is awaiting a referral to the back and spine clinic.  Patient advised to reach out to that provider via [employer] for updates on that referral.  All questions/concerns addressed and patient voiced understanding.

## 2023-09-19 NOTE — PROGRESS NOTES
Health Maintenance Due   Topic Date Due    Hepatitis C Screening  Never done    HIV Screening  Never done    TETANUS VACCINE  Never done    Hemoglobin A1c (Diabetic Prevention Screening)  Never done    Colorectal Cancer Screening  Never done    Shingles Vaccine (1 of 2) Never done    COVID-19 Vaccine (4 - Pfizer series) 01/03/2022    Mammogram  09/30/2023     Chart review done.  updated. Immunizations reviewed & updated. Care Everywhere updated.

## 2023-09-23 ENCOUNTER — PATIENT MESSAGE (OUTPATIENT)
Dept: ADMINISTRATIVE | Facility: HOSPITAL | Age: 63
End: 2023-09-23
Payer: COMMERCIAL

## 2023-10-19 ENCOUNTER — OFFICE VISIT (OUTPATIENT)
Dept: URGENT CARE | Facility: CLINIC | Age: 63
End: 2023-10-19
Payer: COMMERCIAL

## 2023-10-19 VITALS
RESPIRATION RATE: 17 BRPM | BODY MASS INDEX: 40.65 KG/M2 | SYSTOLIC BLOOD PRESSURE: 136 MMHG | DIASTOLIC BLOOD PRESSURE: 88 MMHG | TEMPERATURE: 98 F | WEIGHT: 244 LBS | HEIGHT: 65 IN | HEART RATE: 84 BPM | OXYGEN SATURATION: 98 %

## 2023-10-19 DIAGNOSIS — R09.82 POST-NASAL DRIP: ICD-10-CM

## 2023-10-19 DIAGNOSIS — M50.30 DDD (DEGENERATIVE DISC DISEASE), CERVICAL: ICD-10-CM

## 2023-10-19 DIAGNOSIS — B96.89 BACTERIAL SINUSITIS: Primary | ICD-10-CM

## 2023-10-19 DIAGNOSIS — Z87.898 HX OF SHORTNESS OF BREATH: ICD-10-CM

## 2023-10-19 DIAGNOSIS — M62.838 MUSCLE SPASMS OF NECK: ICD-10-CM

## 2023-10-19 DIAGNOSIS — J02.9 SORE THROAT: ICD-10-CM

## 2023-10-19 DIAGNOSIS — R05.8 OTHER COUGH: ICD-10-CM

## 2023-10-19 DIAGNOSIS — J32.9 BACTERIAL SINUSITIS: Primary | ICD-10-CM

## 2023-10-19 PROCEDURE — 96372 THER/PROPH/DIAG INJ SC/IM: CPT | Mod: S$GLB,,, | Performed by: FAMILY MEDICINE

## 2023-10-19 PROCEDURE — 99213 PR OFFICE/OUTPT VISIT, EST, LEVL III, 20-29 MIN: ICD-10-PCS | Mod: 25,S$GLB,, | Performed by: PHYSICIAN ASSISTANT

## 2023-10-19 PROCEDURE — 96372 PR INJECTION,THERAP/PROPH/DIAG2ST, IM OR SUBCUT: ICD-10-PCS | Mod: S$GLB,,, | Performed by: FAMILY MEDICINE

## 2023-10-19 PROCEDURE — 99213 OFFICE O/P EST LOW 20 MIN: CPT | Mod: 25,S$GLB,, | Performed by: PHYSICIAN ASSISTANT

## 2023-10-19 RX ORDER — ALBUTEROL SULFATE 90 UG/1
AEROSOL, METERED RESPIRATORY (INHALATION)
COMMUNITY
End: 2023-12-12

## 2023-10-19 RX ORDER — PROMETHAZINE HYDROCHLORIDE AND DEXTROMETHORPHAN HYDROBROMIDE 6.25; 15 MG/5ML; MG/5ML
5 SYRUP ORAL EVERY 6 HOURS PRN
Qty: 180 ML | Refills: 0 | Status: SHIPPED | OUTPATIENT
Start: 2023-10-19 | End: 2023-10-28

## 2023-10-19 RX ORDER — DEXAMETHASONE SODIUM PHOSPHATE 100 MG/10ML
10 INJECTION INTRAMUSCULAR; INTRAVENOUS ONCE
Status: COMPLETED | OUTPATIENT
Start: 2023-10-19 | End: 2023-10-19

## 2023-10-19 RX ORDER — AMOXICILLIN AND CLAVULANATE POTASSIUM 875; 125 MG/1; MG/1
1 TABLET, FILM COATED ORAL EVERY 12 HOURS
Qty: 20 TABLET | Refills: 0 | Status: SHIPPED | OUTPATIENT
Start: 2023-10-19 | End: 2023-10-29

## 2023-10-19 RX ORDER — FLUTICASONE PROPIONATE 50 MCG
2 SPRAY, SUSPENSION (ML) NASAL DAILY PRN
Qty: 15.8 ML | Refills: 0 | Status: SHIPPED | OUTPATIENT
Start: 2023-10-19 | End: 2023-11-18

## 2023-10-19 RX ORDER — CETIRIZINE HYDROCHLORIDE 10 MG/1
1 TABLET ORAL DAILY
COMMUNITY
End: 2023-12-12

## 2023-10-19 RX ADMIN — DEXAMETHASONE SODIUM PHOSPHATE 10 MG: 100 INJECTION INTRAMUSCULAR; INTRAVENOUS at 05:10

## 2023-10-19 NOTE — PROGRESS NOTES
"Subjective:      Patient ID: Octavia Arrington is a 63 y.o. female.    Vitals:  height is 5' 5" (1.651 m) and weight is 110.7 kg (244 lb). Her oral temperature is 98.3 °F (36.8 °C). Her blood pressure is 136/88 and her pulse is 84. Her respiration is 17 and oxygen saturation is 98%.     Chief Complaint: Sinus Problem    Octavia Arrington is a 63 y.o. female with hypertension who complains of  postnasal drip, scratchy throat, hoarse voice, shortness of breath, sinus pressure and productive cough that has been going on for 7 days since Friday.  Patient has tried Zyrtec, albuterol inhaler, cough drops, and hot tea.  She is worried about pneumonia.Patient reports productive cough and dyspnea but pleuritic chest pain, chills, fatigue, night sweats, malaise, and anorexia.      Patient with hx of DDD with neck spasms; reports it is getting better since her visit in August 2023. She has tried methocarbamol and ibuprofen 600 mg prn pain.  She states lidoderm 5% wasn't covered; tried OTC lidocaine 4%  patches with mild improvement. She did not get a referral.     Patient states she is able to tolerate steroid shots. She reports hx of severe headache when she was given steroid shots too close together.     Sinus Problem  This is a new problem. The current episode started in the past 7 days. The problem is unchanged. There has been no fever. Her pain is at a severity of 0/10. She is experiencing no pain. Associated symptoms include congestion, coughing, ear pain, headaches, a hoarse voice, shortness of breath, sinus pressure, a sore throat and swollen glands. Pertinent negatives include no chills, diaphoresis, neck pain or sneezing. Treatments tried: Zyrtec, inhaler, cough drops, and hot tea. The treatment provided mild relief.       Constitution: Negative for chills, sweating and fever.   HENT:  Positive for ear pain, congestion, postnasal drip, sinus pain, sinus pressure, sore throat and voice change. Negative for hearing loss and " trouble swallowing.    Neck: Positive for degenerative disc disease. Negative for neck pain, neck stiffness and neck swelling.   Cardiovascular:  Positive for sob on exertion. Negative for chest trauma, chest pain, leg swelling and palpitations.   Eyes:  Negative for eye discharge and eye redness.   Respiratory:  Positive for cough, sputum production, shortness of breath and wheezing. Negative for sleep apnea, chest tightness, bloody sputum, COPD, stridor and asthma.    Gastrointestinal:  Negative for abdominal pain, nausea and vomiting.   Genitourinary:  Negative for dysuria, frequency and urgency.   Musculoskeletal:  Negative for back pain, muscle cramps and muscle ache.   Skin:  Negative for pale.   Allergic/Immunologic: Positive for environmental allergies and seasonal allergies. Negative for asthma, recurrent sinus infections and sneezing.   Neurological:  Positive for headaches.   Psychiatric/Behavioral:  Negative for nervous/anxious. The patient is not nervous/anxious.       Objective:     Physical Exam   Constitutional: She is oriented to person, place, and time. She appears well-developed. She is cooperative. normal  HENT:   Head: Normocephalic and atraumatic.   Ears:   Right Ear: Hearing, tympanic membrane, external ear and ear canal normal.   Left Ear: Hearing, tympanic membrane, external ear and ear canal normal.   Nose: Mucosal edema, rhinorrhea, sinus tenderness and congestion present. No nasal deformity. No epistaxis. Right sinus exhibits maxillary sinus tenderness and frontal sinus tenderness. Left sinus exhibits maxillary sinus tenderness and frontal sinus tenderness.   Mouth/Throat: Uvula is midline, oropharynx is clear and moist and mucous membranes are normal. Mucous membranes are moist. No trismus in the jaw. Normal dentition. No uvula swelling. Oropharynx is clear.      Comments:  postnasal discharge noted on the posterior pharyngeal wall    Eyes: Conjunctivae and lids are normal. Pupils are  equal, round, and reactive to light. Extraocular movement intact   Neck: Trachea normal and phonation normal. Neck supple. No neck rigidity present.   Cardiovascular: Normal rate, regular rhythm, normal heart sounds and normal pulses.   Pulmonary/Chest: Effort normal and breath sounds normal. She has no wheezes. She has no rhonchi. She has no rales.   Abdominal: Normal appearance.   Musculoskeletal: Normal range of motion.         General: No swelling or tenderness. Normal range of motion.   Lymphadenopathy:     She has cervical adenopathy.   Neurological: She is alert and oriented to person, place, and time. She exhibits normal muscle tone.   Skin: Skin is warm, dry and intact.   Psychiatric: Her speech is normal and behavior is normal. Mood, judgment and thought content normal.   Nursing note and vitals reviewed.      Assessment:     1. Bacterial sinusitis    2. Post-nasal drip    3. Hx of shortness of breath    4. Sore throat    5. Other cough    6. Muscle spasms of neck    7. DDD (degenerative disc disease), cervical        Patient presents with clinical exam findings and history consistent with above.      On exam, patient is nontoxic appearing and vitals are stable.      Diagnostic testing results were reviewed and discussed with patient/guardian.   Tests ordered in clinic: None  Previous progress notes/admissions/labs and medications were reviewed.      Plan:   Recommend oral antihistamine (ZYRTEC) +/- , steroid nasal spray (flonase), prescription (promethazine-DM) for night if it makes you drowsy/OTC cough medicine (Coricidin HBP® Maximum Strength Cold & Flu Day),  Tylenol (Acetaminophen) and/or Motrin (Ibuprofen) as directed for control of pain and/or fever.     Continue albuterol every 4 hours prn for SOB/wheezing     Bacterial sinusitis  -     amoxicillin-clavulanate 875-125mg (AUGMENTIN) 875-125 mg per tablet; Take 1 tablet by mouth every 12 (twelve) hours. for 10 days  Dispense: 20 tablet; Refill:  0    Post-nasal drip  -     fluticasone propionate (FLONASE) 50 mcg/actuation nasal spray; 2 sprays (100 mcg total) by Each Nostril route daily as needed for Rhinitis or Allergies.  Dispense: 15.8 mL; Refill: 0    Hx of shortness of breath  -     dexAMETHasone injection 10 mg    Sore throat  -     dexAMETHasone injection 10 mg    Other cough  -     promethazine-dextromethorphan (PROMETHAZINE-DM) 6.25-15 mg/5 mL Syrp; Take 5 mLs by mouth every 6 (six) hours as needed (cough).  Dispense: 180 mL; Refill: 0    Muscle spasms of neck  -     [facility]ulatory referral[facility]    DDD (degenerative disc disease), cervical  -     Ambulatory referral/consult [facility]             Additional MDM:     Heart Failure Score:   COPD = No        1) See orders for this visit as documented in the electronic medical record.  2) Symptomatic therapy suggested: use acetaminophen prn. For acute pain, rest, intermittent application of cold packs (later, may switch to heat, but do not sleep on heating pad), analgesics and muscle relaxants are recommended.   3) Call or return to clinic prn if these symptoms worsen or fail to improve as anticipated.    Discussed results/diagnosis/plan with patient in clinic.  We had shared decision making for patient's treatment. Patient verbalized understanding and in agreement with current treatment plan.     Patient was instructed to return for re-evaluation with urgent care or PCP for continued outpatient workup and management if symptoms do not improve/worsening symptoms. Strict ED versus clinic precautions given in depth.    Discharge and follow-up instructions given verbally/printed with the patient who expressed understanding. The instructions and results are also available on [facility].              Jennie Magallanes PA-C          Patient Instructions   Recommend oral antihistamine (ZYRTEC) +/- , steroid nasal spray (flonase), prescription (promethazine-DM) for night if it makes  you drowsy/OTC cough medicine (Coricidin HBP® Maximum Strength Cold & Flu Day),  Tylenol (Acetaminophen) and/or Motrin (Ibuprofen) as directed for control of pain and/or fever.    Continue albuterol every 4 hours prn for SOB/wheezing       If you were prescribed antibiotics, please take them to completion.  Please drink plenty of fluids.  Please get plenty of rest.  Nasal irrigation with a saline spray or Netti Pot like device per their directions is also recommended.  If you  smoke, please stop smoking.    To help ease a sore throat, you can:  Use a sore throat spray.  Suck on hard candy or throat lozenges.  Gargle with warm saltwater a few times each day. Mix of 1/4 teaspoon (1.25 grams) salt in 8 ounces (240 mL) of warm water.  Use a cool mist humidifier to help you breathe easier.    If you negative (-) for a COVID test today and you are continuing to have symptoms, it is recommended to repeat the test in 48 hours x 3. If you continue to be negative, you may return to school/work once you have improved symptoms and no fever for 24 hours without any medications. This applies to all viral illnesses.         Discussed prescriptions and over-the-counter medicines to help with patient's symptoms:  A steroid nose spray (flonase) can help with a stuffy nose. It can also help with drainage down the back of your throat.  An antihistamine (loratadine,zyrtec,allegra, xyzal) can help with itching, sneezing, or runny nose.  An antihistamine eye drop can help with itchy eyes.  A decongestant (pseudoephedrine,  Phenylephrine) can help with a stuffy nose. Take <10 days for congestion and rhinorrhea. Once symptoms improve, proceed with loratadine/zyrtec once a day. These ingredients can keep you up all night, decrease appetite, feel jittery, and raise blood pressure with long term use.  OTC Coricidin can be used for patients with hypertension and palpitations because you cannot use ingredients such as pseudoephedrine and  phenylephrine for oral decongestants.  Coricidin HBP Cough & Cold (Chlorpheniramine/Dextromethorphan)  Coricidin HBP Maximum Strength Multi-Symptom Flu  (Acetaminophen,Dextromethorphan, Chlorpheniramine)  Coricidin HBP® Maximum Strength Cold & Flu Day/Night (Acetaminophen,Dextromethorphan, Doxylamine, Guaifenesin)  Coricidin HBP Chest Congestion & Cough (Dextromethorphan + Guaifenesin)    Medications that control cough are suppressants and expectorants. Suppressants are tessalon pearls and dextromethorphan. If you have a productive cough with sputum, you need an expectorant called guaifenesin. Dextromethorphan and Guaifenesin are active ingredients in many OTC cough/cold medications such as Dayquil/Nyquil, Mucinex, and Robitussin Mucus+Chest Congestion.            Common Cold Medicine Ingredients Cheat sheet  Acetaminophen (APAP) -pain reliever/fever reducer  Dextromethorphan - cough suppressant  Guaifenesin - expectorant/thins and loosens mucus  Phenylephrine - nasal decongestant  Diphenhydramine or Doxylamine succinate - antihistamine, helps you fall asleep  Promethazine or Brompheniramine - Prescription strength antihistamines    These OTC cold medications are safe to use if you do not have high blood pressure (hypertension) or palpitations.  DayQuil and NyQuil - Cough, Cold & Flu Relief LiquiCaps  DayQuil: Acetaminophen, Dextromethorphan, and Phenylephrine   NyQuil: Acetaminophen, Dextromethorphan, and Doxylamine  DayQuil and NyQuil SEVERE Maximum Strength Cough, Cold & Flu Relief LiquiCaps  DAYQUIL: Acetaminophen, Dextromethorphan, Guaifenesin, and Phenylephrine  NYQUIL: Acetaminophen, Dextromethorphan, Doxylamine, and Phenylephrine   Mucinex DM: Guaifenesin,Dextromethorphan  Mucinex Maximum Strength Sinus-Max® Pressure, Pain & Cough Liquid Gels: Acetaminophen/Dextromethorphan/ Guaifenesin/Phenylephrine     If not allergic, please take over the counter Tylenol (Acetaminophen) and/or Motrin (Ibuprofen) as  directed for control of pain and/or fever.        Please remember that you have received care at an urgent care today. Urgent cares are not emergency rooms and are not equipped to handle life threatening emergencies and cannot rule in or out certain medical conditions and you may be released before all of your medical problems are known or treated.     Please arrange follow up with your primary care physician or speciality clinic within 2-5 days if your signs and symptoms have not resolved or worsen.     Patient can call our Referral Hotline at [phone] to make an appointment.      Please return here or go to the Emergency Department for any concerns or worsening of condition.  Signs of infection. These include a fever of 100.4°F (38°C) or higher, chills, cough, more sputum or change in color of sputum.  You are having so much trouble breathing that you can only say one or two words at a time.  You need to sit upright at all times to be able to breathe and or cannot lie down.  You have trouble breathing when talking or sitting still.  You have a fever of 100.4°F (38°C) or higher or chills.  You have chest pain when you cough, have trouble breathing but can still talk in full sentences, or cough up blood.

## 2023-10-19 NOTE — PATIENT INSTRUCTIONS
Recommend oral antihistamine (ZYRTEC) +/- , steroid nasal spray (flonase), prescription (promethazine-DM) for night if it makes you drowsy/OTC cough medicine (Coricidin HBP® Maximum Strength Cold & Flu Day),  Tylenol (Acetaminophen) and/or Motrin (Ibuprofen) as directed for control of pain and/or fever.    Continue albuterol every 4 hours prn for SOB/wheezing       If you were prescribed antibiotics, please take them to completion.  Please drink plenty of fluids.  Please get plenty of rest.  Nasal irrigation with a saline spray or Netti Pot like device per their directions is also recommended.  If you  smoke, please stop smoking.    To help ease a sore throat, you can:  Use a sore throat spray.  Suck on hard candy or throat lozenges.  Gargle with warm saltwater a few times each day. Mix of 1/4 teaspoon (1.25 grams) salt in 8 ounces (240 mL) of warm water.  Use a cool mist humidifier to help you breathe easier.    If you negative (-) for a COVID test today and you are continuing to have symptoms, it is recommended to repeat the test in 48 hours x 3. If you continue to be negative, you may return to school/work once you have improved symptoms and no fever for 24 hours without any medications. This applies to all viral illnesses.         Discussed prescriptions and over-the-counter medicines to help with patient's symptoms:  A steroid nose spray (flonase) can help with a stuffy nose. It can also help with drainage down the back of your throat.  An antihistamine (loratadine,zyrtec,allegra, xyzal) can help with itching, sneezing, or runny nose.  An antihistamine eye drop can help with itchy eyes.  A decongestant (pseudoephedrine,  Phenylephrine) can help with a stuffy nose. Take <10 days for congestion and rhinorrhea. Once symptoms improve, proceed with loratadine/zyrtec once a day. These ingredients can keep you up all night, decrease appetite, feel jittery, and raise blood pressure with long term use.  OTC Coricidin  can be used for patients with hypertension and palpitations because you cannot use ingredients such as pseudoephedrine and phenylephrine for oral decongestants.  Coricidin HBP Cough & Cold (Chlorpheniramine/Dextromethorphan)  Coricidin HBP Maximum Strength Multi-Symptom Flu  (Acetaminophen,Dextromethorphan, Chlorpheniramine)  Coricidin HBP® Maximum Strength Cold & Flu Day/Night (Acetaminophen,Dextromethorphan, Doxylamine, Guaifenesin)  Coricidin HBP Chest Congestion & Cough (Dextromethorphan + Guaifenesin)    Medications that control cough are suppressants and expectorants. Suppressants are tessalon pearls and dextromethorphan. If you have a productive cough with sputum, you need an expectorant called guaifenesin. Dextromethorphan and Guaifenesin are active ingredients in many OTC cough/cold medications such as Dayquil/Nyquil, Mucinex, and Robitussin Mucus+Chest Congestion.            Common Cold Medicine Ingredients Cheat sheet  Acetaminophen (APAP) -pain reliever/fever reducer  Dextromethorphan - cough suppressant  Guaifenesin - expectorant/thins and loosens mucus  Phenylephrine - nasal decongestant  Diphenhydramine or Doxylamine succinate - antihistamine, helps you fall asleep  Promethazine or Brompheniramine - Prescription strength antihistamines    These OTC cold medications are safe to use if you do not have high blood pressure (hypertension) or palpitations.  DayQuil and NyQuil - Cough, Cold & Flu Relief LiquiCaps  DayQuil: Acetaminophen, Dextromethorphan, and Phenylephrine   NyQuil: Acetaminophen, Dextromethorphan, and Doxylamine  DayQuil and NyQuil SEVERE Maximum Strength Cough, Cold & Flu Relief LiquiCaps  DAYQUIL: Acetaminophen, Dextromethorphan, Guaifenesin, and Phenylephrine  NYQUIL: Acetaminophen, Dextromethorphan, Doxylamine, and Phenylephrine   Mucinex DM: Guaifenesin,Dextromethorphan  Mucinex Maximum Strength Sinus-Max® Pressure, Pain & Cough Liquid Gels: Acetaminophen/Dextromethorphan/  Guaifenesin/Phenylephrine     If not allergic, please take over the counter Tylenol (Acetaminophen) and/or Motrin (Ibuprofen) as directed for control of pain and/or fever.        Please remember that you have received care at an urgent care today. Urgent cares are not emergency rooms and are not equipped to handle life threatening emergencies and cannot rule in or out certain medical conditions and you may be released before all of your medical problems are known or treated.     Please arrange follow up with your primary care physician or speciality clinic within 2-5 days if your signs and symptoms have not resolved or worsen.     Patient can call our Referral Hotline at [phone] to make an appointment.      Please return here or go to the Emergency Department for any concerns or worsening of condition.  Signs of infection. These include a fever of 100.4°F (38°C) or higher, chills, cough, more sputum or change in color of sputum.  You are having so much trouble breathing that you can only say one or two words at a time.  You need to sit upright at all times to be able to breathe and or cannot lie down.  You have trouble breathing when talking or sitting still.  You have a fever of 100.4°F (38°C) or higher or chills.  You have chest pain when you cough, have trouble breathing but can still talk in full sentences, or cough up blood.

## 2023-11-20 ENCOUNTER — CLINICAL SUPPORT (OUTPATIENT)
Dept: OTHER | Facility: CLINIC | Age: 63
End: 2023-11-20
Payer: COMMERCIAL

## 2023-11-20 DIAGNOSIS — Z00.8 ENCOUNTER FOR OTHER GENERAL EXAMINATION: ICD-10-CM

## 2023-11-20 PROCEDURE — 82947 ASSAY GLUCOSE BLOOD QUANT: CPT | Mod: QW,S$GLB,, | Performed by: INTERNAL MEDICINE

## 2023-11-20 PROCEDURE — 82947 PR  ASSAY QUANTITATIVE,BLOOD GLUCOSE: ICD-10-PCS | Mod: QW,S$GLB,, | Performed by: INTERNAL MEDICINE

## 2023-11-20 PROCEDURE — 80061 PR  LIPID PANEL: ICD-10-PCS | Mod: QW,S$GLB,, | Performed by: INTERNAL MEDICINE

## 2023-11-20 PROCEDURE — 99401 PREV MED CNSL INDIV APPRX 15: CPT | Mod: S$GLB,,, | Performed by: INTERNAL MEDICINE

## 2023-11-20 PROCEDURE — 80061 LIPID PANEL: CPT | Mod: QW,S$GLB,, | Performed by: INTERNAL MEDICINE

## 2023-11-20 PROCEDURE — 99401 PR PREVENT COUNSEL,INDIV,15 MIN: ICD-10-PCS | Mod: S$GLB,,, | Performed by: INTERNAL MEDICINE

## 2023-11-21 VITALS
BODY MASS INDEX: 41.32 KG/M2 | DIASTOLIC BLOOD PRESSURE: 79 MMHG | SYSTOLIC BLOOD PRESSURE: 130 MMHG | HEIGHT: 65 IN | WEIGHT: 248 LBS

## 2023-11-21 LAB
GLUCOSE SERPL-MCNC: 84 MG/DL (ref 60–140)
HDLC SERPL-MCNC: 71 MG/DL
POC CHOLESTEROL, LDL (DOCK): 59 MG/DL
POC CHOLESTEROL, TOTAL: 142 MG/DL
TRIGL SERPL-MCNC: 53 MG/DL

## 2023-12-12 ENCOUNTER — OFFICE VISIT (OUTPATIENT)
Dept: OPHTHALMOLOGY | Facility: CLINIC | Age: 63
End: 2023-12-12
Payer: COMMERCIAL

## 2023-12-12 DIAGNOSIS — H25.13 NUCLEAR SCLEROSIS OF BOTH EYES: ICD-10-CM

## 2023-12-12 DIAGNOSIS — H35.033 HYPERTENSIVE RETINOPATHY OF BOTH EYES, GRADE 1: ICD-10-CM

## 2023-12-12 DIAGNOSIS — H40.1131 PRIMARY OPEN ANGLE GLAUCOMA (POAG) OF BOTH EYES, MILD STAGE: Primary | ICD-10-CM

## 2023-12-12 PROCEDURE — 1160F PR REVIEW ALL MEDS BY PRESCRIBER/CLIN PHARMACIST DOCUMENTED: ICD-10-PCS | Mod: CPTII,S$GLB,, | Performed by: STUDENT IN AN ORGANIZED HEALTH CARE EDUCATION/TRAINING PROGRAM

## 2023-12-12 PROCEDURE — 92133 CPTRZD OPH DX IMG PST SGM ON: CPT | Mod: S$GLB,,, | Performed by: STUDENT IN AN ORGANIZED HEALTH CARE EDUCATION/TRAINING PROGRAM

## 2023-12-12 PROCEDURE — 99214 PR OFFICE/OUTPT VISIT, EST, LEVL IV, 30-39 MIN: ICD-10-PCS | Mod: S$GLB,,, | Performed by: STUDENT IN AN ORGANIZED HEALTH CARE EDUCATION/TRAINING PROGRAM

## 2023-12-12 PROCEDURE — 92020 GONIOSCOPY: CPT | Mod: S$GLB,,, | Performed by: STUDENT IN AN ORGANIZED HEALTH CARE EDUCATION/TRAINING PROGRAM

## 2023-12-12 PROCEDURE — 99214 OFFICE O/P EST MOD 30 MIN: CPT | Mod: S$GLB,,, | Performed by: STUDENT IN AN ORGANIZED HEALTH CARE EDUCATION/TRAINING PROGRAM

## 2023-12-12 PROCEDURE — 1159F MED LIST DOCD IN RCRD: CPT | Mod: CPTII,S$GLB,, | Performed by: STUDENT IN AN ORGANIZED HEALTH CARE EDUCATION/TRAINING PROGRAM

## 2023-12-12 PROCEDURE — 1159F PR MEDICATION LIST DOCUMENTED IN MEDICAL RECORD: ICD-10-PCS | Mod: CPTII,S$GLB,, | Performed by: STUDENT IN AN ORGANIZED HEALTH CARE EDUCATION/TRAINING PROGRAM

## 2023-12-12 PROCEDURE — 92133 POSTERIOR SEGMENT OCT OPTIC NERVE(OCULAR COHERENCE TOMOGRAPHY) - OU - BOTH EYES: ICD-10-PCS | Mod: S$GLB,,, | Performed by: STUDENT IN AN ORGANIZED HEALTH CARE EDUCATION/TRAINING PROGRAM

## 2023-12-12 PROCEDURE — 99999 PR PBB SHADOW E&M-EST. PATIENT-LVL III: CPT | Mod: PBBFAC,,, | Performed by: STUDENT IN AN ORGANIZED HEALTH CARE EDUCATION/TRAINING PROGRAM

## 2023-12-12 PROCEDURE — 1160F RVW MEDS BY RX/DR IN RCRD: CPT | Mod: CPTII,S$GLB,, | Performed by: STUDENT IN AN ORGANIZED HEALTH CARE EDUCATION/TRAINING PROGRAM

## 2023-12-12 PROCEDURE — 99999 PR PBB SHADOW E&M-EST. PATIENT-LVL III: ICD-10-PCS | Mod: PBBFAC,,, | Performed by: STUDENT IN AN ORGANIZED HEALTH CARE EDUCATION/TRAINING PROGRAM

## 2023-12-12 PROCEDURE — 92020 PR SPECIAL EYE EVAL,GONIOSCOPY: ICD-10-PCS | Mod: S$GLB,,, | Performed by: STUDENT IN AN ORGANIZED HEALTH CARE EDUCATION/TRAINING PROGRAM

## 2023-12-12 NOTE — PROGRESS NOTES
Subjective:       Patient ID: Octavia Arrington is a 63 y.o. female.    Chief Complaint: Glaucoma     HPI    DLS: 4/13/2022 Dr. Sloan    Pt presents today for glaucoma follow up. Pt states she need refill on   Latanoprost - haven't use drop in awhile and didn't use every night like   she was suppose to. She last used latanoprost in several months to a year.   She denies visual complaint.   Last edited by Hailey Sloan MD on 12/12/2023  4:45 PM.              Assessment & Plan   Primary open angle glaucoma (POAG) of both eyes, mild stage  -     Posterior Segment OCT Optic Nerve- Both eyes    Nuclear sclerosis of both eyes    Hypertensive retinopathy of both eyes, grade 1    Overdue. Last visit 4/13/22      POAG mild OU  -Fhx (-), Steroids (-),Trauma(+concussion)  -Drops: -Drop intolerance/contraindication: -  -Laser: -  -Surgeries: -  -CCT: 536 // 540  -Gonio: SS OU    1/22 HVF - Nonspecific non-contiguous defect inferiorly, may be early IAD OD // full OS  1/22 OCT-RNFL Low TI,  Borderline T OD // low TI,T borderline TS OS    12/23 RNFL B T/TI OD //  dec T/TI B TS OS --> stable almost 2 years.   FP 12/23    Patient has been on and off latanoprost and LTFU for 1.5 years. RNFL is stable today. Disc photos were obtained today and are in [place].   Will clinically correlate with updated HVF    Nuclear sclerosis OU  Not visually significant. Monitor.    HTN Retinopathy OU  BP control with PCP      PLAN  Monitor until updated visual field    RTC 3 months with HVF 24-2 , IOP check     Hailey Sloan M.D., M.S.  Department of Ophthalmology   Division of Glaucoma Surgery  [facility]

## 2024-01-04 ENCOUNTER — HOSPITAL ENCOUNTER (OUTPATIENT)
Dept: RADIOLOGY | Facility: HOSPITAL | Age: 64
Discharge: HOME | End: 2024-01-04
Attending: INTERNAL MEDICINE
Payer: COMMERCIAL

## 2024-01-04 DIAGNOSIS — Z12.31 ENCOUNTER FOR SCREENING MAMMOGRAM FOR BREAST CANCER: ICD-10-CM

## 2024-01-04 PROCEDURE — 77063 BREAST TOMOSYNTHESIS BI: CPT | Mod: 26,,, | Performed by: RADIOLOGY

## 2024-01-04 PROCEDURE — 77067 SCR MAMMO BI INCL CAD: CPT | Mod: 26,,, | Performed by: RADIOLOGY

## 2024-01-04 PROCEDURE — 77067 SCR MAMMO BI INCL CAD: CPT | Mod: TC

## 2024-02-05 ENCOUNTER — OFFICE VISIT (OUTPATIENT)
Dept: URGENT CARE | Facility: CLINIC | Age: 64
End: 2024-02-05
Payer: COMMERCIAL

## 2024-02-05 VITALS
WEIGHT: 256.19 LBS | OXYGEN SATURATION: 98 % | BODY MASS INDEX: 41.17 KG/M2 | HEART RATE: 90 BPM | DIASTOLIC BLOOD PRESSURE: 97 MMHG | HEIGHT: 66 IN | RESPIRATION RATE: 18 BRPM | SYSTOLIC BLOOD PRESSURE: 161 MMHG | TEMPERATURE: 98 F

## 2024-02-05 DIAGNOSIS — J01.90 ACUTE SINUSITIS, RECURRENCE NOT SPECIFIED, UNSPECIFIED LOCATION: Primary | ICD-10-CM

## 2024-02-05 LAB
CTP QC/QA: YES
CTP QC/QA: YES
POC MOLECULAR INFLUENZA A AGN: NEGATIVE
POC MOLECULAR INFLUENZA B AGN: NEGATIVE
SARS-COV-2 AG RESP QL IA.RAPID: NEGATIVE

## 2024-02-05 PROCEDURE — 87502 INFLUENZA DNA AMP PROBE: CPT | Mod: QW,S$GLB,,

## 2024-02-05 PROCEDURE — 87811 SARS-COV-2 COVID19 W/OPTIC: CPT | Mod: QW,S$GLB,,

## 2024-02-05 PROCEDURE — 99213 OFFICE O/P EST LOW 20 MIN: CPT | Mod: S$GLB,,,

## 2024-02-05 RX ORDER — FLUTICASONE PROPIONATE 50 MCG
1 SPRAY, SUSPENSION (ML) NASAL DAILY
Qty: 15.8 ML | Refills: 0 | Status: SHIPPED | OUTPATIENT
Start: 2024-02-05

## 2024-02-05 RX ORDER — DESLORATADINE 5 MG/1
5 TABLET ORAL DAILY
Qty: 30 TABLET | Refills: 0 | Status: SHIPPED | OUTPATIENT
Start: 2024-02-05 | End: 2024-03-06

## 2024-02-06 DIAGNOSIS — Z12.11 COLON CANCER SCREENING: ICD-10-CM

## 2024-02-06 NOTE — PATIENT INSTRUCTIONS
"                                                          Sinusitis   If your condition worsens or fails to improve we recommend that you receive another evaluation at the ER immediately or contact your PCP to discuss your concerns or return here. You must understand that you've received an urgent care treatment only and that you may be released before all your medical problems are known or treated. You the patient will arrange for followup care as instructed.   If we discussed that I think your illness is viral it will not respond to antibiotics and it will last 10-14 days.  Flonase (fluticasone) is a nasal spray which is available over the counter and may help with your symptoms   Zyrtec D, Claritin D or allegra D can also help with symptoms of congestion and drainage.   If you have hypertension avoid using the "D" which is the decongestant   If you just have drainage you can take plain zyrtec, claritin or allegra   If you just have a congested feeling you can take pseudoephedrine (unless you have high blood pressure) which you have to sign for behind the counter. Do not buy the phenylephrine which is on the shelf as it is not effective   Rest and fluids are also important.   Tylenol or ibuprofen can also be used as directed for pain unless you have an allergy to them or medical condition such as stomach ulcers, kidney or liver disease or blood thinners etc for which you should not be taking these type of medications.   If you are flying in the next few days Afrin nose drops for the airplane flight upon take off and landing may help. Other than at those times refrain from using afrin.   If you were prescribed a narcotic do not drive or operate heavy machinery while taking these medications.       "

## 2024-02-06 NOTE — PROGRESS NOTES
"Subjective:      Patient ID: Octavia Arrington is a 64 y.o. female.    Vitals:  height is 5' 6" (1.676 m) and weight is 116.2 kg (256 lb 2.8 oz). Her oral temperature is 97.8 °F (36.6 °C). Her blood pressure is 161/97 (abnormal) and her pulse is 90. Her respiration is 18 and oxygen saturation is 98%.     Chief Complaint: Headache    Patient is a 64-year-old female presenting with headache, congestion, chills that started yesterday.  Has been taking Viola-Lenox and vitamin-C.  Denies body aches, fever, nausea, vomiting, diarrhea, abdominal pain, ear pain, dizziness.    Headache   This is a new problem. The current episode started yesterday. The problem occurs constantly. The problem has been unchanged. The pain is located in the Temporal region. The pain does not radiate. The pain quality is not similar to prior headaches. The pain is at a severity of 7/10. The pain is moderate. Associated symptoms include sinus pressure. Pertinent negatives include no abdominal pain, coughing, dizziness, ear pain, fever, nausea, neck pain or vomiting. Nothing aggravates the symptoms. She has tried nothing for the symptoms. The treatment provided no relief.     Constitution: Positive for chills. Negative for fever.   HENT:  Positive for congestion and sinus pressure. Negative for ear pain.    Neck: Negative for neck pain.   Cardiovascular:  Negative for chest pain.   Respiratory:  Negative for cough.    Gastrointestinal:  Negative for abdominal pain, nausea, vomiting and diarrhea.   Genitourinary:  Negative for dysuria.   Musculoskeletal:  Negative for muscle ache.   Skin:  Negative for rash.   Allergic/Immunologic: Negative for sneezing.   Neurological:  Positive for headaches. Negative for dizziness.      Objective:     Physical Exam   Constitutional: She is oriented to person, place, and time. She appears well-developed.   HENT:   Head: Normocephalic and atraumatic.   Ears:   Right Ear: Tympanic membrane, external ear and ear canal " normal. impacted cerumen  Left Ear: Tympanic membrane, external ear and ear canal normal.   Nose: Congestion present.   Mouth/Throat: Oropharynx is clear and moist. Mucous membranes are moist. Oropharynx is clear.   Eyes: Conjunctivae, EOM and lids are normal. Pupils are equal, round, and reactive to light.   Neck: Trachea normal and phonation normal. Neck supple.   Cardiovascular: Normal rate, regular rhythm, normal heart sounds and normal pulses.   Pulmonary/Chest: Effort normal and breath sounds normal.   Musculoskeletal: Normal range of motion.         General: Normal range of motion.   Neurological: no focal deficit. She is alert and oriented to person, place, and time.   Skin: Skin is warm, dry and intact. Capillary refill takes less than 2 seconds.   Psychiatric: Her speech is normal and behavior is normal. Judgment and thought content normal.   Nursing note and vitals reviewed.    Results for orders placed or performed in visit on 02/05/24   SARS Coronavirus 2 Antigen, POCT Manual Read   Result Value Ref Range    SARS Coronavirus 2 Antigen Negative Negative     Acceptable Yes    POCT Influenza A/B MOLECULAR   Result Value Ref Range    POC Molecular Influenza A Ag Negative Negative, Not Reported    POC Molecular Influenza B Ag Negative Negative, Not Reported     Acceptable Yes        Assessment:     1. Acute sinusitis, recurrence not specified, unspecified location        Plan:       Acute sinusitis, recurrence not specified, unspecified location  -     SARS Coronavirus 2 Antigen, POCT Manual Read  -     POCT Influenza A/B MOLECULAR            There are no Patient Instructions on file for this visit.

## 2024-03-19 NOTE — PROGRESS NOTES
Subjective:       Patient ID: Octavia Arrington is a 64 y.o. female.    Chief Complaint: Glaucoma     HPI    DLS: 12/12/23 w/ Dr. Sloan    64 y.o. female presents for 3 mo IOP Check w/ [facility]. Patient denies changes   to [facility] but states vision is better some days than others. Has floaters OU,   stable per patient. Has occasional headaches/migraines. Denies eye pain   and flashes.    Gtts: none  Last edited by Lakeshia Segura on 3/20/2024  3:41 PM.              Assessment & Plan   Primary open angle glaucoma (POAG) of both eyes, mild stage  -     latanoprost 0.005 % ophthalmic solution; Place 1 drop into both eyes every evening.  Dispense: 7 mL; Refill: 3    Nuclear sclerosis of both eyes    Hypertensive retinopathy of both eyes, grade 1      POAG mild OU  -Fhx (-), Steroids (-),Trauma(+concussion)  -Drops: Latanoprost   -Drop intolerance/contraindication:   -Laser: -  -Surgeries: -  -CCT: 536 // 540  -Gonio: SS OU    1/22 HVF - Nonspecific non-contiguous defect inferiorly, may be early IAD OD // full OS  1/22 OCT-RNFL Low TI,  Borderline T OD // low TI,T borderline TS OS    12/23 RNFL B T/TI OD //  dec T/TI B TS OS --> stable almost 2 years.   FP 12/23  3/2024 HVF 24-2 Small inf defect VFI 99% OD // Small inf defect VFI 99% OS    Previously had  been on and off latanoprost and LTFU for 1.5 years.   HVF relatively stable  Discussed glaucoma - offered SLT --  mutually agree to restart latan    Nuclear sclerosis OU  Not visually significant. Monitor.    HTN Retinopathy OU  BP control with PCP      PLAN  Latan qHS OU    RTC 4 months IOP check, gonio Dr. Milady Sloan M.D., M.S.  Department of Ophthalmology   Division of Glaucoma Surgery  [facility]

## 2024-03-20 ENCOUNTER — OFFICE VISIT (OUTPATIENT)
Dept: OPHTHALMOLOGY | Facility: CLINIC | Age: 64
End: 2024-03-20
Payer: COMMERCIAL

## 2024-03-20 ENCOUNTER — CLINICAL SUPPORT (OUTPATIENT)
Dept: OPHTHALMOLOGY | Facility: CLINIC | Age: 64
End: 2024-03-20
Payer: COMMERCIAL

## 2024-03-20 DIAGNOSIS — H40.1131 PRIMARY OPEN ANGLE GLAUCOMA (POAG) OF BOTH EYES, MILD STAGE: Primary | ICD-10-CM

## 2024-03-20 DIAGNOSIS — H25.13 NUCLEAR SCLEROSIS OF BOTH EYES: ICD-10-CM

## 2024-03-20 DIAGNOSIS — H35.033 HYPERTENSIVE RETINOPATHY OF BOTH EYES, GRADE 1: ICD-10-CM

## 2024-03-20 PROCEDURE — 92083 EXTENDED VISUAL FIELD XM: CPT | Mod: S$GLB,,, | Performed by: STUDENT IN AN ORGANIZED HEALTH CARE EDUCATION/TRAINING PROGRAM

## 2024-03-20 PROCEDURE — 3288F FALL RISK ASSESSMENT DOCD: CPT | Mod: CPTII,S$GLB,, | Performed by: STUDENT IN AN ORGANIZED HEALTH CARE EDUCATION/TRAINING PROGRAM

## 2024-03-20 PROCEDURE — 99999 PR PBB SHADOW E&M-EST. PATIENT-LVL III: CPT | Mod: PBBFAC,,, | Performed by: STUDENT IN AN ORGANIZED HEALTH CARE EDUCATION/TRAINING PROGRAM

## 2024-03-20 PROCEDURE — 1100F PTFALLS ASSESS-DOCD GE2>/YR: CPT | Mod: CPTII,S$GLB,, | Performed by: STUDENT IN AN ORGANIZED HEALTH CARE EDUCATION/TRAINING PROGRAM

## 2024-03-20 PROCEDURE — 99214 OFFICE O/P EST MOD 30 MIN: CPT | Mod: S$GLB,,, | Performed by: STUDENT IN AN ORGANIZED HEALTH CARE EDUCATION/TRAINING PROGRAM

## 2024-03-20 PROCEDURE — 1160F RVW MEDS BY RX/DR IN RCRD: CPT | Mod: CPTII,S$GLB,, | Performed by: STUDENT IN AN ORGANIZED HEALTH CARE EDUCATION/TRAINING PROGRAM

## 2024-03-20 PROCEDURE — 1159F MED LIST DOCD IN RCRD: CPT | Mod: CPTII,S$GLB,, | Performed by: STUDENT IN AN ORGANIZED HEALTH CARE EDUCATION/TRAINING PROGRAM

## 2024-03-20 RX ORDER — LATANOPROST 50 UG/ML
1 SOLUTION/ DROPS OPHTHALMIC NIGHTLY
Qty: 7 ML | Refills: 3 | Status: SHIPPED | OUTPATIENT
Start: 2024-03-20 | End: 2024-04-19

## 2024-03-20 NOTE — PROGRESS NOTES
VISUAL FIELD TEST 24-2 SS-OU-DONE/AD  OU-REL-GOOD-FIX-POOR-COOP-GOOD/AD    PT HAS NO KNOWN ALLERGIES TO LATEX OR ADHESIVES./AD      MRX: OD +0.50            OS  -0.25

## 2024-03-22 PROBLEM — M35.00 SJOGREN'S SYNDROME, WITH UNSPECIFIED ORGAN INVOLVEMENT: Status: ACTIVE | Noted: 2024-03-22

## 2024-03-27 PROBLEM — I10 HYPERTENSION, ESSENTIAL, BENIGN: Status: ACTIVE | Noted: 2024-03-27

## 2024-04-01 ENCOUNTER — HOSPITAL ENCOUNTER (OUTPATIENT)
Dept: RADIOLOGY | Facility: HOSPITAL | Age: 64
Discharge: HOME | End: 2024-04-01
Attending: INTERNAL MEDICINE
Payer: COMMERCIAL

## 2024-04-01 DIAGNOSIS — R10.10 UPPER ABDOMINAL PAIN: ICD-10-CM

## 2024-04-01 PROCEDURE — 76700 US EXAM ABDOM COMPLETE: CPT | Mod: 26,,, | Performed by: RADIOLOGY

## 2024-04-01 PROCEDURE — 76700 US EXAM ABDOM COMPLETE: CPT | Mod: TC

## 2024-04-08 ENCOUNTER — LAB VISIT (OUTPATIENT)
Dept: LAB | Facility: HOSPITAL | Age: 64
End: 2024-04-08
Attending: INTERNAL MEDICINE
Payer: COMMERCIAL

## 2024-04-08 DIAGNOSIS — Z12.11 COLON CANCER SCREENING: ICD-10-CM

## 2024-04-08 PROCEDURE — 82274 ASSAY TEST FOR BLOOD FECAL: CPT | Performed by: INTERNAL MEDICINE

## 2024-04-09 LAB — HEMOCCULT STL QL IA: NEGATIVE

## 2024-07-09 ENCOUNTER — HOSPITAL ENCOUNTER (OUTPATIENT)
Dept: RADIOLOGY | Facility: HOSPITAL | Age: 64
Discharge: HOME | End: 2024-07-09
Attending: INTERNAL MEDICINE
Payer: COMMERCIAL

## 2024-07-09 ENCOUNTER — OFFICE VISIT (OUTPATIENT)
Dept: FAMILY MEDICINE | Facility: CLINIC | Age: 64
End: 2024-07-09
Payer: COMMERCIAL

## 2024-07-09 VITALS
OXYGEN SATURATION: 97 % | BODY MASS INDEX: 41.03 KG/M2 | HEART RATE: 85 BPM | DIASTOLIC BLOOD PRESSURE: 86 MMHG | WEIGHT: 255.31 LBS | HEIGHT: 66 IN | SYSTOLIC BLOOD PRESSURE: 136 MMHG | TEMPERATURE: 98 F

## 2024-07-09 DIAGNOSIS — D64.9 NORMOCYTIC ANEMIA: ICD-10-CM

## 2024-07-09 DIAGNOSIS — R07.9 CHEST PAIN, UNSPECIFIED TYPE: ICD-10-CM

## 2024-07-09 DIAGNOSIS — R49.0 HOARSENESS: ICD-10-CM

## 2024-07-09 DIAGNOSIS — R06.02 SOB (SHORTNESS OF BREATH): Primary | ICD-10-CM

## 2024-07-09 DIAGNOSIS — R06.02 SOB (SHORTNESS OF BREATH): ICD-10-CM

## 2024-07-09 DIAGNOSIS — Z78.0 ENCOUNTER FOR OSTEOPOROSIS SCREENING IN ASYMPTOMATIC POSTMENOPAUSAL PATIENT: ICD-10-CM

## 2024-07-09 DIAGNOSIS — Z13.820 ENCOUNTER FOR OSTEOPOROSIS SCREENING IN ASYMPTOMATIC POSTMENOPAUSAL PATIENT: ICD-10-CM

## 2024-07-09 PROBLEM — M35.00 SJOGREN'S SYNDROME, WITH UNSPECIFIED ORGAN INVOLVEMENT: Status: RESOLVED | Noted: 2024-03-22 | Resolved: 2024-07-09

## 2024-07-09 PROBLEM — I70.0 ATHEROSCLEROSIS OF AORTIC ARCH: Status: ACTIVE | Noted: 2024-07-09

## 2024-07-09 PROBLEM — I51.7 MILD CARDIOMEGALY: Status: ACTIVE | Noted: 2024-07-09

## 2024-07-09 LAB
OHS QRS DURATION: 88 MS
OHS QTC CALCULATION: 433 MS

## 2024-07-09 PROCEDURE — 71046 X-RAY EXAM CHEST 2 VIEWS: CPT | Mod: 26,,, | Performed by: RADIOLOGY

## 2024-07-09 PROCEDURE — 1159F MED LIST DOCD IN RCRD: CPT | Mod: CPTII,S$GLB,, | Performed by: INTERNAL MEDICINE

## 2024-07-09 PROCEDURE — 71046 X-RAY EXAM CHEST 2 VIEWS: CPT | Mod: TC,FY

## 2024-07-09 PROCEDURE — 1101F PT FALLS ASSESS-DOCD LE1/YR: CPT | Mod: CPTII,S$GLB,, | Performed by: INTERNAL MEDICINE

## 2024-07-09 PROCEDURE — 1160F RVW MEDS BY RX/DR IN RCRD: CPT | Mod: CPTII,S$GLB,, | Performed by: INTERNAL MEDICINE

## 2024-07-09 PROCEDURE — 93010 ELECTROCARDIOGRAM REPORT: CPT | Mod: S$GLB,,, | Performed by: INTERNAL MEDICINE

## 2024-07-09 PROCEDURE — G2211 COMPLEX E/M VISIT ADD ON: HCPCS | Mod: S$GLB,,, | Performed by: INTERNAL MEDICINE

## 2024-07-09 PROCEDURE — 3079F DIAST BP 80-89 MM HG: CPT | Mod: CPTII,S$GLB,, | Performed by: INTERNAL MEDICINE

## 2024-07-09 PROCEDURE — 99999 PR PBB SHADOW E&M-EST. PATIENT-LVL IV: CPT | Mod: PBBFAC,,, | Performed by: INTERNAL MEDICINE

## 2024-07-09 PROCEDURE — 99214 OFFICE O/P EST MOD 30 MIN: CPT | Mod: S$GLB,,, | Performed by: INTERNAL MEDICINE

## 2024-07-09 PROCEDURE — 3075F SYST BP GE 130 - 139MM HG: CPT | Mod: CPTII,S$GLB,, | Performed by: INTERNAL MEDICINE

## 2024-07-09 PROCEDURE — 3008F BODY MASS INDEX DOCD: CPT | Mod: CPTII,S$GLB,, | Performed by: INTERNAL MEDICINE

## 2024-07-09 PROCEDURE — 3288F FALL RISK ASSESSMENT DOCD: CPT | Mod: CPTII,S$GLB,, | Performed by: INTERNAL MEDICINE

## 2024-07-09 PROCEDURE — 93005 ELECTROCARDIOGRAM TRACING: CPT | Mod: S$GLB,,, | Performed by: INTERNAL MEDICINE

## 2024-07-09 NOTE — PROGRESS NOTES
SUBJECTIVE     Chief Complaint   Patient presents with    Shortness of Breath              HPI  Octavia Arrington is a 64 y.o. female with multiple medical diagnoses as listed in the medical history and problem list that presents for evaluation of SOB last week. Pt reports MONGE associated with some chest soreness. She seems to start breathing heavy and it can occur with rest and activity. Chest soreness is at a 4-5/10 and radiates into the back. Denies any N/V or diaphoresis. Pt has not been taking any meds for her symptoms. Of note, pt took an 8 hour train ride to [place] prior to the start of the SOB.    PAST MEDICAL HISTORY:  Past Medical History:   Diagnosis Date    Acromioclavicular joint arthritis 8/15/2023    Cataract     Glaucoma     Hypertension     Uveitis        PAST SURGICAL HISTORY:  Past Surgical History:   Procedure Laterality Date    BREAST BIOPSY Bilateral     ex bx/ age 16 and 22    BREAST BIOPSY Right 10/2021    core bx    BREAST SURGERY  Biopsy last 10/29/20    BUNIONECTOMY Right      SECTION      HERNIA REPAIR      At age 14    HYSTERECTOMY  2000    JOINT REPLACEMENT  R-. L-    Knee replacements    PARTIAL HYSTERECTOMY          REPAIR, HERNIA, INGUINAL Right         ROTATOR CUFF REPAIR      SMALL INTESTINE SURGERY      TOTAL REPLACEMENT OF BOTH KNEES USING COMPUTER-ASSISTED NAVIGATION      R in  and L in     TUBAL LIGATION  1991       SOCIAL HISTORY:  Social History     Socioeconomic History    Marital status:    Tobacco Use    Smoking status: Never     Passive exposure: Never    Smokeless tobacco: Never   Substance and Sexual Activity    Alcohol use: Never     Comment: occasionally    Drug use: Never    Sexual activity: Not Currently     Partners: Female     Comment: Not active     Social Determinants of Health     Financial Resource Strain: Patient Declined (3/14/2024)    Overall Financial Resource Strain (CARDIA)     Difficulty of Paying Living  Expenses: Patient declined   Food Insecurity: Patient Declined (3/14/2024)    Hunger Vital Sign     Worried About Running Out of Food in the Last Year: Patient declined     Ran Out of Food in the Last Year: Patient declined   Transportation Needs: No Transportation Needs (3/14/2024)    PRAPARE - Transportation     Lack of Transportation (Medical): No     Lack of Transportation (Non-Medical): No   Physical Activity: Insufficiently Active (3/14/2024)    Exercise Vital Sign     Days of Exercise per Week: 4 days     Minutes of Exercise per Session: 10 min   Stress: No Stress Concern Present (3/14/2024)    [employer] - Occupational Stress Questionnaire     Feeling of Stress : Not at all   Housing Stability: Unknown (3/14/2024)    Housing Stability Vital Sign     Unable to Pay for Housing in the Last Year: Patient declined     Number of Places Lived in the Last Year: 1     Unstable Housing in the Last Year: No       FAMILY HISTORY:  Family History   Problem Relation Name Age of Onset    Pacemaker/defibrilator Mother      Cataracts Father      Prostate cancer Father      Heart disease Father      Amblyopia Neg Hx      Blindness Neg Hx      Glaucoma Neg Hx      Macular degeneration Neg Hx      Retinal detachment Neg Hx      Strabismus Neg Hx      Diabetes Neg Hx         ALLERGIES AND MEDICATIONS: updated and reviewed.  Review of patient's allergies indicates:   Allergen Reactions    Bactrim [sulfamethoxazole-trimethoprim] Hives, Swelling and Rash    Sulfa (sulfonamide antibiotics) Itching    Shellfish containing products Other (See Comments)     Mild itching    Iodine      Causes burning sensation on skin    Losartan Rash and Blisters     Causes lips to blister with a rash     Current Outpatient Medications   Medication Sig Dispense Refill    amLODIPine (NORVASC) 10 MG tablet Take 1 tablet (10 mg total) by mouth once daily. 90 tablet 1    hydroCHLOROthiazide (HYDRODIURIL) 25 MG tablet Take 1  "tablet (25 mg total) by mouth once daily. 90 tablet 1    HYDROcodone-acetaminophen (NORCO) 5-325 mg per tablet Take 1 tablet by mouth every 6 (six) hours as needed for Pain. (Patient not taking: Reported on 12/12/2023) 6 tablet 0    latanoprost 0.005 % ophthalmic solution Place 1 drop into both eyes every evening. 7 mL 3     No current facility-administered medications for this visit.       ROS  Review of Systems   Constitutional:  Negative for chills and fever.   HENT:  Positive for voice change (hoarseness). Negative for hearing loss and sore throat.    Eyes:  Negative for visual disturbance.   Respiratory:  Positive for shortness of breath. Negative for cough.    Cardiovascular:  Positive for chest pain. Negative for palpitations and leg swelling.   Gastrointestinal:  Negative for abdominal pain, constipation, diarrhea, nausea and vomiting.   Genitourinary:  Negative for dysuria, frequency and urgency.   Musculoskeletal:  Negative for arthralgias, joint swelling and myalgias.   Skin:  Negative for rash and wound.   Neurological:  Negative for headaches.   Psychiatric/Behavioral:  Negative for agitation and confusion. The patient is not nervous/anxious.          OBJECTIVE     Physical Exam  Vitals:    07/09/24 1048   BP: 136/86   Pulse: 85   Temp: 97.6 °F (36.4 °C)    Body mass index is 41.21 kg/m².  Weight: 115.8 kg (255 lb 4.7 oz)   Height: 5' 6" (167.6 cm)     Physical Exam  Constitutional:       General: She is not in acute distress.     Appearance: She is well-developed.   HENT:      Head: Normocephalic and atraumatic.      Right Ear: Tympanic membrane, ear canal and external ear normal.      Left Ear: Tympanic membrane, ear canal and external ear normal.      Nose: Nose normal.   Eyes:      General: No scleral icterus.        Right eye: No discharge.         Left eye: No discharge.      Conjunctiva/sclera: Conjunctivae normal.   Neck:      Vascular: No JVD.      Trachea: No tracheal deviation. "   Cardiovascular:      Rate and Rhythm: Normal rate and regular rhythm.      Heart sounds: No murmur heard.     No friction rub. No gallop.   Pulmonary:      Effort: Pulmonary effort is normal. No respiratory distress.      Breath sounds: Normal breath sounds. No wheezing.   Chest:      Chest wall: No tenderness.   Abdominal:      General: Bowel sounds are normal. There is no distension.      Palpations: Abdomen is soft. There is no mass.      Tenderness: There is no abdominal tenderness. There is no guarding or rebound.   Musculoskeletal:         General: No tenderness or deformity. Normal range of motion.      Cervical back: Normal range of motion and neck supple.   Skin:     General: Skin is warm and dry.      Findings: No erythema or rash.   Neurological:      Mental Status: She is alert and oriented to person, place, and time.      Motor: No abnormal muscle tone.      Coordination: Coordination normal.   Psychiatric:         Behavior: Behavior normal.         Thought Content: Thought content normal.         Judgment: Judgment normal.           Health Maintenance         Date Due Completion Date    HIV Screening Never done ---    TETANUS VACCINE Never done ---    Hemoglobin A1c (Diabetic Prevention Screening) Never done ---    DEXA Scan Never done ---    Shingles Vaccine (1 of 2) Never done ---    RSV Vaccine (Age 60+ and Pregnant patients) (1 - 1-dose 60+ series) Never done ---    COVID-19 Vaccine (4 - 2023-24 season) 09/01/2023 11/8/2021    Pneumococcal Vaccines (Age 65+) (1 of 1 - PCV) Never done ---    Influenza Vaccine (1) 09/01/2024 9/18/2023    Mammogram 01/04/2025 1/4/2024    Colorectal Cancer Screening 04/08/2025 4/8/2024    Lipid Panel 11/20/2028 11/20/2023              ASSESSMENT     64 y.o. female with     1. SOB (shortness of breath)    2. Chest pain, unspecified type    3. Hoarseness    4. Normocytic anemia    5. Encounter for osteoporosis screening in asymptomatic postmenopausal patient         PLAN:     1. SOB (shortness of breath)  - Unclear etiology; pt with mild anemia, so will workup as below  - Also plan for pulm and cardiac pathology workup  - EKG 12-lead; Future; L atrial enlargement  - X-Ray Chest PA And Lateral; Future  - Echo; Future  - D-DIMER, QUANTITATIVE; Future  - B-TYPE NATRIURETIC PEPTIDE; Future    2. Chest pain, unspecified type  - As above with low threshold to have pt evaluated per Cardiology for stress test  - EKG 12-lead; Future  - X-Ray Chest PA And Lateral; Future  - Echo; Future  - D-DIMER, QUANTITATIVE; Future  - B-TYPE NATRIURETIC PEPTIDE; Future    3. Hoarseness  - Self-limited; recommend vocal rest    4. Normocytic anemia  - CBC Auto Differential; Future  - Iron and TIBC; Future  - Ferritin; Future  - Vitamin B12; Future  - Folate; Future    5. Encounter for osteoporosis screening in asymptomatic postmenopausal patient  - DXA Bone Density Axial Skeleton 1 or more sites; Future        RTC in 2 weeks for repeat assessment of current treatment plan       Do Nguyen MD  07/09/2024 10:59 AM        No follow-ups on file.

## 2024-07-10 ENCOUNTER — TELEPHONE (OUTPATIENT)
Dept: FAMILY MEDICINE | Facility: CLINIC | Age: 64
End: 2024-07-10
Payer: COMMERCIAL

## 2024-07-10 DIAGNOSIS — R07.1 CHEST PAIN ON BREATHING: Primary | ICD-10-CM

## 2024-07-10 DIAGNOSIS — Z88.8 ALLERGY TO IODINE: ICD-10-CM

## 2024-07-10 PROBLEM — D63.8 ANEMIA OF CHRONIC DISEASE: Status: ACTIVE | Noted: 2024-07-09

## 2024-07-10 RX ORDER — DIPHENHYDRAMINE HCL 25 MG
TABLET ORAL
Qty: 2 TABLET | Refills: 0 | Status: SHIPPED | OUTPATIENT
Start: 2024-07-10

## 2024-07-10 RX ORDER — PREDNISONE 50 MG/1
TABLET ORAL
Qty: 3 TABLET | Refills: 0 | Status: SHIPPED | OUTPATIENT
Start: 2024-07-10

## 2024-07-10 NOTE — TELEPHONE ENCOUNTER
Patient informed of results/recommendations. She verbalized understanding.   CT scheduled for 7/11/24 at 2:00 pm.

## 2024-07-10 NOTE — TELEPHONE ENCOUNTER
Please ensure patient gets MyChart message regarding need to have a stat CTA.  She should note that she will have to take meds prior to the CT scan given her history of the iodine allergy.  Those premedications have been sent to her pharmacy.

## 2024-07-11 ENCOUNTER — HOSPITAL ENCOUNTER (OUTPATIENT)
Dept: RADIOLOGY | Facility: HOSPITAL | Age: 64
Discharge: HOME | End: 2024-07-11
Attending: INTERNAL MEDICINE
Payer: COMMERCIAL

## 2024-07-11 DIAGNOSIS — R07.1 CHEST PAIN ON BREATHING: ICD-10-CM

## 2024-07-11 PROCEDURE — 71275 CT ANGIOGRAPHY CHEST: CPT | Mod: TC

## 2024-07-11 PROCEDURE — 71275 CT ANGIOGRAPHY CHEST: CPT | Mod: 26,,, | Performed by: RADIOLOGY

## 2024-07-11 PROCEDURE — 25500020 PHARM REV CODE 255: Performed by: INTERNAL MEDICINE

## 2024-07-11 RX ADMIN — IOHEXOL 75 ML: 350 INJECTION, SOLUTION INTRAVENOUS at 03:07

## 2024-07-23 ENCOUNTER — HOSPITAL ENCOUNTER (OUTPATIENT)
Dept: RADIOLOGY | Facility: CLINIC | Age: 64
Discharge: HOME | End: 2024-07-23
Attending: INTERNAL MEDICINE
Payer: COMMERCIAL

## 2024-07-23 DIAGNOSIS — Z13.820 ENCOUNTER FOR OSTEOPOROSIS SCREENING IN ASYMPTOMATIC POSTMENOPAUSAL PATIENT: ICD-10-CM

## 2024-07-23 DIAGNOSIS — Z78.0 ENCOUNTER FOR OSTEOPOROSIS SCREENING IN ASYMPTOMATIC POSTMENOPAUSAL PATIENT: ICD-10-CM

## 2024-07-23 PROCEDURE — 77080 DXA BONE DENSITY AXIAL: CPT | Mod: 26,,, | Performed by: INTERNAL MEDICINE

## 2024-07-23 PROCEDURE — 77080 DXA BONE DENSITY AXIAL: CPT | Mod: TC,PO

## 2024-09-16 ENCOUNTER — OFFICE VISIT (OUTPATIENT)
Dept: URGENT CARE | Facility: CLINIC | Age: 64
End: 2024-09-16
Payer: COMMERCIAL

## 2024-09-16 VITALS
SYSTOLIC BLOOD PRESSURE: 154 MMHG | BODY MASS INDEX: 40.98 KG/M2 | HEART RATE: 88 BPM | HEIGHT: 66 IN | TEMPERATURE: 98 F | WEIGHT: 255 LBS | DIASTOLIC BLOOD PRESSURE: 88 MMHG | RESPIRATION RATE: 18 BRPM | OXYGEN SATURATION: 96 %

## 2024-09-16 DIAGNOSIS — J06.9 VIRAL URI: ICD-10-CM

## 2024-09-16 DIAGNOSIS — R05.9 COUGH, UNSPECIFIED TYPE: Primary | ICD-10-CM

## 2024-09-16 LAB
CTP QC/QA: YES
SARS-COV-2 AG RESP QL IA.RAPID: NEGATIVE

## 2024-09-16 PROCEDURE — 99213 OFFICE O/P EST LOW 20 MIN: CPT | Mod: S$GLB,,, | Performed by: NURSE PRACTITIONER

## 2024-09-16 PROCEDURE — 87811 SARS-COV-2 COVID19 W/OPTIC: CPT | Mod: QW,S$GLB,, | Performed by: NURSE PRACTITIONER

## 2024-09-16 RX ORDER — FLUTICASONE PROPIONATE 50 MCG
1 SPRAY, SUSPENSION (ML) NASAL DAILY
Qty: 16 G | Refills: 0 | Status: SHIPPED | OUTPATIENT
Start: 2024-09-16

## 2024-09-16 RX ORDER — DESLORATADINE 5 MG/1
5 TABLET ORAL DAILY
Qty: 30 TABLET | Refills: 11 | Status: SHIPPED | OUTPATIENT
Start: 2024-09-16 | End: 2025-09-16

## 2024-09-16 NOTE — LETTER
September 16, 2024      [facility]  [address]  [place] [address]  Phone: [phone]  Fax: [phone]       Patient: Octavia Arrington   YOB: 1960  Date of Visit: 09/16/2024    To Whom It May Concern:    Kevin Arrington  was at [facility] on 09/16/2024. The patient may return to work/school on 9/18/24 with no restrictions. If you have any questions or concerns, or if I can be of further assistance, please do not hesitate to contact me.    Sincerely,    Yareli Boyle NP

## 2024-09-16 NOTE — PROGRESS NOTES
"Subjective:      Patient ID: Octavia Arrington is a 64 y.o. female.    Vitals:  height is 5' 6" (1.676 m) and weight is 115.7 kg (255 lb). Her oral temperature is 98.4 °F (36.9 °C). Her blood pressure is 154/88 (abnormal) and her pulse is 88. Her respiration is 18 and oxygen saturation is 96%.     Chief Complaint: Cough    Patient presents with nonproductive cough, nasal congestion, sore throat and headaches for the past week.  She reports taking NyQuil and other OTC cold medications with no relief from her symptoms.  Patient states she has experienced similar symptoms in the past that was relieved with Clarinex and Flonase.  She denies fever, chills, shortness of breath, nausea and vomiting    Cough  This is a new problem. The current episode started in the past 7 days. The problem has been unchanged. The problem occurs constantly. The cough is Productive of sputum. Associated symptoms include headaches and a sore throat. She has tried OTC cough suppressant for the symptoms. The treatment provided no relief.       HENT:  Positive for congestion and sore throat.    Respiratory:  Positive for cough.    Neurological:  Positive for headaches.      Objective:     Physical Exam   Constitutional: She is oriented to person, place, and time. She appears well-developed. She is cooperative.  Non-toxic appearance. She does not appear ill. No distress.   HENT:   Head: Normocephalic and atraumatic.   Ears:   Right Ear: Hearing and external ear normal.   Left Ear: Hearing and external ear normal.   Nose: Rhinorrhea present. No mucosal edema or nasal deformity. No epistaxis. Right sinus exhibits no maxillary sinus tenderness and no frontal sinus tenderness. Left sinus exhibits no maxillary sinus tenderness and no frontal sinus tenderness.   Mouth/Throat: Uvula is midline, oropharynx is clear and moist and mucous membranes are normal. No trismus in the jaw. Normal dentition. No uvula swelling. No oropharyngeal exudate or posterior " oropharyngeal edema.   Eyes: Conjunctivae and lids are normal. No scleral icterus.   Neck: Trachea normal and phonation normal. Neck supple. No edema present. No erythema present. No neck rigidity present.   Cardiovascular: Normal rate, normal heart sounds and normal pulses.   Pulmonary/Chest: Effort normal. No respiratory distress. She has no decreased breath sounds. She has no rhonchi.   Abdominal: Normal appearance.   Musculoskeletal: Normal range of motion.         General: No deformity. Normal range of motion.   Lymphadenopathy:     She has no cervical adenopathy.   Neurological: She is alert and oriented to person, place, and time. She exhibits normal muscle tone. Coordination normal.   Skin: Skin is warm, dry, intact, not diaphoretic and not pale.   Psychiatric: Her speech is normal and behavior is normal. Judgment and thought content normal.   Nursing note and vitals reviewed.      Assessment:     1. Cough, unspecified type    2. Viral URI        Plan:       Cough, unspecified type  -     SARS Coronavirus 2 Antigen, POCT Manual Read    Viral URI  -     fluticasone propionate (FLONASE) 50 mcg/actuation nasal spray; 1 spray (50 mcg total) by Each Nostril route once daily.  Dispense: 16 g; Refill: 0  -     desloratadine (CLARINEX) 5 mg tablet; Take 1 tablet (5 mg total) by mouth once daily.  Dispense: 30 tablet; Refill: 11      Results for orders placed or performed in visit on 09/16/24   SARS Coronavirus 2 Antigen, POCT Manual Read   Result Value Ref Range    SARS Coronavirus 2 Antigen Negative Negative     Acceptable Yes      - Rest.    - Drink plenty of fluids.  - Viral upper respiratory infections typically run their course in 10-14 days.      - You can take over-the-counter claritin, zyrtec, allegra, or xyzal as directed. These are antihistamines that can help with runny nose, nasal congestion, sneezing, and helps to dry up post-nasal drip, which usually causes sore throat and cough.    -  You can take plain Mucinex (guaifenesin) 1200 mg twice a day to help loosen mucous.     - If you do NOT have high blood pressure, you may use a decongestant form (D)  of this medication (ie. Claritin- D, zyrtec-D, allegra-D) or if you do not take the D form, you can take sudafed (pseudoephedrine) over the counter, which is a decongestant. Do NOT take two decongestant (D) medications at the same time (such as mucinex-D and claritin-D or plain sudafed and claritin D). Dextromethorphan (DM) is a cough suppressant over the counter (ie. mucinex DM, robitussin, delsym; dayquil/nyquil has DM as well.)     - You can use Flonase (fluticasone) nasal spray as directed for sinus congestion and postnasal drip. This is a steroid nasal spray that works locally over time to decrease the inflammation in your nose/sinuses and help with allergic symptoms. This is not an quick- relief spray like afrin, but it works well if used daily.  Discontinue if you develop nose bleed  - Use nasal saline prior to Flonase.  - Use Ocean Spray Nasal Saline 1-3 puffs each nostril every 2-3 hours then blow out onto tissue. This is to irrigate the nasal passage way to clear the sinus openings. Use until sinus problem resolved.    - A Neti Pot with sterile saline can help break up nasal congestion and give relief.      - Warm salt water gargles can help with sore throat     - Warm tea with honey can help with sore throat and cough. Honey is a natural cough suppressant.

## 2024-09-19 ENCOUNTER — TELEPHONE (OUTPATIENT)
Dept: URGENT CARE | Facility: CLINIC | Age: 64
End: 2024-09-19
Payer: COMMERCIAL

## 2024-09-19 ENCOUNTER — TELEPHONE (OUTPATIENT)
Dept: FAMILY MEDICINE | Facility: CLINIC | Age: 64
End: 2024-09-19
Payer: COMMERCIAL

## 2024-09-19 DIAGNOSIS — R05.1 ACUTE COUGH: Primary | ICD-10-CM

## 2024-09-19 DIAGNOSIS — M75.120 NONTRAUMATIC COMPLETE TEAR OF ROTATOR CUFF, UNSPECIFIED LATERALITY: Primary | ICD-10-CM

## 2024-09-19 RX ORDER — BENZONATATE 200 MG/1
200 CAPSULE ORAL 3 TIMES DAILY PRN
Qty: 30 CAPSULE | Refills: 0 | Status: SHIPPED | OUTPATIENT
Start: 2024-09-19 | End: 2024-09-29

## 2024-09-19 NOTE — TELEPHONE ENCOUNTER
Left shoulder pain started yesterday after lifting pans of food. She is requesting a referral for orthopedic . States   She was to have a rotator cuff repair in 2021? However, did PT and was able to manage the injury.   Offered an appointment in [facility] clinics and she refused. Patient agreed to an appointment with Dr. Nguyen only but if patient  could get an earlier appointment or referral to orthopedic.

## 2024-09-19 NOTE — TELEPHONE ENCOUNTER
----- Message from Estrellita Montero sent at 9/19/2024 10:14 AM CDT -----  Regarding: Referral Request  Type:  Patient Requesting Referral    Who Called: Self     Referral to What Specialty: orthopedics     Reason for Referral: L sided shoulder pain that travels down her arm    Does the patient want the referral with a specific physician?: no     [facility]     Would the patient rather a call back or a response via [facility]? Call     Best Call Back Number: .[phone]

## 2024-09-20 NOTE — TELEPHONE ENCOUNTER
Called patient notified that the referral is ordered and to schedule through the call center. She stated understanding.

## 2024-10-07 ENCOUNTER — OFFICE VISIT (OUTPATIENT)
Dept: FAMILY MEDICINE | Facility: CLINIC | Age: 64
End: 2024-10-07
Payer: COMMERCIAL

## 2024-10-07 VITALS
SYSTOLIC BLOOD PRESSURE: 122 MMHG | WEIGHT: 258.19 LBS | HEART RATE: 94 BPM | BODY MASS INDEX: 41.49 KG/M2 | TEMPERATURE: 98 F | DIASTOLIC BLOOD PRESSURE: 78 MMHG | HEIGHT: 66 IN | OXYGEN SATURATION: 99 %

## 2024-10-07 DIAGNOSIS — Z12.31 ENCOUNTER FOR SCREENING MAMMOGRAM FOR BREAST CANCER: ICD-10-CM

## 2024-10-07 DIAGNOSIS — B96.89 ACUTE BACTERIAL BRONCHITIS: Primary | ICD-10-CM

## 2024-10-07 DIAGNOSIS — Z23 NEEDS FLU SHOT: ICD-10-CM

## 2024-10-07 DIAGNOSIS — J20.8 ACUTE BACTERIAL BRONCHITIS: Primary | ICD-10-CM

## 2024-10-07 DIAGNOSIS — I70.0 ATHEROSCLEROSIS OF AORTIC ARCH: ICD-10-CM

## 2024-10-07 PROCEDURE — 3078F DIAST BP <80 MM HG: CPT | Mod: CPTII,S$GLB,, | Performed by: INTERNAL MEDICINE

## 2024-10-07 PROCEDURE — 3008F BODY MASS INDEX DOCD: CPT | Mod: CPTII,S$GLB,, | Performed by: INTERNAL MEDICINE

## 2024-10-07 PROCEDURE — 90471 IMMUNIZATION ADMIN: CPT | Mod: S$GLB,,, | Performed by: INTERNAL MEDICINE

## 2024-10-07 PROCEDURE — 3074F SYST BP LT 130 MM HG: CPT | Mod: CPTII,S$GLB,, | Performed by: INTERNAL MEDICINE

## 2024-10-07 PROCEDURE — 1101F PT FALLS ASSESS-DOCD LE1/YR: CPT | Mod: CPTII,S$GLB,, | Performed by: INTERNAL MEDICINE

## 2024-10-07 PROCEDURE — 1160F RVW MEDS BY RX/DR IN RCRD: CPT | Mod: CPTII,S$GLB,, | Performed by: INTERNAL MEDICINE

## 2024-10-07 PROCEDURE — 99214 OFFICE O/P EST MOD 30 MIN: CPT | Mod: 25,S$GLB,, | Performed by: INTERNAL MEDICINE

## 2024-10-07 PROCEDURE — 1159F MED LIST DOCD IN RCRD: CPT | Mod: CPTII,S$GLB,, | Performed by: INTERNAL MEDICINE

## 2024-10-07 PROCEDURE — 90653 IIV ADJUVANT VACCINE IM: CPT | Mod: S$GLB,,, | Performed by: INTERNAL MEDICINE

## 2024-10-07 PROCEDURE — 3288F FALL RISK ASSESSMENT DOCD: CPT | Mod: CPTII,S$GLB,, | Performed by: INTERNAL MEDICINE

## 2024-10-07 PROCEDURE — 99999 PR PBB SHADOW E&M-EST. PATIENT-LVL IV: CPT | Mod: PBBFAC,,, | Performed by: INTERNAL MEDICINE

## 2024-10-07 RX ORDER — DOXYCYCLINE HYCLATE 100 MG
100 TABLET ORAL 2 TIMES DAILY
Qty: 14 TABLET | Refills: 0 | Status: SHIPPED | OUTPATIENT
Start: 2024-10-07 | End: 2024-10-14

## 2024-10-07 NOTE — PROGRESS NOTES
SUBJECTIVE     Chief Complaint   Patient presents with    Chest Congestion       HPI  Octavia Arrington is a 64 y.o. female with multiple medical diagnoses as listed in the medical history and problem list that presents for evaluation of URI x 3 weeks. Pt reports a productive cough, chest congestion, fatigue, and headaches. Denies any fever, chills, or night sweats. Pt has been taking Flonase and Clarinex given by the urgent care without improvement of symptoms. Denies any sick contacts. +recent travel to [place] via plane.    PAST MEDICAL HISTORY:  Past Medical History:   Diagnosis Date    Acromioclavicular joint arthritis 8/15/2023    Cataract     Glaucoma     Hypertension     Uveitis        PAST SURGICAL HISTORY:  Past Surgical History:   Procedure Laterality Date    BREAST BIOPSY Bilateral     ex bx/ age 16 and 22    BREAST BIOPSY Right 10/2021    core bx    BREAST SURGERY  Biopsy last 10/29/20    BUNIONECTOMY Right      SECTION      HERNIA REPAIR      At age 14    HYSTERECTOMY  2000    JOINT REPLACEMENT  R-. L-    Knee replacements    PARTIAL HYSTERECTOMY          REPAIR, HERNIA, INGUINAL Right         ROTATOR CUFF REPAIR      SMALL INTESTINE SURGERY      TOTAL REPLACEMENT OF BOTH KNEES USING COMPUTER-ASSISTED NAVIGATION      R in  and L in     TUBAL LIGATION  1991       SOCIAL HISTORY:  Social History     Socioeconomic History    Marital status:    Tobacco Use    Smoking status: Never     Passive exposure: Never    Smokeless tobacco: Never   Substance and Sexual Activity    Alcohol use: Never     Comment: occasionally    Drug use: Never    Sexual activity: Not Currently     Partners: Female     Comment: Not active     Social Drivers of Health     Financial Resource Strain: Patient Declined (3/14/2024)    Overall Financial Resource Strain (CARDIA)     Difficulty of Paying Living Expenses: Patient declined   Food Insecurity: Patient Declined (3/14/2024)    Hunger  Vital Sign     Worried About Running Out of Food in the Last Year: Patient declined     Ran Out of Food in the Last Year: Patient declined   Transportation Needs: No Transportation Needs (3/14/2024)    PRAPARE - Transportation     Lack of Transportation (Medical): No     Lack of Transportation (Non-Medical): No   Physical Activity: Insufficiently Active (3/14/2024)    Exercise Vital Sign     Days of Exercise per Week: 4 days     Minutes of Exercise per Session: 10 min   Stress: No Stress Concern Present (3/14/2024)    [employer] - Occupational Stress Questionnaire     Feeling of Stress : Not at all   Housing Stability: Unknown (3/14/2024)    Housing Stability Vital Sign     Unable to Pay for Housing in the Last Year: Patient declined     Number of Places Lived in the Last Year: 1     Unstable Housing in the Last Year: No       FAMILY HISTORY:  Family History   Problem Relation Name Age of Onset    Pacemaker/defibrilator Mother      Cataracts Father      Prostate cancer Father      Heart disease Father      Amblyopia Neg Hx      Blindness Neg Hx      Glaucoma Neg Hx      Macular degeneration Neg Hx      Retinal detachment Neg Hx      Strabismus Neg Hx      Diabetes Neg Hx         ALLERGIES AND MEDICATIONS: updated and reviewed.  Review of patient's allergies indicates:   Allergen Reactions    Bactrim [sulfamethoxazole-trimethoprim] Hives, Swelling and Rash    Sulfa (sulfonamide antibiotics) Itching    Shellfish containing products Other (See Comments)     Mild itching    Iodine      Causes burning sensation on skin    Losartan Rash and Blisters     Causes lips to blister with a rash     Current Outpatient Medications   Medication Sig Dispense Refill    amLODIPine (NORVASC) 10 MG tablet Take 1 tablet (10 mg total) by mouth once daily. 90 tablet 1    desloratadine (CLARINEX) 5 mg tablet Take 1 tablet (5 mg total) by mouth once daily. 30 tablet 11    fluticasone propionate (FLONASE) 50  "mcg/actuation nasal spray 1 spray (50 mcg total) by Each Nostril route once daily. 16 g 0    hydroCHLOROthiazide (HYDRODIURIL) 25 MG tablet Take 1 tablet (25 mg total) by mouth once daily. 90 tablet 1    doxycycline (VIBRA-TABS) 100 MG tablet Take 1 tablet (100 mg total) by mouth 2 (two) times daily. for 7 days 14 tablet 0    HYDROcodone-acetaminophen (NORCO) 5-325 mg per tablet Take 1 tablet by mouth every 6 (six) hours as needed for Pain. 6 tablet 0    latanoprost 0.005 % ophthalmic solution Place 1 drop into both eyes every evening. 7 mL 3     No current facility-administered medications for this visit.       ROS  Review of Systems   Constitutional:  Positive for fatigue. Negative for chills and fever.   HENT:  Positive for congestion. Negative for hearing loss and sore throat.    Eyes:  Negative for visual disturbance.   Respiratory:  Positive for cough. Negative for shortness of breath.    Cardiovascular:  Negative for chest pain, palpitations and leg swelling.   Gastrointestinal:  Negative for abdominal pain, constipation, diarrhea, nausea and vomiting.   Genitourinary:  Negative for dysuria, frequency and urgency.   Musculoskeletal:  Negative for arthralgias, joint swelling and myalgias.   Skin:  Negative for rash and wound.   Neurological:  Positive for headaches.   Psychiatric/Behavioral:  Negative for agitation and confusion. The patient is not nervous/anxious.          OBJECTIVE     Physical Exam  Vitals:    10/07/24 1555   BP: 122/78   Pulse: 94   Temp: 97.7 °F (36.5 °C)    Body mass index is 41.67 kg/m².  Weight: 117.1 kg (258 lb 2.5 oz)   Height: 5' 6" (167.6 cm)     Physical Exam  Constitutional:       General: She is not in acute distress.     Appearance: She is well-developed.   HENT:      Head: Normocephalic and atraumatic.      Right Ear: External ear normal.      Left Ear: External ear normal.      Nose: Nose normal.   Eyes:      General: No scleral icterus.        Right eye: No discharge.        "  Left eye: No discharge.      Conjunctiva/sclera: Conjunctivae normal.   Neck:      Vascular: No JVD.      Trachea: No tracheal deviation.   Cardiovascular:      Rate and Rhythm: Normal rate and regular rhythm.      Heart sounds: No murmur heard.     No friction rub. No gallop.   Pulmonary:      Effort: Pulmonary effort is normal. No respiratory distress.      Breath sounds: Normal breath sounds. No wheezing.   Abdominal:      General: Bowel sounds are normal. There is no distension.      Palpations: Abdomen is soft. There is no mass.      Tenderness: There is no abdominal tenderness. There is no guarding or rebound.   Musculoskeletal:         General: No tenderness or deformity. Normal range of motion.      Cervical back: Normal range of motion and neck supple.   Skin:     General: Skin is warm and dry.      Findings: No erythema or rash.   Neurological:      Mental Status: She is alert and oriented to person, place, and time.      Motor: No abnormal muscle tone.      Coordination: Coordination normal.   Psychiatric:         Behavior: Behavior normal.         Thought Content: Thought content normal.         Judgment: Judgment normal.           Health Maintenance         Date Due Completion Date    HIV Screening Never done ---    TETANUS VACCINE Never done ---    Hemoglobin A1c (Diabetic Prevention Screening) Never done ---    Shingles Vaccine (1 of 2) Never done ---    RSV Vaccine (Age 60+ and Pregnant patients) (1 - Risk 60-74 years 1-dose series) Never done ---    Pneumococcal Vaccines (Age 65+) (1 of 1 - PCV) Never done ---    COVID-19 Vaccine (4 - 2024-25 season) 09/01/2024 11/8/2021    Mammogram 01/04/2025 1/4/2024    Colorectal Cancer Screening 04/08/2025 4/8/2024    DEXA Scan 07/23/2027 7/23/2024    Lipid Panel 11/20/2028 11/20/2023              ASSESSMENT     64 y.o. female with     1. Acute bacterial bronchitis    2. Atherosclerosis of aortic arch    3. Encounter for screening mammogram for breast cancer     4. Needs flu shot        PLAN:     1. Acute bacterial bronchitis  - Continue symptomatic treatment with rest, increase fluid intake, tylenol or ibuprofen PRN fever(temp >/= 100.4) or body aches. Okay to take OTC antihistamines, i.e. Bendaryl, Claritin, Allegra, etc. as needed.  - Okay to gargle with warm, salt water or use throat lozenges as needed  - Pt to take Abx to completion  - doxycycline (VIBRA-TABS) 100 MG tablet; Take 1 tablet (100 mg total) by mouth 2 (two) times daily. for 7 days  Dispense: 14 tablet; Refill: 0    2. Atherosclerosis of aortic arch  - Stable; no acute issues    3. Encounter for screening mammogram for breast cancer  - Mammo Digital Screening Bilat w/ [facility]; Future    4. Needs flu shot  - influenza (adjuvanted) (Fluad) 45 mcg/0.5 mL IM vaccine (> or = 64 yo) 0.5 mL        RTC in 1-2 weeks as needed for any acute worsening of current condition or failure to improve       Do Nguyen MD  10/07/2024 4:13 PM        No follow-ups on file.

## 2024-10-08 DIAGNOSIS — M25.512 LEFT SHOULDER PAIN, UNSPECIFIED CHRONICITY: Primary | ICD-10-CM

## 2024-10-08 NOTE — PROGRESS NOTES
Assessment: 64 y.o. female with left shoulder pain, suspect rotator cuff tear    I explained my diagnostic impression and the reasoning behind it in detail, using layman's terms.      Plan:   - MRI left shoulder - has failed treatment with physical therapy, oral medications, activity modifications.  She has weakness on exam concerning for rotator cuff tear.  She has had an MRI in the past however this is over 2 years old and needs to be repeated  - records request from [facility]  - Return to clinic after MRI complete    All questions were answered in detail. The patient is in full agreement with the treatment plan and will proceed accordingly.    Chief Complaint   Patient presents with    Left Shoulder - Pain     Initial visit (10/9/24): Octavia Arrington is a 64 y.o. female who presents today complaining of left shoulder pain  Duration of symptoms:  several years   Trauma or new activity: no  Pain is constant  Aggravating factors: lifting, reaching overhead, weather changes  Relieving factors: rest  Night pain is present and is disruptive to sleep when pain is severe   Radicular symptoms: no    Prior treatment:  Physical therapy without improvement - done at outside facility. Records not available to review  Tylenol without improvement  She cannot recall if she has justo treated with NSAIDs in the past    Pain does interfere with duties at work and activities of daily living .    This patient was seen in consultation at the request of Dr. Do Nguyen    This is the extent of the patient's complaints at this time.     Hand dominance: Right     Occupation:  - a lot of lifting     Review of patient's allergies indicates:   Allergen Reactions    Bactrim [sulfamethoxazole-trimethoprim] Hives, Swelling and Rash    Sulfa (sulfonamide antibiotics) Itching    Shellfish containing products Other (See Comments)     Mild itching    Iodine      Causes burning sensation on skin    Losartan Rash and  Blisters     Causes lips to blister with a rash         Physical Exam:   Vitals:    10/09/24 1532   PainSc: 0-No pain   PainLoc: Shoulder       General: Patient is alert, awake and oriented to time, place and person. Mood and affect are appropriate.  Patient does not appear to be in any distress, denies any constitutional symptoms and appears stated age.   HEENT: Pupils are equal and round, sclera are not injected. External examination of ears and nose reveals no abnormalities. Cranial nerves II-X are grossly intact  Skin: no rashes, abrasions or open wounds on the affected extremity   Resp: No respiratory distress or audible wheezing   CV: 2+  pulses, all extremities warm and well perfused   Left Shoulder    Shoulder Range of Motion    Right     Left   (Active/Passive)       Forward Elevation     165/170            165/170  External rotation (arm at side)  50/50             50/50   Internal rotation behind the back  L5             L5     Range of motion is painful     Acromioclavicular joint is not tender  Crossbody test: negative    Neer's positive  Hawkin's positive    Rah's positive  Drop arm negative  Belly press negative      Cuff Strength     Right     Left   Supraspinatus        5/5    4-/5  Infraspinatus     5/5    5/5  Subscapularis     5/5    5/5    Deltoid testing            5/5    5/5    Speeds positive  Yergasons positive  Tender to palpation over biceps    Elbow examination demonstrates no tenderness to palpation and has normal range of motion.     ltsi C5-T1  + epl, io, fds, fdp   2+ RP      Imaging:  3 views of the left shoulder:  positive for degenerative changes of the AC joint. The humeral head is well centered on the AP and axillary views.  There is sclerosis of the greater tuberosity. There is not significant degenerative change of the glenohumeral joint or posterior subluxation of the humeral head. No acute changes or fracture.      I personally reviewed and interpreted the patient's imaging  obtained today in clinic     A note notifying Dr. Do Nguyen of my findings was sent via the electronic medical record     This note was created by combination of typed  and M-Modal dictation. Transcription and phonetic errors may be present.  If there are any questions, please contact me.      Current Outpatient Medications:     amLODIPine (NORVASC) 10 MG tablet, Take 1 tablet (10 mg total) by mouth once daily., Disp: 90 tablet, Rfl: 1    desloratadine (CLARINEX) 5 mg tablet, Take 1 tablet (5 mg total) by mouth once daily., Disp: 30 tablet, Rfl: 11    doxycycline (VIBRA-TABS) 100 MG tablet, Take 1 tablet (100 mg total) by mouth 2 (two) times daily. for 7 days, Disp: 14 tablet, Rfl: 0    fluticasone propionate (FLONASE) 50 mcg/actuation nasal spray, 1 spray (50 mcg total) by Each Nostril route once daily., Disp: 16 g, Rfl: 0    hydroCHLOROthiazide (HYDRODIURIL) 25 MG tablet, Take 1 tablet (25 mg total) by mouth once daily., Disp: 90 tablet, Rfl: 1    HYDROcodone-acetaminophen (NORCO) 5-325 mg per tablet, Take 1 tablet by mouth every 6 (six) hours as needed for Pain., Disp: 6 tablet, Rfl: 0    latanoprost 0.005 % ophthalmic solution, Place 1 drop into both eyes every evening., Disp: 7 mL, Rfl: 3  No current facility-administered medications for this visit.    Past Medical History:   Diagnosis Date    Acromioclavicular joint arthritis 8/15/2023    Cataract     Glaucoma     Hypertension     Uveitis        Active Problem List with Overview Notes    Diagnosis Date Noted    Anemia of chronic disease 07/09/2024    Atherosclerosis of aortic arch 07/09/2024    Mild cardiomegaly 07/09/2024    Hypertension, essential, benign 03/27/2024    OAB (overactive bladder) 09/19/2023    BMI 40.0-44.9, adult 09/18/2023    Acromioclavicular joint arthritis 08/15/2023    Arthropathy of lumbar facet joint 08/15/2023    Biceps tendinitis 08/15/2023    Chondromalacia patellae 08/15/2023    Osteoarthritis of knee 08/15/2023     Degeneration of lumbar intervertebral disc 08/15/2023    Disorder of bursae of shoulder region 08/15/2023    Full thickness rotator cuff tear 08/15/2023    Greater trochanteric pain syndrome 08/15/2023    Localized, primary osteoarthritis 08/15/2023    Low back pain 08/15/2023    Subacromial bursitis 08/15/2023    Adjustment disorder with depressed mood 2022    Concussion with no loss of consciousness 2020    Fever, unspecified 2020    Headache 2020       Past Surgical History:   Procedure Laterality Date    BREAST BIOPSY Bilateral     ex bx/ age 16 and 22    BREAST BIOPSY Right 10/2021    core bx    BREAST SURGERY  Biopsy last 10/29/20    BUNIONECTOMY Right      SECTION      HERNIA REPAIR  1975    At age 14    HYSTERECTOMY  2000    JOINT REPLACEMENT  R-. L-    Knee replacements    PARTIAL HYSTERECTOMY      2000    REPAIR, HERNIA, INGUINAL Right     1975    ROTATOR CUFF REPAIR      SMALL INTESTINE SURGERY      TOTAL REPLACEMENT OF BOTH KNEES USING COMPUTER-ASSISTED NAVIGATION      R in  and L in     TUBAL LIGATION  1991       Social History     Socioeconomic History    Marital status:    Tobacco Use    Smoking status: Never     Passive exposure: Never    Smokeless tobacco: Never   Substance and Sexual Activity    Alcohol use: Never     Comment: occasionally    Drug use: Never    Sexual activity: Not Currently     Partners: Female     Comment: Not active     Social Drivers of Health     Financial Resource Strain: Patient Declined (3/14/2024)    Overall Financial Resource Strain (CARDIA)     Difficulty of Paying Living Expenses: Patient declined   Food Insecurity: Patient Declined (3/14/2024)    Hunger Vital Sign     Worried About Running Out of Food in the Last Year: Patient declined     Ran Out of Food in the Last Year: Patient declined   Transportation Needs: No Transportation Needs (3/14/2024)    PRAPARE - Transportation     Lack of Transportation  (Medical): No     Lack of Transportation (Non-Medical): No   Physical Activity: Insufficiently Active (3/14/2024)    Exercise Vital Sign     Days of Exercise per Week: 4 days     Minutes of Exercise per Session: 10 min   Stress: No Stress Concern Present (3/14/2024)    [employer] - Occupational Stress Questionnaire     Feeling of Stress : Not at all   Housing Stability: Unknown (3/14/2024)    Housing Stability Vital Sign     Unable to Pay for Housing in the Last Year: Patient declined     Number of Places Lived in the Last Year: 1     Unstable Housing in the Last Year: No

## 2024-10-09 ENCOUNTER — APPOINTMENT (OUTPATIENT)
Dept: RADIOLOGY | Facility: HOSPITAL | Age: 64
End: 2024-10-09
Attending: ORTHOPAEDIC SURGERY
Payer: COMMERCIAL

## 2024-10-09 ENCOUNTER — OFFICE VISIT (OUTPATIENT)
Dept: ORTHOPEDICS | Facility: CLINIC | Age: 64
End: 2024-10-09
Payer: COMMERCIAL

## 2024-10-09 DIAGNOSIS — M25.512 LEFT SHOULDER PAIN, UNSPECIFIED CHRONICITY: ICD-10-CM

## 2024-10-09 DIAGNOSIS — M25.512 CHRONIC LEFT SHOULDER PAIN: Primary | ICD-10-CM

## 2024-10-09 DIAGNOSIS — G89.29 CHRONIC LEFT SHOULDER PAIN: Primary | ICD-10-CM

## 2024-10-09 DIAGNOSIS — M75.120 NONTRAUMATIC COMPLETE TEAR OF ROTATOR CUFF, UNSPECIFIED LATERALITY: ICD-10-CM

## 2024-10-09 PROCEDURE — 1160F RVW MEDS BY RX/DR IN RCRD: CPT | Mod: CPTII,S$GLB,, | Performed by: ORTHOPAEDIC SURGERY

## 2024-10-09 PROCEDURE — 3288F FALL RISK ASSESSMENT DOCD: CPT | Mod: CPTII,S$GLB,, | Performed by: ORTHOPAEDIC SURGERY

## 2024-10-09 PROCEDURE — 73030 X-RAY EXAM OF SHOULDER: CPT | Mod: 26,LT,, | Performed by: RADIOLOGY

## 2024-10-09 PROCEDURE — 1159F MED LIST DOCD IN RCRD: CPT | Mod: CPTII,S$GLB,, | Performed by: ORTHOPAEDIC SURGERY

## 2024-10-09 PROCEDURE — 73030 X-RAY EXAM OF SHOULDER: CPT | Mod: TC,FY,PN,LT

## 2024-10-09 PROCEDURE — 99999 PR PBB SHADOW E&M-EST. PATIENT-LVL III: CPT | Mod: PBBFAC,,, | Performed by: ORTHOPAEDIC SURGERY

## 2024-10-09 PROCEDURE — 1101F PT FALLS ASSESS-DOCD LE1/YR: CPT | Mod: CPTII,S$GLB,, | Performed by: ORTHOPAEDIC SURGERY

## 2024-10-09 PROCEDURE — 99204 OFFICE O/P NEW MOD 45 MIN: CPT | Mod: S$GLB,,, | Performed by: ORTHOPAEDIC SURGERY

## 2024-10-09 RX ORDER — NAPROXEN 500 MG/1
500 TABLET ORAL 2 TIMES DAILY WITH MEALS
Qty: 60 TABLET | Refills: 0 | Status: SHIPPED | OUTPATIENT
Start: 2024-10-09

## 2024-10-30 ENCOUNTER — CLINICAL SUPPORT (OUTPATIENT)
Dept: OTHER | Facility: CLINIC | Age: 64
End: 2024-10-30
Payer: COMMERCIAL

## 2024-10-30 DIAGNOSIS — Z00.8 ENCOUNTER FOR OTHER GENERAL EXAMINATION: ICD-10-CM

## 2024-10-31 VITALS
WEIGHT: 264 LBS | BODY MASS INDEX: 42.43 KG/M2 | SYSTOLIC BLOOD PRESSURE: 160 MMHG | DIASTOLIC BLOOD PRESSURE: 96 MMHG | HEIGHT: 66 IN

## 2024-10-31 LAB
HDLC SERPL-MCNC: 73 MG/DL
POC CHOLESTEROL, LDL (DOCK): 42 MG/DL
POC CHOLESTEROL, TOTAL: 135 MG/DL
POC GLUCOSE, FASTING: 74 MG/DL (ref 60–110)
TRIGL SERPL-MCNC: 114 MG/DL

## 2024-11-22 ENCOUNTER — OFFICE VISIT (OUTPATIENT)
Dept: URGENT CARE | Facility: CLINIC | Age: 64
End: 2024-11-22
Payer: COMMERCIAL

## 2024-11-22 VITALS
WEIGHT: 264 LBS | DIASTOLIC BLOOD PRESSURE: 83 MMHG | OXYGEN SATURATION: 97 % | HEIGHT: 66 IN | RESPIRATION RATE: 18 BRPM | BODY MASS INDEX: 42.43 KG/M2 | SYSTOLIC BLOOD PRESSURE: 129 MMHG | HEART RATE: 92 BPM | TEMPERATURE: 98 F

## 2024-11-22 DIAGNOSIS — Z02.6 ENCOUNTER RELATED TO WORKER'S COMPENSATION CLAIM: ICD-10-CM

## 2024-11-22 DIAGNOSIS — M25.511 ACUTE PAIN OF RIGHT SHOULDER: ICD-10-CM

## 2024-11-22 DIAGNOSIS — M25.551 RIGHT HIP PAIN: ICD-10-CM
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
## 2024-11-22 NOTE — LETTER
[facility] [place] [id number]  [place] [address]  Phone: [phone]  Fax: [phone]  [facility]: [phone]    Pt Name: Octavia Arrington  Injury Date: 11/21/2024   Employee ID:  Date of First Treatment: 11/22/2024   Company: [employer]ed reference to record [id number]      Appointment Time: 05:45 PM Arrived: 18:30 pm   Provider: Michelle Lovelace DNP Time Out:19:00 pm     Office Treatment:   1. Fall, initial encounter    2. Acute pain of right shoulder    3. Right hip pain                     Return Appointment: Visit date  at 11/25/2024 09:00 am

## 2024-11-23 NOTE — PATIENT INSTRUCTIONS
Discharge instructions for Initial Encounter of Fall, with Pain to Right shoulder and Right hip  Referral  Occupational Health 11/25/2024 09:AM   RICE - Rest, ice, compression and elevation to the affected joint or limb as needed.    Rest. Allow your injury to heal before you do slow movements.  Place an ice pack or a bag of frozen peas wrapped in a towel over the painful part. Never put ice right on the skin. Do not leave the ice on more than 10 to 15 minutes at a time. Ice after activity may help decrease pain and swelling. Never ice before stretching.  Prop your wrist/arm/knee/pain on pillows to help with swelling.  Use a splint/ brace if the doctor tells you to do this.  Please drink plenty of fluids.  Please get plenty of rest.      Pain Control  If not allergic, please take over the counter Tylenol (Acetaminophen) 500mg (take 2 by mouth) every 6 hours and/or Motrin (Ibuprofen) 800 mg every 4-6 hours as directed for control of pain and/or fever.    If you were not prescribed an anti-inflammatory medication, and if you do not have any history of stomach/intestinal ulcers, or kidney disease, or are not taking a blood thinner such as Coumadin, Plavix, Pradaxa, Eloquis, or Xaralta for example, it is OK to take over the counter Ibuprofen or Advil or Motrin or Aleve as directed.  Do not take these medications on an empty stomach.    Please remember that you have received care at an urgent care today. Urgent cares are not emergency rooms and are not equipped to handle life threatening emergencies and cannot rule in or out certain medical conditions and you may be released before all of your medical problems are known or treated. Please arrange follow up with your primary care physician or speciality clinic   (orthopedics) within 2-5 days if your signs and symptoms have not resolved or worsen. Patient can call our Referral Hotline at [phone] to make an appointment.    Please return here or go to the Emergency  Department for any concerns or worsening of condition.Patient was educated on signs/symptoms that would warrant emergent medical attention. Patient verbalized understanding.  More trouble getting up from a chair, going up and down stairs, or walking  Pain, swelling, warmth, numbness, tingling, or discoloration in the calf below the injured or sore knee  You are not feeling better in 2 or 3 days or you are feeling worse

## 2024-11-23 NOTE — PROGRESS NOTES
"Subjective:      Patient ID: Octavia Arrington is a 64 y.o. female.    Vitals:  height is 5' 6" (1.676 m) and weight is 119.7 kg (264 lb). Her oral temperature is 97.9 °F (36.6 °C). Her blood pressure is 129/83 and her pulse is 92. Her respiration is 18 and oxygen saturation is 97%.     Chief Complaint: Work Related Injury and Back Pain    Pt states that she fell at work on 11/21 and injured her right lower back and shoulder . Pt states that she taking naproxen and motrin . Pain level is 7.     Back Pain  This is a new problem. The current episode started yesterday. The problem occurs constantly. The problem is unchanged. The pain is present in the lumbar spine. The quality of the pain is described as aching. The pain does not radiate. The pain is at a severity of 7/10. The pain is moderate. The symptoms are aggravated by bending and position. Pertinent negatives include no chest pain or fever. She has tried NSAIDs for the symptoms. The treatment provided mild relief.       Constitution: Negative for chills and fever.   Cardiovascular:  Negative for chest trauma, chest pain, leg swelling, palpitations and sob on exertion.   Musculoskeletal:  Positive for pain and back pain.   Skin:  Negative for color change, rash and bruising.   Neurological:         Pt reports no loss of consciousness and hitting head with fall.      Objective:     Physical Exam   Constitutional: She is oriented to person, place, and time. She appears well-developed. She is cooperative.  Non-toxic appearance. She does not appear ill. No distress. awake  HENT:   Head: Normocephalic and atraumatic.   Ears:   Right Ear: Hearing and external ear normal.   Left Ear: Hearing and external ear normal.   Nose: Nose normal. No mucosal edema, rhinorrhea, nasal deformity or congestion. No epistaxis. Right sinus exhibits no maxillary sinus tenderness and no frontal sinus tenderness. Left sinus exhibits no maxillary sinus tenderness and no frontal sinus tenderness. "   Mouth/Throat: Uvula is midline, oropharynx is clear and moist and mucous membranes are normal. Mucous membranes are moist. No trismus in the jaw. Normal dentition. No uvula swelling. No oropharyngeal exudate, posterior oropharyngeal edema or posterior oropharyngeal erythema. Oropharynx is clear.   Eyes: Conjunctivae and lids are normal. Pupils are equal, round, and reactive to light. No scleral icterus. Extraocular movement intact   Neck: Trachea normal and phonation normal. Neck supple. No thyromegaly present. No edema present. No erythema present. No neck rigidity present. No decreased range of motion present.   Cardiovascular: Normal rate, regular rhythm, S1 normal, S2 normal, normal heart sounds and normal pulses.   Pulmonary/Chest: Effort normal and breath sounds normal. No respiratory distress. She has no decreased breath sounds. She has no wheezes. She has no rhonchi. She has no rales.   Abdominal: Normal appearance and bowel sounds are normal. Soft. protuberant   Musculoskeletal: Normal range of motion.         General: No deformity. Normal range of motion.      Right shoulder: She exhibits tenderness. She exhibits normal range of motion, no swelling, no laceration, normal pulse and normal strength.      Left shoulder: Normal.      Right hip: She exhibits tenderness. She exhibits normal range of motion and normal strength.      Left hip: Normal.      Right lower leg: No edema.      Left lower leg: No edema.      Comments: Pt ambulates with out use of walker and cane, full range of motion, with stiffness. Equal bilateral hand strength with grasp 5/5. Right hip without soft tissue swelling or bruising, pt weight bears equally with stiffness to right hip. Pt can touch knees and toes well.    Lymphadenopathy:     She has no cervical adenopathy.   Neurological: no focal deficit. She is alert and oriented to person, place, and time. She exhibits normal muscle tone. Coordination normal.   Skin: Skin is warm, dry,  intact, not diaphoretic and not pale. Capillary refill takes less than 2 seconds.   Psychiatric: Her speech is normal and behavior is normal. Mood, judgment and thought content normal.   Nursing note and vitals reviewed.      Assessment:     1. Fall, initial encounter    2. Acute pain of right shoulder    3. Right hip pain        Plan:       Fall, initial encounter    Acute pain of right shoulder    Right hip pain      Patient Instructions   Discharge instructions for Initial Encounter of Fall, with Pain to Right shoulder and Right hip  Referral  Occupational Health 11/25/2024 09:AM   RICE - Rest, ice, compression and elevation to the affected joint or limb as needed.    Rest. Allow your injury to heal before you do slow movements.  Place an ice pack or a bag of frozen peas wrapped in a towel over the painful part. Never put ice right on the skin. Do not leave the ice on more than 10 to 15 minutes at a time. Ice after activity may help decrease pain and swelling. Never ice before stretching.  Prop your wrist/arm/knee/pain on pillows to help with swelling.  Use a splint/ brace if the doctor tells you to do this.  Please drink plenty of fluids.  Please get plenty of rest.      Pain Control  If not allergic, please take over the counter Tylenol (Acetaminophen) 500mg (take 2 by mouth) every 6 hours and/or Motrin (Ibuprofen) 800 mg every 4-6 hours as directed for control of pain and/or fever.    If you were not prescribed an anti-inflammatory medication, and if you do not have any history of stomach/intestinal ulcers, or kidney disease, or are not taking a blood thinner such as Coumadin, Plavix, Pradaxa, Eloquis, or Xaralta for example, it is OK to take over the counter Ibuprofen or Advil or Motrin or Aleve as directed.  Do not take these medications on an empty stomach.    Please remember that you have received care at an urgent care today. Urgent cares are not emergency rooms and are not equipped to handle life  threatening emergencies and cannot rule in or out certain medical conditions and you may be released before all of your medical problems are known or treated. Please arrange follow up with your primary care physician or speciality clinic   (orthopedics) within 2-5 days if your signs and symptoms have not resolved or worsen. Patient can call our Referral Hotline at [phone] to make an appointment.    Please return here or go to the Emergency Department for any concerns or worsening of condition.Patient was educated on signs/symptoms that would warrant emergent medical attention. Patient verbalized understanding.  More trouble getting up from a chair, going up and down stairs, or walking  Pain, swelling, warmth, numbness, tingling, or discoloration in the calf below the injured or sore knee  You are not feeling better in 2 or 3 days or you are feeling worse

## 2024-11-25 ENCOUNTER — OFFICE VISIT (OUTPATIENT)
Dept: URGENT CARE | Facility: CLINIC | Age: 64
End: 2024-11-25
Payer: COMMERCIAL

## 2024-11-25 VITALS
OXYGEN SATURATION: 98 % | DIASTOLIC BLOOD PRESSURE: 83 MMHG | HEART RATE: 64 BPM | WEIGHT: 264 LBS | RESPIRATION RATE: 17 BRPM | HEIGHT: 66 IN | BODY MASS INDEX: 42.43 KG/M2 | TEMPERATURE: 98 F | SYSTOLIC BLOOD PRESSURE: 128 MMHG

## 2024-11-25 DIAGNOSIS — M54.9 DORSALGIA, UNSPECIFIED: ICD-10-CM
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
## 2024-11-25 DIAGNOSIS — S20.221A CONTUSION, BACK, RIGHT, INITIAL ENCOUNTER: Primary | ICD-10-CM

## 2024-11-25 DIAGNOSIS — Z02.6 ENCOUNTER RELATED TO WORKER'S COMPENSATION CLAIM: ICD-10-CM

## 2024-11-25 DIAGNOSIS — S49.91XA INJURY OF RIGHT SHOULDER, INITIAL ENCOUNTER: ICD-10-CM

## 2024-11-25 DIAGNOSIS — S46.911A STRAIN OF RIGHT SHOULDER, INITIAL ENCOUNTER: ICD-10-CM

## 2024-11-25 PROCEDURE — 72100 X-RAY EXAM L-S SPINE 2/3 VWS: CPT | Mod: S$GLB,,, | Performed by: RADIOLOGY

## 2024-11-25 PROCEDURE — 73030 X-RAY EXAM OF SHOULDER: CPT | Mod: RT,S$GLB,, | Performed by: RADIOLOGY

## 2024-11-25 PROCEDURE — 99203 OFFICE O/P NEW LOW 30 MIN: CPT | Mod: S$GLB,,, | Performed by: PHYSICIAN ASSISTANT

## 2024-11-25 NOTE — LETTER
[facility] [place] [address] [facility], [address]  [place] [address]  Phone: [phone]  Fax: [phone]  [facility]: [id number]    Pt Name: Octavia Arrington  Injury Date: 11/21/2024   Employee ID: [id number] Date of first Treatment: 11/25/2024   Company: [employer] to [employer] [place]      Appointment Time: 09:10 AM Arrived: 9:20 am   Provider: Bruno Lin PA-C Time Out:11:05 am     Office Treatment:   1. Contusion, back, right, initial encounter    2. Encounter related to worker's compensation claim    3. Injury of right shoulder, initial encounter    4. Dorsalgia, unspecified    5. Fall from ground level    6. Strain of right shoulder, initial encounter    7. Work related injury          Patient Instructions: Attention not to aggravate affected area, Daily home exercises/warm soaks    Restrictions: No lifting/pushing/pulling more than 25 lbs     Return Appointment: 12/2/2024 at 9:30 am  SW

## 2024-11-25 NOTE — PROGRESS NOTES
Subjective:      Patient ID: Octavia Arrington is a 64 y.o. female.    Chief Complaint: Fall (DOI 11/21/2024 FALL, RT shoulder and lower back/QA)    Patient's place of employment - [employer]  Patient's job title - assistant   Date of injury - 11/21/2024  Body part injured including left or right - RT Shoulder, lower back and RT hip   Injury Mechanism - Fall  What they were doing when they got hurt - pt states she was walking from the restroom when she slipped and fell due to water being on there floor.   What they did immediately after - reported it and continued to work.    Pain scale right now - 6/10 Rt shoulder, lower back and right hip.  QA    Provider note:  63 y/o RHD AAF presents with right shoulder and back pain after a slip and fall from ground level while at work 4 days ago. She states she was at work when she slipped on a wet floor landing on the right side of her back. She reports pain in the right upper and low back and right shoulder. She denies hitting her head. She has been taking Naproxen and applying heat. Pt was seen at urgent care the day after her fall. MEB    Fall  The fall occurred while walking. Pertinent negatives include no abdominal pain or numbness.     Constitution: Positive for activity change.   HENT:  Negative for facial trauma.    Neck: Negative for neck pain.   Cardiovascular:  Negative for chest pain.   Respiratory:  Negative for shortness of breath.    Gastrointestinal:  Negative for abdominal pain.   Musculoskeletal:  Positive for trauma, joint pain and back pain.   Skin:  Negative for wound and bruising.   Neurological:  Negative for numbness and tingling.     Objective:     Physical Exam  Vitals and nursing note reviewed.   Constitutional:       General: She is not in acute distress.     Appearance: Normal appearance. She is well-developed.   HENT:      Head: Normocephalic and atraumatic.      Right Ear: Hearing and external ear normal.      Left Ear: Hearing  and external ear normal.      Nose: Nose normal. No nasal deformity.   Eyes:      General: Lids are normal.      Conjunctiva/sclera: Conjunctivae normal.      Right eye: Right conjunctiva is not injected.      Left eye: Left conjunctiva is not injected.   Neck:      Trachea: Trachea normal.   Cardiovascular:      Pulses: Normal pulses.           Dorsalis pedis pulses are 2+ on the right side and 2+ on the left side.        Posterior tibial pulses are 2+ on the right side and 2+ on the left side.   Pulmonary:      Effort: Pulmonary effort is normal. No respiratory distress.      Breath sounds: No stridor.   Musculoskeletal:      Right shoulder: Tenderness present. No swelling or deformity. Normal range of motion. Normal strength. Normal pulse.        Arms:       Cervical back: Normal and normal range of motion. No spinous process tenderness or muscular tenderness.      Thoracic back: Tenderness present. Normal range of motion.      Lumbar back: Tenderness present. No deformity. Decreased range of motion. Negative right straight leg raise test and negative left straight leg raise test.        Back:    Skin:     General: Skin is warm and dry.      Findings: No abrasion or bruising.   Neurological:      General: No focal deficit present.      Mental Status: She is alert.      GCS: GCS eye subscore is 4. GCS verbal subscore is 5. GCS motor subscore is 6.      Sensory: Sensation is intact. No sensory deficit.      Motor: Motor function is intact. No weakness (BLE strength 5/5).      Deep Tendon Reflexes: Reflexes are normal and symmetric.      Reflex Scores:       Patellar reflexes are 2+ on the right side and 2+ on the left side.       Achilles reflexes are 2+ on the right side and 2+ on the left side.  Psychiatric:         Attention and Perception: She is attentive.         Speech: Speech normal.         Behavior: Behavior normal.         Thought Content: Thought content normal.          X-ray Shoulder 2 or More Views  Right    Result Date: 11/25/2024  EXAMINATION: SEE ABOVE XR SHOULDER COMPLETE 2 OR MORE VIEWS RIGHT CLINICAL HISTORY: Unspecified injury of right shoulder and upper arm, initial encounter TECHNIQUE: Two or three views of the right shoulder were performed. COMPARISON: None FINDINGS: Glenohumeral joint space is intact.  The degenerative changes seen at the acromioclavicular joint space.  There is a little sclerosis in the proximal right humerus. Electronically signed by: Bruno Wilkinson MD Date:    11/25/2024 Time:    11:08    X-Ray Lumbar Spine 2 Or 3 Views    Result Date: 11/25/2024  EXAMINATION: XR LUMBAR SPINE 2 OR 3 VIEWS CLINICAL HISTORY: Low back pain, no red flags, no prior management;WORKERS COMP;  Dorsalgia, unspecified TECHNIQUE: Lumbar spine 2 or three views COMPARISON: None FINDINGS: Vertebral bodies are intact. There is narrowing of the L3-L4 and L4-L5 disc spaces. Bony spurring is seen. No collapse or destruction is noted.     See above Electronically signed by: Bruno Wilkinson MD Date:    11/25/2024 Time:    11:07     Assessment:      1. Contusion, back, right, initial encounter    2. Encounter related to worker's compensation claim    3. Injury of right shoulder, initial encounter    4. Dorsalgia, unspecified    5. Fall from ground level    6. Strain of right shoulder, initial encounter    7. Work related injury      Plan:          Patient Instructions: Attention not to aggravate affected area, Daily home exercises/warm soaks   Restrictions: No lifting/pushing/pulling more than 25 lbs  Follow up in about 1 week (around 12/2/2024) for Reassessment.

## 2024-12-03 ENCOUNTER — OFFICE VISIT (OUTPATIENT)
Dept: URGENT CARE | Facility: CLINIC | Age: 64
End: 2024-12-03
Payer: COMMERCIAL

## 2024-12-03 DIAGNOSIS — Z02.6 ENCOUNTER RELATED TO WORKER'S COMPENSATION CLAIM: Primary | ICD-10-CM
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
## 2024-12-03 DIAGNOSIS — S49.91XD INJURY OF RIGHT SHOULDER, SUBSEQUENT ENCOUNTER: ICD-10-CM

## 2024-12-03 DIAGNOSIS — S20.221D: ICD-10-CM

## 2024-12-03 DIAGNOSIS — S46.911D STRAIN OF RIGHT SHOULDER, SUBSEQUENT ENCOUNTER: ICD-10-CM

## 2024-12-03 PROCEDURE — 99214 OFFICE O/P EST MOD 30 MIN: CPT | Mod: S$GLB,,, | Performed by: EMERGENCY MEDICINE

## 2024-12-03 RX ORDER — TIZANIDINE 4 MG/1
4 TABLET ORAL EVERY 8 HOURS
Qty: 21 TABLET | Refills: 0 | Status: SHIPPED | OUTPATIENT
Start: 2024-12-03 | End: 2024-12-10

## 2024-12-03 NOTE — PROGRESS NOTES
Subjective:      Patient ID: Octavia Arrington is a 64 y.o. female.    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  She had a slip and fall and reported right shoulder pain as well as right low back pain which is significantly improved however not resolved.  She has been taking naproxen, Tylenol.  Will add tizanidine as muscle relaxant for the shoulder and back.  She can work regular duty taking care not to aggravate the affected area and return to clinic in 1 week to assure continued improvement and resolution of symptoms before being discharged.  I have reviewed the x-rays and x-rays are negative for acute bony injury or fracture.    Back Pain  Pertinent negatives include no abdominal pain, chest pain or numbness.     ros  Constitution: Positive for activity change.   HENT:  Negative for facial trauma.    Neck: Negative for neck pain.   Cardiovascular:  Negative for chest pain.   Respiratory:  Negative for shortness of breath.    Gastrointestinal:  Negative for abdominal pain.   Musculoskeletal:  Positive for trauma, joint pain and back pain.   Skin:  Negative for wound and bruising.   Neurological:  Negative for numbness and tingling.     Objective:     Physical Exam  Vitals and nursing note reviewed.   Constitutional:       General: She is not in acute distress.     Appearance: Normal appearance. She is well-developed.   HENT:      Head: Normocephalic and atraumatic.      Right Ear: Hearing and external ear normal.      Left Ear: Hearing and external ear normal.      Nose: Nose normal. No nasal deformity.   Eyes:       General: Lids are normal.      Conjunctiva/sclera: Conjunctivae normal.      Right eye: Right conjunctiva is not injected.      Left eye: Left conjunctiva is not injected.   Neck:      Trachea: Trachea normal.   Cardiovascular:      Pulses: Normal pulses.           Dorsalis pedis pulses are 2+ on the right side and 2+ on the left side.        Posterior tibial pulses are 2+ on the right side and 2+ on the left side.   Pulmonary:      Effort: Pulmonary effort is normal. No respiratory distress.      Breath sounds: No stridor.   Musculoskeletal:      Right shoulder: Tenderness present. No swelling or deformity. Normal range of motion. Normal strength. Normal pulse.        Arms:       Cervical back: Normal and normal range of motion. No spinous process tenderness or muscular tenderness.      Thoracic back: Tenderness present. Normal range of motion.      Lumbar back: Tenderness present. No deformity. Decreased range of motion. Negative right straight leg raise test and negative left straight leg raise test.        Back:       Comments: Same areas however subjectively and objectively significantly improved.   Skin:     General: Skin is warm and dry.      Findings: No abrasion or bruising.   Neurological:      General: No focal deficit present.      Mental Status: She is alert.      GCS: GCS eye subscore is 4. GCS verbal subscore is 5. GCS motor subscore is 6.      Sensory: Sensation is intact. No sensory deficit.      Motor: Motor function is intact. No weakness (BLE strength 5/5).      Deep Tendon Reflexes: Reflexes are normal and symmetric.      Reflex Scores:       Patellar reflexes are 2+ on the right side and 2+ on the left side.       Achilles reflexes are 2+ on the right side and 2+ on the left side.  Psychiatric:         Attention and Perception: She is attentive.         Speech: Speech normal.         Behavior: Behavior normal.         Thought Content: Thought content normal.        X-ray Shoulder 2 or More  Views Right    Result Date: 11/25/2024  EXAMINATION: SEE ABOVE XR SHOULDER COMPLETE 2 OR MORE VIEWS RIGHT CLINICAL HISTORY: Unspecified injury of right shoulder and upper arm, initial encounter TECHNIQUE: Two or three views of the right shoulder were performed. COMPARISON: None FINDINGS: Glenohumeral joint space is intact.  The degenerative changes seen at the acromioclavicular joint space.  There is a little sclerosis in the proximal right humerus. Electronically signed by: Bruno Wilkinson MD Date:    11/25/2024 Time:    11:08    X-Ray Lumbar Spine 2 Or 3 Views    Result Date: 11/25/2024  EXAMINATION: XR LUMBAR SPINE 2 OR 3 VIEWS CLINICAL HISTORY: Low back pain, no red flags, no prior management;WORKERS COMP;  Dorsalgia, unspecified TECHNIQUE: Lumbar spine 2 or three views COMPARISON: None FINDINGS: Vertebral bodies are intact. There is narrowing of the L3-L4 and L4-L5 disc spaces. Bony spurring is seen. No collapse or destruction is noted.     See above Electronically signed by: Bruno Wilkinson MD Date:    11/25/2024 Time:    11:07       Assessment:      1. Encounter related to worker's compensation claim    2. Injury of right shoulder, subsequent encounter    3. Fall from ground level    4. Contusion of back, right, subsequent encounter    5. 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  She had a slip and fall and reported right shoulder pain as well as right low back pain which is significantly improved however not resolved.  She has been taking naproxen, Tylenol.  Will add tizanidine as muscle relaxant for the shoulder and back.  She can work regular duty taking care not to aggravate the affected area and return to clinic in 1 week to assure continued improvement and resolution of symptoms before being discharged.  I have reviewed the x-rays and x-rays are negative for acute bony injury or fracture.    Medications  Ordered This Encounter   Medications    tiZANidine (ZANAFLEX) 4 MG tablet     Sig: Take 1 tablet (4 mg total) by mouth every 8 (eight) hours. for 7 days     Dispense:  21 tablet     Refill:  0     Patient Instructions: Attention not to aggravate affected area, Apply ice 24-48 hours then apply heat/warm soaks   Restrictions: Regular Duty  Follow up in about 1 week (around 12/10/2024).

## 2024-12-03 NOTE — LETTER
[facility] [place] [id number] [address] [facility] [facility], [address]  [place] [address]  Phone: [phone]  Fax: [phone]  [facility]: [id number]    Pt Name: Octavia Arrington  Injury Date: 11/21/2024   Employee ID: [id number] Date of Treatment: 12/03/2024   Company: [employer] [place]      Appointment Time: 09:00 am Arrived: 09:30 am   Provider: Nathaniel Lagos MD Time Out:10:40 am     Office Treatment:   1. Encounter related to worker's compensation claim    2. Injury of right shoulder, subsequent encounter    3. Fall from ground level    4. Contusion of back, right, subsequent encounter    5. Strain of right shoulder, subsequent encounter      Medications Ordered This Encounter   Medications    tiZANidine (ZANAFLEX) 4 MG tablet      Patient Instructions: Attention not to aggravate affected area, Apply ice 24-48 hours then apply heat/warm soaks    Restrictions: Regular Duty     Return Appointment:12/10/2024   03:30 pm  LEONARDO

## 2024-12-11 ENCOUNTER — TELEPHONE (OUTPATIENT)
Dept: URGENT CARE | Facility: CLINIC | Age: 64
End: 2024-12-11
Payer: COMMERCIAL

## 2024-12-12 NOTE — TELEPHONE ENCOUNTER
Called the patient in reference to her missed Occ Health appointment and the patient verbalized  the new [facility] health  Appt, date & time. Patient states that she had death in her family. YVETTE

## 2024-12-18 ENCOUNTER — OFFICE VISIT (OUTPATIENT)
Dept: URGENT CARE | Facility: CLINIC | Age: 64
End: 2024-12-18
Payer: COMMERCIAL

## 2024-12-18 VITALS
HEART RATE: 74 BPM | SYSTOLIC BLOOD PRESSURE: 136 MMHG | DIASTOLIC BLOOD PRESSURE: 88 MMHG | OXYGEN SATURATION: 96 % | TEMPERATURE: 98 F | BODY MASS INDEX: 42.43 KG/M2 | WEIGHT: 264 LBS | HEIGHT: 66 IN | RESPIRATION RATE: 17 BRPM

## 2024-12-18 DIAGNOSIS — S20.221D: ICD-10-CM

## 2024-12-18 DIAGNOSIS — Z02.6 ENCOUNTER RELATED TO WORKER'S COMPENSATION CLAIM: Primary | ICD-10-CM
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
## 2024-12-18 DIAGNOSIS — S49.91XD INJURY OF RIGHT SHOULDER, SUBSEQUENT ENCOUNTER: ICD-10-CM

## 2024-12-18 PROCEDURE — 99214 OFFICE O/P EST MOD 30 MIN: CPT | Mod: S$GLB,,, | Performed by: EMERGENCY MEDICINE

## 2024-12-18 NOTE — LETTER
[facility] [place] [address] [facility], [address]  [place] [address]  Phone: [phone]  Fax: [phone]  [facility]: [id number]    Pt Name: Octavia Arrington  Injury Date: 11/21/2024   Employee ID: [id number] Date of Treatment: 12/18/2024   Company: [employer] to [employer] [place]      Appointment Time: 3:30pm Arrived: 3:20pm   Provider: Nathaniel Lagos MD Time Out:4:00pm     Office Treatment:   1. Encounter related to worker's compensation claim    2. Injury of right shoulder, subsequent encounter    3. Fall from ground level    4. Contusion of back, right, subsequent encounter          Patient Instructions: Attention not to aggravate affected area    Restrictions: Regular Duty, Discharged from Occupational Health     Return Appointment:   Follow up if symptoms worsen or fail to improve.   db

## 2024-12-18 NOTE — PROGRESS NOTES
Subjective:      Patient ID: Octavia Arrington is a 64 y.o. female.    Chief Complaint: No chief complaint on file.    Patient's place of employment - [employer]  Patient's job title -assistant    Date of Injury - 11/21/2024  Body part injured - Back , right shoulder and hip  Current work status per last visit - Light duty  Improved, same, or worse - improved  Pain Scale right now (1-10) -  1/10    Patient very much improved with no significant pain to the head neck back or extremities.  She is and has been working regular duty and doing well.  She does report some mild soreness to the proximal forearms potentially from lifting or compensating.  Exam is normal and symptoms resolved.  She will continue to work regular duty without restrictions knowing that she may return p.r.n..  She does have some time off over the next few weeks with the holidays.  Discharge from occupational health.      Back Pain  Pertinent negatives include no abdominal pain, chest pain, dysuria, fever or numbness.       ros  Constitution: Negative for chills, fatigue and fever.   HENT:  Negative for ear pain, sinus pain and sore throat.    Neck: Negative for neck pain and neck stiffness.   Cardiovascular:  Negative for chest pain, palpitations and sob on exertion.   Eyes:  Negative for eye pain and vision loss.   Respiratory:  Negative for cough, shortness of breath and asthma.    Gastrointestinal:  Negative for abdominal pain, nausea, vomiting and diarrhea.   Genitourinary:  Negative for dysuria, frequency and hematuria.   Musculoskeletal:  Positive for muscle ache (Proximal forearms bilaterally). Negative for pain, abnormal ROM of joint and back pain.   Skin:  Negative for rash and wound.   Allergic/Immunologic: Negative for seasonal allergies and asthma.   Neurological:  Negative for dizziness, light-headedness, altered mental status and numbness.   Psychiatric/Behavioral:  Negative for altered mental status and confusion.       Objective:     Physical Exam  Vitals and nursing note reviewed.   Constitutional:       General: She is not in acute distress.     Appearance: Normal appearance. She is well-developed.   HENT:      Head: Normocephalic and atraumatic.      Right Ear: Hearing and external ear normal.      Left Ear: Hearing and external ear normal.      Nose: Nose normal. No nasal deformity.   Eyes:      General: Lids are normal.      Conjunctiva/sclera: Conjunctivae normal.      Right eye: Right conjunctiva is not injected.      Left eye: Left conjunctiva is not injected.   Neck:      Trachea: Trachea normal.   Cardiovascular:      Pulses: Normal pulses.           Dorsalis pedis pulses are 2+ on the right side and 2+ on the left side.        Posterior tibial pulses are 2+ on the right side and 2+ on the left side.   Pulmonary:      Effort: Pulmonary effort is normal. No respiratory distress.      Breath sounds: No stridor.   Musculoskeletal:      Right shoulder: No swelling, deformity or tenderness. Normal range of motion. Normal strength. Normal pulse.        Arms:       Cervical back: Normal and normal range of motion. No spinous process tenderness or muscular tenderness.      Thoracic back: No tenderness. Normal range of motion.      Lumbar back: No deformity or tenderness. Normal range of motion. Negative right straight leg raise test and negative left straight leg raise test.        Back:       Comments: Areas of concern with prior pain or resolved with normal range of motion and no tenderness to palpation.   Skin:     General: Skin is warm and dry.      Findings: No abrasion or bruising.   Neurological:      General: No focal deficit present.      Mental Status: She is alert.      GCS: GCS eye subscore is 4. GCS verbal subscore is 5. GCS motor subscore is 6.      Sensory: Sensation is intact. No sensory deficit.      Motor: Motor function is intact. No weakness (BLE strength 5/5).      Deep Tendon Reflexes: Reflexes are  normal and symmetric.      Reflex Scores:       Patellar reflexes are 2+ on the right side and 2+ on the left side.       Achilles reflexes are 2+ on the right side and 2+ on the left side.  Psychiatric:         Attention and Perception: She is attentive.         Speech: Speech normal.         Behavior: Behavior normal.         Thought Content: Thought content normal.      X-ray Shoulder 2 or More Views Right    Result Date: 11/25/2024  EXAMINATION: SEE ABOVE XR SHOULDER COMPLETE 2 OR MORE VIEWS RIGHT CLINICAL HISTORY: Unspecified injury of right shoulder and upper arm, initial encounter TECHNIQUE: Two or three views of the right shoulder were performed. COMPARISON: None FINDINGS: Glenohumeral joint space is intact.  The degenerative changes seen at the acromioclavicular joint space.  There is a little sclerosis in the proximal right humerus. Electronically signed by: Bruno Wilkinson MD Date:    11/25/2024 Time:    11:08    X-Ray Lumbar Spine 2 Or 3 Views    Result Date: 11/25/2024  EXAMINATION: XR LUMBAR SPINE 2 OR 3 VIEWS CLINICAL HISTORY: Low back pain, no red flags, no prior management;WORKERS COMP;  Dorsalgia, unspecified TECHNIQUE: Lumbar spine 2 or three views COMPARISON: None FINDINGS: Vertebral bodies are intact. There is narrowing of the L3-L4 and L4-L5 disc spaces. Bony spurring is seen. No collapse or destruction is noted.     See above Electronically signed by: Bruno Wilkinson MD Date:    11/25/2024 Time:    11:07     Assessment:      1. Encounter related to worker's compensation claim    2. Injury of right shoulder, subsequent encounter    3. Fall from ground level    4. Contusion of back, right, subsequent encounter      Plan:     Patient very much improved with no significant pain to the head neck back or extremities.  She is and has been working regular duty and doing well.  She does report some mild soreness to the proximal forearms potentially from lifting or compensating.  Exam is normal and  symptoms resolved.  She will continue to work regular duty without restrictions knowing that she may return p.r.n..  She does have some time off over the next few weeks with the holidays.  Discharge from occupational health.     Patient Instructions: Attention not to aggravate affected area   Restrictions: Regular Duty, Discharged from Occupational Health  Follow up if symptoms worsen or fail to improve.

## 2025-01-06 ENCOUNTER — HOSPITAL ENCOUNTER (OUTPATIENT)
Dept: RADIOLOGY | Facility: HOSPITAL | Age: 65
Discharge: HOME | End: 2025-01-06
Attending: INTERNAL MEDICINE
Payer: COMMERCIAL

## 2025-01-06 DIAGNOSIS — Z12.31 ENCOUNTER FOR SCREENING MAMMOGRAM FOR BREAST CANCER: ICD-10-CM

## 2025-01-06 PROCEDURE — 77063 BREAST TOMOSYNTHESIS BI: CPT | Mod: TC

## 2025-01-06 PROCEDURE — 77067 SCR MAMMO BI INCL CAD: CPT | Mod: 26,,, | Performed by: RADIOLOGY

## 2025-01-06 PROCEDURE — 77063 BREAST TOMOSYNTHESIS BI: CPT | Mod: 26,,, | Performed by: RADIOLOGY

## 2025-02-27 ENCOUNTER — OFFICE VISIT (OUTPATIENT)
Dept: URGENT CARE | Facility: CLINIC | Age: 65
End: 2025-02-27
Payer: COMMERCIAL

## 2025-02-27 VITALS
TEMPERATURE: 101 F | RESPIRATION RATE: 20 BRPM | HEIGHT: 66 IN | DIASTOLIC BLOOD PRESSURE: 84 MMHG | OXYGEN SATURATION: 95 % | WEIGHT: 260 LBS | SYSTOLIC BLOOD PRESSURE: 144 MMHG | BODY MASS INDEX: 41.78 KG/M2 | HEART RATE: 106 BPM

## 2025-02-27 DIAGNOSIS — R52 BODY ACHES: ICD-10-CM

## 2025-02-27 DIAGNOSIS — R50.9 FEVER, UNSPECIFIED FEVER CAUSE: ICD-10-CM

## 2025-02-27 DIAGNOSIS — U07.1 COVID-19: Primary | ICD-10-CM

## 2025-02-27 DIAGNOSIS — U07.1 COVID-19 VIRUS DETECTED: ICD-10-CM

## 2025-02-27 LAB
CTP QC/QA: YES
CTP QC/QA: YES
POC MOLECULAR INFLUENZA A AGN: NEGATIVE
POC MOLECULAR INFLUENZA B AGN: NEGATIVE
SARS CORONAVIRUS 2 ANTIGEN: POSITIVE

## 2025-02-27 RX ORDER — KETOROLAC TROMETHAMINE 30 MG/ML
30 INJECTION, SOLUTION INTRAMUSCULAR; INTRAVENOUS
Status: COMPLETED | OUTPATIENT
Start: 2025-02-27 | End: 2025-02-27

## 2025-02-27 RX ORDER — ACETAMINOPHEN 500 MG
1000 TABLET ORAL
Status: COMPLETED | OUTPATIENT
Start: 2025-02-27 | End: 2025-02-27

## 2025-02-27 RX ADMIN — Medication 1000 MG: at 06:02

## 2025-02-27 RX ADMIN — KETOROLAC TROMETHAMINE 30 MG: 30 INJECTION, SOLUTION INTRAMUSCULAR; INTRAVENOUS at 06:02

## 2025-02-28 ENCOUNTER — NURSE TRIAGE (OUTPATIENT)
Dept: ADMINISTRATIVE | Facility: CLINIC | Age: 65
End: 2025-02-28
Payer: COMMERCIAL

## 2025-02-28 NOTE — PROGRESS NOTES
"Subjective:      Patient ID: Octavia Arrington is a 65 y.o. female.    Vitals:  height is 5' 6" (1.676 m) and weight is 117.9 kg (260 lb). Her tympanic temperature is 100.9 °F (38.3 °C) (abnormal). Her blood pressure is 144/84 (abnormal) and her pulse is 106. Her respiration is 20 and oxygen saturation is 95%.     Chief Complaint: Cough    65-year-old female here for headaches, body aches, coughing, congestion, postnasal drip, diarrhea for the past 2 days.  She has been taking TheraFlu and Excedrin.  Possible sick contacts at work.  Denies nausea, vomiting, abdominal pain, chest pain, SOB, ear pain, sore throat, urinary symptoms.    Cough  This is a new problem. The current episode started yesterday. The problem has been gradually worsening. The problem occurs constantly. The cough is Productive of sputum. Associated symptoms include chills, a fever, headaches, myalgias, nasal congestion, postnasal drip and a sore throat. Pertinent negatives include no chest pain, ear pain or shortness of breath. Nothing aggravates the symptoms. She has tried OTC cough suppressant for the symptoms. The treatment provided mild relief. Her past medical history is significant for bronchitis.       Constitution: Positive for activity change, appetite change, chills, fatigue and fever.   HENT:  Positive for postnasal drip, sinus pressure and sore throat. Negative for ear pain.    Neck: Negative for neck pain and neck stiffness.   Cardiovascular:  Negative for chest pain.   Respiratory:  Positive for cough. Negative for shortness of breath.    Gastrointestinal:  Positive for abdominal pain and diarrhea. Negative for nausea and vomiting.   Genitourinary:  Negative for dysuria, frequency and urgency.   Musculoskeletal:  Positive for muscle ache.   Neurological:  Positive for headaches.      Objective:     Physical Exam   Constitutional: She is oriented to person, place, and time. She appears well-developed.   HENT:   Head: Normocephalic and " atraumatic.   Ears:   Right Ear: Tympanic membrane, external ear and ear canal normal.   Left Ear: Tympanic membrane, external ear and ear canal normal.   Nose: Rhinorrhea and congestion present.   Mouth/Throat: Oropharynx is clear and moist. Mucous membranes are moist. Oropharynx is clear.   Eyes: Conjunctivae, EOM and lids are normal. Pupils are equal, round, and reactive to light.   Neck: Trachea normal and phonation normal. Neck supple.   Cardiovascular: Regular rhythm, normal heart sounds and normal pulses. Tachycardia present.   Pulmonary/Chest: Effort normal and breath sounds normal.   Musculoskeletal: Normal range of motion.         General: Normal range of motion.   Neurological: no focal deficit. She is alert and oriented to person, place, and time.   Skin: Skin is warm, dry and intact. Capillary refill takes less than 2 seconds.   Psychiatric: Her speech is normal and behavior is normal. Judgment and thought content normal.   Nursing note and vitals reviewed.    Results for orders placed or performed in visit on 02/27/25   POCT Influenza A/B MOLECULAR    Collection Time: 02/27/25  6:55 PM   Result Value Ref Range    POC Molecular Influenza A Ag Negative Negative    POC Molecular Influenza B Ag Negative Negative     Acceptable Yes    SARS Coronavirus 2 Antigen, POCT Manual Read    Collection Time: 02/27/25  6:56 PM   Result Value Ref Range    SARS Coronavirus 2 Antigen Positive (A) Negative, Presumptive Negative     Acceptable Yes      Assessment:     1. COVID-19    2. Fever, unspecified fever cause    3. Body aches        Plan:       COVID-19  -     POCT Influenza A/B MOLECULAR  -     SARS Coronavirus 2 Antigen, POCT Manual Read  -     nirmatrelvir-ritonavir 300 mg (150 mg x 2)-100 mg copackaged tablets (EUA); Take 3 tablets by mouth 2 (two) times daily for 5 days. Each dose contains 2 nirmatrelvir (pink tablets) and 1 ritonavir (white tablet). Take all 3 tablets together   Dispense: 30 tablet; Refill: 0    Fever, unspecified fever cause  -     acetaminophen tablet 1,000 mg    Body aches  -     ketorolac injection 30 mg    Discussed supportive care.  Will send Paxlovid to pharmacy.            Patient Instructions   You can go back to your normal activities when, for at least 24 hours, both are true:  Your symptoms are getting better overall, and  You have not had a fever (and are not using fever-reducing medication).  When you go back to your normal activities, take added precaution over the next 5 days, such as taking additional steps for  air, hygiene, masks, physical distancing, and/or testing when you will be around other people indoors.  Keep in mind that you may still be able to spread the virus that made you sick, even if you are feeling better. You are likely to be less contagious at this time, depending on factors like how long you were sick or how sick you were.  If you develop a fever or you start to feel worse after you have gone back to normal activities, stay home and away from others again until, for at least 24 hours, both are true: your symptoms are improving overall, and you have not had a fever (and are not using fever-reducing medication). Then take added precaution for the next 5 days.      - Rest.    - Drink plenty of fluids.  - Viral upper respiratory infections typically run their course in 10-14 days.      - You can take over-the-counter claritin, zyrtec, allegra, or xyzal as directed. These are antihistamines that can help with runny nose, nasal congestion, sneezing, and helps to dry up post-nasal drip, which usually causes sore throat and cough.              - If you do NOT have high blood pressure, you may use a decongestant form (D)  of this medication (ie. Claritin- D, zyrtec-D, allegra-D) or if you do not take the D form, you can take sudafed (pseudoephedrine) over the counter, which is a decongestant. Do NOT take two decongestant (D) medications  at the same time (such as mucinex-D and claritin-D or plain sudafed and claritin D)    - If you DO have Hypertension, anxiety, or palpitations, it is safe to take Coricidin HBP for relief of sinus symptoms.     - You can use Flonase (fluticasone) nasal spray as directed for sinus congestion and postnasal drip. This is a steroid nasal spray that works locally over time to decrease the inflammation in your nose/sinuses and help with allergic symptoms. This is not an quick- relief spray like afrin, but it works well if used daily.  Discontinue if you develop nose bleed  - use nasal saline prior to Flonase.  - Use Ocean Spray Nasal Saline 1-3 puffs each nostril every 2-3 hours then blow out onto tissue. This is to irrigate the nasal passage way to clear the sinus openings. Use until sinus problem resolved.     - you can take plain Mucinex (guaifenesin) 1200 mg twice a day to help loosen mucous.      -warm salt water gargles can help with sore throat     - warm tea with honey can help with cough. Honey is a natural cough suppressant.     - Dextromethorphan (DM) is a cough suppressant over the counter (ie. mucinex DM, robitussin, delsym; dayquil/nyquil has DM as well.)        - Follow up with your PCP or specialty clinic as directed in the next 1-2 weeks if not improved or as needed.  You can call [phone] to schedule an appointment with the appropriate provider.       - Go to the ER if you develop new or worsening symptoms.      - You must understand that you have received an Urgent Care treatment only and that you may be released before all of your medical problems are known or treated.   - You, the patient, will arrange for follow up care as instructed.   - If your condition worsens or fails to improve we recommend that you receive another evaluation at the ER immediately or contact your PCP to discuss your concerns or return here.

## 2025-02-28 NOTE — PATIENT INSTRUCTIONS
You can go back to your normal activities when, for at least 24 hours, both are true:  Your symptoms are getting better overall, and  You have not had a fever (and are not using fever-reducing medication).  When you go back to your normal activities, take added precaution over the next 5 days, such as taking additional steps for  air, hygiene, masks, physical distancing, and/or testing when you will be around other people indoors.  Keep in mind that you may still be able to spread the virus that made you sick, even if you are feeling better. You are likely to be less contagious at this time, depending on factors like how long you were sick or how sick you were.  If you develop a fever or you start to feel worse after you have gone back to normal activities, stay home and away from others again until, for at least 24 hours, both are true: your symptoms are improving overall, and you have not had a fever (and are not using fever-reducing medication). Then take added precaution for the next 5 days.      - Rest.    - Drink plenty of fluids.  - Viral upper respiratory infections typically run their course in 10-14 days.      - You can take over-the-counter claritin, zyrtec, allegra, or xyzal as directed. These are antihistamines that can help with runny nose, nasal congestion, sneezing, and helps to dry up post-nasal drip, which usually causes sore throat and cough.              - If you do NOT have high blood pressure, you may use a decongestant form (D)  of this medication (ie. Claritin- D, zyrtec-D, allegra-D) or if you do not take the D form, you can take sudafed (pseudoephedrine) over the counter, which is a decongestant. Do NOT take two decongestant (D) medications at the same time (such as mucinex-D and claritin-D or plain sudafed and claritin D)    - If you DO have Hypertension, anxiety, or palpitations, it is safe to take Coricidin HBP for relief of sinus symptoms.     - You can use Flonase (fluticasone)  nasal spray as directed for sinus congestion and postnasal drip. This is a steroid nasal spray that works locally over time to decrease the inflammation in your nose/sinuses and help with allergic symptoms. This is not an quick- relief spray like afrin, but it works well if used daily.  Discontinue if you develop nose bleed  - use nasal saline prior to Flonase.  - Use Ocean Spray Nasal Saline 1-3 puffs each nostril every 2-3 hours then blow out onto tissue. This is to irrigate the nasal passage way to clear the sinus openings. Use until sinus problem resolved.     - you can take plain Mucinex (guaifenesin) 1200 mg twice a day to help loosen mucous.      -warm salt water gargles can help with sore throat     - warm tea with honey can help with cough. Honey is a natural cough suppressant.     - Dextromethorphan (DM) is a cough suppressant over the counter (ie. mucinex DM, robitussin, delsym; dayquil/nyquil has DM as well.)        - Follow up with your PCP or specialty clinic as directed in the next 1-2 weeks if not improved or as needed.  You can call [phone] to schedule an appointment with the appropriate provider.       - Go to the ER if you develop new or worsening symptoms.      - You must understand that you have received an Urgent Care treatment only and that you may be released before all of your medical problems are known or treated.   - You, the patient, will arrange for follow up care as instructed.   - If your condition worsens or fails to improve we recommend that you receive another evaluation at the ER immediately or contact your PCP to discuss your concerns or return here.

## 2025-02-28 NOTE — TELEPHONE ENCOUNTER
Patient enrolled in the Covid-19 Home Monitoring program on 02/27/25. Patient states no c/o worsening symptoms but states she was prescribed Paxlovid for management of her symptoms and now has a bitter taste in her mouth since taking her first dose this morning.     Patient advised that Paxlovid may leave a bad taste in her mouth and to continue brushing and flossing while using Paxlovid. Patient also advised to contact the [facility] for any worsening symptoms. Patient states understanding of care advice.     Reason for Disposition   COVID-19 diagnosed by positive lab test (e.g., PCR, rapid self-test kit) and mild symptoms (e.g., cough, fever, others) and no complications or SOB    Additional Information   Negative: SEVERE difficulty breathing (e.g., struggling for each breath, speaks in single words)   Negative: Difficult to awaken or acting confused (e.g., disoriented, slurred speech)   Negative: Bluish (or gray) lips or face now   Negative: Shock suspected (e.g., cold/pale/clammy skin, too weak to stand, low BP, rapid pulse)   Negative: Sounds like a life-threatening emergency to the triager   Negative: Diagnosed or suspected COVID-19 and symptoms lasting 3 or more weeks   Negative: COVID-19 exposure and no symptoms   Negative: COVID-19 vaccine reaction suspected (e.g., fever, headache, muscle aches) occurring 1 to 3 days after getting vaccine   Negative: COVID-19 vaccine, questions about   Negative: Lives with someone known to have influenza (flu test positive) and flu-like symptoms (e.g., cough, runny nose, sore throat, SOB; with or without fever)   Negative: Possible COVID-19 symptoms and triager concerned about severity of symptoms or other causes   Negative: COVID-19 and breastfeeding, questions about   Negative: SEVERE or constant chest pain or pressure  (Exception: Mild central chest pain, present only when coughing.)   Negative: MODERATE difficulty breathing (e.g., speaks in phrases, SOB  even at rest, pulse 100-120)   Negative: Headache and stiff neck (can't touch chin to chest)   Negative: Oxygen level (e.g., pulse oximetry) 90% or lower   Negative: Chest pain or pressure  (Exception: MILD central chest pain, present only when coughing.)   Negative: Drinking very little and dehydration suspected (e.g., no urine > 12 hours, very dry mouth, very lightheaded)   Negative: Patient sounds very sick or weak to the triager   Negative: MILD difficulty breathing (e.g., minimal/no SOB at rest, SOB with walking, pulse <100)   Negative: Fever > 103 F (39.4 C)   Negative: Fever > 101 F (38.3 C) and over 60 years of age   Negative: Fever > 100.0 F (37.8 C) and bedridden (e.g., CVA, chronic illness, recovering from surgery)   Negative: HIGH RISK patient (e.g., weak immune system, age > 64 years, obesity with BMI of 30 or higher, pregnant, chronic lung disease or other chronic medical condition) and COVID symptoms (e.g., cough, fever)  (Exceptions: Already seen by doctor or NP/PA and no new or worsening symptoms.)   Negative: HIGH RISK patient and influenza is widespread in the community and ONE OR MORE respiratory symptoms: cough, sore throat, runny or stuffy nose   Negative: HIGH RISK patient and influenza exposure within the last 7 days and ONE OR MORE respiratory symptoms: cough, sore throat, runny or stuffy nose   Negative: Oxygen level (e.g., pulse oximetry) 91 to 94%   Negative: COVID-19 infection suspected by caller or triager and mild symptoms (cough, fever, or others) and negative COVID-19 rapid test   Negative: Fever present > 3 days (72 hours)   Negative: Fever returns after gone for over 24 hours and symptoms worse or not improved   Negative: Continuous (nonstop) coughing interferes with work or school and no improvement using cough treatment per Care Advice   Negative: Cough present > 3 weeks   Negative: COVID-19 diagnosed by positive lab test (e.g., PCR, rapid self-test kit) and NO symptoms (e.g.,  cough, fever, others)    Protocols used: Coronavirus (COVID-19) Diagnosed or [id number]

## 2025-03-11 ENCOUNTER — NURSE TRIAGE (OUTPATIENT)
Dept: ADMINISTRATIVE | Facility: CLINIC | Age: 65
End: 2025-03-11
Payer: COMMERCIAL

## 2025-03-11 PROCEDURE — 93005 ELECTROCARDIOGRAM TRACING: CPT

## 2025-03-11 PROCEDURE — 99284 EMERGENCY DEPT VISIT MOD MDM: CPT | Mod: 25

## 2025-03-11 PROCEDURE — 93010 ELECTROCARDIOGRAM REPORT: CPT | Mod: ,,, | Performed by: INTERNAL MEDICINE

## 2025-03-11 NOTE — TELEPHONE ENCOUNTER
Pt diagnosed with covid on 2/27. She took paxlovid but the coughing is still bad along with chest tightness and congestion. Having some SOB. No fever. Pt denies wheezing but triage rn can hear some possible wheezing and breathing does not sound normal. Pt sounds SOB.    Dispo- go to ED now. Pt advised and VU and agrees.  Reason for Disposition   MODERATE difficulty breathing (e.g., speaks in phrases, SOB even at rest, pulse 100-120)    Additional Information   Negative: SEVERE difficulty breathing (e.g., struggling for each breath, speaks in single words)   Negative: Difficult to awaken or acting confused (e.g., disoriented, slurred speech)   Negative: Bluish (or gray) lips or face now   Negative: Shock suspected (e.g., cold/pale/clammy skin, too weak to stand, low BP, rapid pulse)   Negative: Sounds like a life-threatening emergency to the triager   Negative: SEVERE or constant chest pain or pressure  (Exception: Mild central chest pain, present only when coughing.)    Protocols used: Coronavirus (COVID-19) Diagnosed or [id number]

## 2025-03-12 ENCOUNTER — HOSPITAL ENCOUNTER (EMERGENCY)
Facility: HOSPITAL | Age: 65
Discharge: HOME | End: 2025-03-12
Attending: STUDENT IN AN ORGANIZED HEALTH CARE EDUCATION/TRAINING PROGRAM
Payer: COMMERCIAL

## 2025-03-12 VITALS
HEIGHT: 65 IN | RESPIRATION RATE: 20 BRPM | BODY MASS INDEX: 42.32 KG/M2 | WEIGHT: 254 LBS | SYSTOLIC BLOOD PRESSURE: 159 MMHG | TEMPERATURE: 98 F | DIASTOLIC BLOOD PRESSURE: 85 MMHG | OXYGEN SATURATION: 98 % | HEART RATE: 76 BPM

## 2025-03-12 DIAGNOSIS — R05.9 COUGH, UNSPECIFIED TYPE: Primary | ICD-10-CM

## 2025-03-12 DIAGNOSIS — R06.02 SOB (SHORTNESS OF BREATH): ICD-10-CM

## 2025-03-12 LAB
OHS QRS DURATION: 76 MS
OHS QTC CALCULATION: 428 MS

## 2025-03-12 RX ORDER — BENZONATATE 100 MG/1
100 CAPSULE ORAL 3 TIMES DAILY PRN
Qty: 30 CAPSULE | Refills: 0 | Status: SHIPPED | OUTPATIENT
Start: 2025-03-12 | End: 2025-03-22

## 2025-03-12 NOTE — ED PROVIDER NOTES
Encounter Date: 3/11/2025       History     Chief Complaint   Patient presents with    Shortness of Breath     Pt arrived to ED with CC SOB, chest tightness, cough x4 days. Dx with COVID last week. Denies N/V/D. VSS, NAD in triage.     HPI    65-year-old female presenting to the emergency department for evaluation of intermittent chest tightness shortness for breath and cough over the last 4 days.  She was diagnosed with COVID on the .  She received Paxlovid and took the entire course.  She had denies significant shortness for breath or chest tightness at this time.  She notes a dry cough.  She denies nausea, vomiting, diarrhea, constipation, abdominal pain, dysuria, hematuria, worsening leg swelling, syncope, chest pain.  No other mitigating or exacerbating factors.  Review of patient's allergies indicates:   Allergen Reactions    Bactrim [sulfamethoxazole-trimethoprim] Hives, Swelling and Rash    Sulfa (sulfonamide antibiotics) Itching    Shellfish containing products Other (See Comments)     Mild itching    Iodine      Causes burning sensation on skin    Losartan Rash and Blisters     Causes lips to blister with a rash     Past Medical History:   Diagnosis Date    Acromioclavicular joint arthritis 8/15/2023    Cataract     Glaucoma     Hypertension     Uveitis      Past Surgical History:   Procedure Laterality Date    BREAST BIOPSY Bilateral     ex bx/ age 16 and 22    BREAST BIOPSY Right 10/2021    core bx    BREAST SURGERY  Biopsy last 10/29/20    BUNIONECTOMY Right      SECTION      HERNIA REPAIR      At age 14    HYSTERECTOMY  2000    JOINT REPLACEMENT  R-. L-    Knee replacements    PARTIAL HYSTERECTOMY          REPAIR, HERNIA, INGUINAL Right         ROTATOR CUFF REPAIR      SMALL INTESTINE SURGERY      TOTAL REPLACEMENT OF BOTH KNEES USING COMPUTER-ASSISTED NAVIGATION      R in  and L in     TUBAL LIGATION  1991     Family History   Problem  Relation Name Age of Onset    Pacemaker/defibrilator Mother      Cataracts Father      Prostate cancer Father      Heart disease Father      Amblyopia Neg Hx      Blindness Neg Hx      Glaucoma Neg Hx      Macular degeneration Neg Hx      Retinal detachment Neg Hx      Strabismus Neg Hx      Diabetes Neg Hx       Social History[1]  Review of Systems  See HPI  Physical Exam     Initial Vitals [03/11/25 2302]   BP Pulse Resp Temp SpO2   130/74 87 18 98 °F (36.7 °C) 98 %      MAP       --         Physical Exam    Nursing note and vitals reviewed.  Constitutional: She appears well-developed and well-nourished. She is not diaphoretic. No distress.   HENT:   Head: Normocephalic and atraumatic.   Right Ear: External ear normal.   Left Ear: External ear normal.   Nose: Nose normal.   Eyes: Conjunctivae are normal. No scleral icterus.   Neck: Neck supple. No tracheal deviation present.   Normal range of motion.  Cardiovascular:  Normal rate, regular rhythm, normal heart sounds and intact distal pulses.     Exam reveals no gallop and no friction rub.       No murmur heard.  Pulmonary/Chest: Breath sounds normal. No respiratory distress. She has no wheezes. She has no rhonchi. She has no rales.   Speaking in full sentences.  No respiratory distress noted.   Abdominal: Abdomen is soft. Bowel sounds are normal. There is no abdominal tenderness.   Musculoskeletal:         General: No edema.      Cervical back: Normal range of motion and neck supple.     Neurological: She is alert and oriented to person, place, and time. GCS score is 15. GCS eye subscore is 4. GCS verbal subscore is 5. GCS motor subscore is 6.   Skin: Skin is warm and dry.   No jaundice   Psychiatric: She has a normal mood and affect. Thought content normal.         ED Course   Procedures  Labs Reviewed - No data to display       Imaging Results              X-Ray Chest 1 View (Final result)  Result time 03/12/25 01:18:10      Final result by Teresa Santana,  MD (03/12/25 01:18:10)                   Impression:      No acute thoracic abnormality.  No significant change.      Electronically signed by: Teresa Santana  Date:    03/12/2025  Time:    01:18               Narrative:    EXAMINATION:  CHEST ONE VIEW    CLINICAL HISTORY:  Shortness of breath    TECHNIQUE:  One view of the chest.    COMPARISON:  07/09/2024    FINDINGS:  The cardiac silhouette is within normal limits.  There is tortuosity of the descending thoracic aorta.  Vascular calcifications seen at the aortic knob.  There is no focal consolidation, pneumothorax, or pleural effusion.  Mild dextroscoliosis and spondylitic changes are present.  The bones are osteopenic.                                       Medications - No data to display  Medical Decision Making  Amount and/or Complexity of Data Reviewed  Radiology:  Decision-making details documented in ED Course.    Risk  OTC drugs.  Prescription drug management.               ED Course as of 03/12/25 0149   Wed Mar 12, 2025   0145 X-Ray Chest 1 View  FINDINGS:  The cardiac silhouette is within normal limits.  There is tortuosity of the descending thoracic aorta.  Vascular calcifications seen at the aortic knob.  There is no focal consolidation, pneumothorax, or pleural effusion.  Mild dextroscoliosis and spondylitic changes are present.  The bones are osteopenic.     Impression:     No acute thoracic abnormality.  No significant change.         [CC]   0145 Differential Diagnosis includes, but is not limited to:  Viral URI, Strep pharyngitis, viral pharyngitis, foreign body aspiration/ingestion, bronchitis, asthma exacerbation, CHF exacerbation, COPD exacerbation, allergy/atopy, influenza, pertussis, PE, pneumonia, lung abscess, fungal infection, TB, epiglottitis.  [CC]   0146 65-year-old presenting to the emergency department for evaluation of persistent cough after COVID-19 infection diagnosed on the 27th of February this year.  Chest x-ray without  evidence of pleural effusion, pulmonary edema, pneumothorax, focal consolidation.  She is afebrile.  She is not tachypneic.  Satting well on room air.  Doubt pneumonia.  No significant lower extremity edema, no rales on exam, doubt CHF exacerbation.  No wheezing on exam.  We will start Tessalon Perles and lozenges.  Likely continued mild inflammation after COVID-19.  Counseled to follow up with a doctor for further evaluation.  Do not believe antibiotics are indicated though they were considered.  She otherwise looks well and plan for discharge.  Strict return precautions given. I believe patient is appropriate for discharge and continued outpatient evaulation/treatment.  I discussed with the patient/family the diagnosis, treatment plan, indications for return to the emergency department, and for expected follow-up. The patient/family verbalized an understanding. The patient/family  asked if there are any questions or concerns. We discuss the case, until all issues are addressed to the patient/family's satisfaction. Patient/family understands and is agreeable to the plan. Patient is stable and ready for discharge.   [CC]   0149 EKG: Rate 86, regular rhythm, sinus rhythm, intervals within normal limits, no ST elevations or depressions noted.  Similar to previous.  Normal EKG.  Interpreted by me, reviewed by me. [CC]      ED Course User Index  [CC] Martin Perez MD                           Clinical Impression:  Final diagnoses:  [R06.02] SOB (shortness of breath)  [R05.9] Cough, unspecified type (Primary)          ED Disposition Condition    Discharge Stable          ED Prescriptions       Medication Sig Dispense Start Date End Date Auth. Provider    benzonatate (TESSALON) 100 MG capsule Take 1 capsule (100 mg total) by mouth 3 (three) times daily as needed for Cough. 30 capsule 3/12/2025 3/22/2025 Martin Perez MD    benzocaine-menthoL 15-3.6 mg Lozg 1 lozenge by Mucous Membrane route every 4 (four)  hours as needed (sore throat). 18 lozenge 3/12/2025 -- Martin Perez MD          Follow-up Information       Follow up With Specialties Details Why Contact Info    Do Nguyen MD Internal Medicine, Wound Care Schedule an appointment as soon as possible for a visit in 5 days  [address] [place] [address]  [address]  [place] [address]  [phone]      [facility] Emergency Dept Emergency Medicine Go to  If symptoms worsen [address] [facility] [place] [address] [phone]               [1]   Social History  Tobacco Use    Smoking status: Never     Passive exposure: Never    Smokeless tobacco: Never    Tobacco comments:     db   Substance Use Topics    Alcohol use: Never     Comment: occasionally    Drug use: Never        Martin Perez MD  03/12/25 0149

## 2025-03-12 NOTE — Clinical Note
"Octavia Arrington (Dianne) was seen and treated in our emergency department on 3/11/2025.  She may return to work on 03/17/2025.       If you have any questions or concerns, please don't hesitate to call.       RN    "

## 2025-03-12 NOTE — ED TRIAGE NOTES
Pt presents to ED for evaluation of SOB with associated frequent productive dry cough and intermittent chest tightness r/t cough. Pt reports dx of Covid. Pt denies significant SOB or chest tightness at this time Pt AAOx4

## 2025-03-16 ENCOUNTER — NURSE TRIAGE (OUTPATIENT)
Dept: ADMINISTRATIVE | Facility: CLINIC | Age: 65
End: 2025-03-16
Payer: COMMERCIAL

## 2025-03-16 ENCOUNTER — OFFICE VISIT (OUTPATIENT)
Dept: URGENT CARE | Facility: CLINIC | Age: 65
End: 2025-03-16
Payer: COMMERCIAL

## 2025-03-16 VITALS
TEMPERATURE: 99 F | HEART RATE: 95 BPM | OXYGEN SATURATION: 98 % | BODY MASS INDEX: 43.09 KG/M2 | DIASTOLIC BLOOD PRESSURE: 103 MMHG | WEIGHT: 258.63 LBS | HEIGHT: 65 IN | RESPIRATION RATE: 22 BRPM | SYSTOLIC BLOOD PRESSURE: 172 MMHG

## 2025-03-16 DIAGNOSIS — J02.9 SORE THROAT: ICD-10-CM

## 2025-03-16 DIAGNOSIS — J02.0 STREP THROAT: Primary | ICD-10-CM

## 2025-03-16 DIAGNOSIS — M94.0 ACUTE COSTOCHONDRITIS: ICD-10-CM

## 2025-03-16 DIAGNOSIS — R05.1 ACUTE COUGH: ICD-10-CM

## 2025-03-16 LAB
CTP QC/QA: YES
CTP QC/QA: YES
MOLECULAR STREP A: POSITIVE
POC MOLECULAR INFLUENZA A AGN: NEGATIVE
POC MOLECULAR INFLUENZA B AGN: NEGATIVE

## 2025-03-16 PROCEDURE — 87651 STREP A DNA AMP PROBE: CPT | Mod: QW,S$GLB,, | Performed by: NURSE PRACTITIONER

## 2025-03-16 PROCEDURE — 96372 THER/PROPH/DIAG INJ SC/IM: CPT | Mod: S$GLB,,, | Performed by: NURSE PRACTITIONER

## 2025-03-16 PROCEDURE — 99214 OFFICE O/P EST MOD 30 MIN: CPT | Mod: 25,S$GLB,, | Performed by: NURSE PRACTITIONER

## 2025-03-16 PROCEDURE — 87502 INFLUENZA DNA AMP PROBE: CPT | Mod: QW,S$GLB,, | Performed by: NURSE PRACTITIONER

## 2025-03-16 RX ORDER — METHOCARBAMOL 500 MG/1
500 TABLET, FILM COATED ORAL 3 TIMES DAILY
Qty: 30 TABLET | Refills: 0 | Status: SHIPPED | OUTPATIENT
Start: 2025-03-16 | End: 2025-03-26

## 2025-03-16 RX ORDER — PROMETHAZINE HYDROCHLORIDE AND DEXTROMETHORPHAN HYDROBROMIDE 6.25; 15 MG/5ML; MG/5ML
5 SYRUP ORAL EVERY 6 HOURS PRN
Qty: 118 ML | Refills: 0 | Status: SHIPPED | OUTPATIENT
Start: 2025-03-16 | End: 2025-03-26

## 2025-03-16 RX ORDER — BENZONATATE 100 MG/1
200 CAPSULE ORAL 3 TIMES DAILY PRN
Qty: 60 CAPSULE | Refills: 0 | Status: SHIPPED | OUTPATIENT
Start: 2025-03-16 | End: 2025-03-26

## 2025-03-16 RX ORDER — NAPROXEN 500 MG/1
500 TABLET ORAL 2 TIMES DAILY
Qty: 20 TABLET | Refills: 0 | Status: SHIPPED | OUTPATIENT
Start: 2025-03-16 | End: 2025-03-26

## 2025-03-16 RX ORDER — AMOXICILLIN 500 MG/1
500 TABLET, FILM COATED ORAL EVERY 12 HOURS
Qty: 20 TABLET | Refills: 0 | Status: SHIPPED | OUTPATIENT
Start: 2025-03-16 | End: 2025-03-26

## 2025-03-16 RX ORDER — KETOROLAC TROMETHAMINE 30 MG/ML
30 INJECTION, SOLUTION INTRAMUSCULAR; INTRAVENOUS
Status: COMPLETED | OUTPATIENT
Start: 2025-03-16 | End: 2025-03-16

## 2025-03-16 RX ADMIN — KETOROLAC TROMETHAMINE 30 MG: 30 INJECTION, SOLUTION INTRAMUSCULAR; INTRAVENOUS at 10:03

## 2025-03-16 NOTE — PROGRESS NOTES
"Subjective:      Patient ID: Octavia Arrington is a 65 y.o. female.    Vitals:  height is 5' 5" (1.651 m) and weight is 117.3 kg (258 lb 9.6 oz). Her oral temperature is 99.3 °F (37.4 °C). Her blood pressure is 172/103 (abnormal) and her pulse is 95. Her respiration is 22 (abnormal) and oxygen saturation is 98%.     Chief Complaint: Sore Throat    Patient is here for sore throat, congestion, headache, body aches and chills. Patient diagnosed with covid 2/27/25 and seen at ER-3/12/25. Patient states sore throat and headache recently changed within 2 days. OTC medication theraflu and tylenol with mild relief. Patient exposed to strep.  Patient reports that cough seems to be 1 of her major symptoms and chest discomfort with coughing patient did complete Paxlovid as prescribed when she was seen on 02/27/2025.    Sore Throat   This is a new problem. Episode onset: 2 days ago. The problem has been gradually worsening. There has been no fever. The pain is at a severity of 8/10. The pain is moderate. Associated symptoms include congestion, headaches and shortness of breath. Associated symptoms comments: Body aches, chills. She has had exposure to strep. Treatments tried: theraflu, tylenol, cough drops. The treatment provided mild relief.       HENT:  Positive for congestion and sore throat.    Respiratory:  Positive for shortness of breath.    Neurological:  Positive for headaches.      Objective:     Physical Exam   Constitutional: She is oriented to person, place, and time. She appears well-developed. She is cooperative.  Non-toxic appearance. She does not appear ill. No distress.   HENT:   Head: Normocephalic and atraumatic.   Ears:   Right Ear: Hearing, tympanic membrane, external ear and ear canal normal.   Left Ear: Hearing, tympanic membrane, external ear and ear canal normal.   Nose: Nose normal. No mucosal edema, rhinorrhea or nasal deformity. No epistaxis. Right sinus exhibits no maxillary sinus tenderness and no " frontal sinus tenderness. Left sinus exhibits no maxillary sinus tenderness and no frontal sinus tenderness.   Mouth/Throat: Uvula is midline and mucous membranes are normal. No trismus in the jaw. Normal dentition. No uvula swelling. Oropharyngeal exudate, posterior oropharyngeal erythema and cobblestoning present. No posterior oropharyngeal edema or tonsillar abscesses. Tonsils are 3+ on the right. Tonsils are 3+ on the left. Tonsillar exudate.   Eyes: Conjunctivae and lids are normal. No scleral icterus.   Neck: Trachea normal and phonation normal. Neck supple. No edema present. No erythema present. No neck rigidity present.   Cardiovascular: Normal rate, regular rhythm, S1 normal, S2 normal, normal heart sounds and normal pulses.   Pulmonary/Chest: Effort normal and breath sounds normal. No respiratory distress. She has no decreased breath sounds. She has no wheezes. She has no rhonchi. She has no rales.   Abdominal: Normal appearance and bowel sounds are normal. Soft. protuberant   Musculoskeletal: Normal range of motion.         General: No deformity. Normal range of motion.   Neurological: no focal deficit. She is alert and oriented to person, place, and time. She exhibits normal muscle tone. Coordination normal.   Skin: Skin is warm, dry, intact, not diaphoretic and not pale. Capillary refill takes less than 2 seconds.   Psychiatric: Her speech is normal and behavior is normal. Mood, judgment and thought content normal.   Nursing note and vitals reviewed.    Results for orders placed or performed in visit on 03/16/25   POCT Strep A, Molecular    Collection Time: 03/16/25 10:19 AM   Result Value Ref Range    Molecular Strep A, POC Positive (A) Negative     Acceptable Yes    POCT Influenza A/B MOLECULAR    Collection Time: 03/16/25 10:28 AM   Result Value Ref Range    POC Molecular Influenza A Ag Negative Negative    POC Molecular Influenza B Ag Negative Negative     Acceptable  Yes        Assessment:     1. Strep throat    2. Acute cough    3. Sore throat    4. Acute costochondritis      Discussed with patient positive for strep throat and management of acute costochondritis at home with cough.  Patient is negative for flu  Plan:       Strep throat  -     amoxicillin (AMOXIL) 500 MG Tab; Take 1 tablet (500 mg total) by mouth every 12 (twelve) hours. for 10 days  Dispense: 20 tablet; Refill: 0    Acute cough  -     POCT Influenza A/B MOLECULAR  -     benzonatate (TESSALON) 100 MG capsule; Take 2 capsules (200 mg total) by mouth 3 (three) times daily as needed for Cough.  Dispense: 60 capsule; Refill: 0  -     promethazine-dextromethorphan (PROMETHAZINE-DM) 6.25-15 mg/5 mL Syrp; Take 5 mLs by mouth every 6 (six) hours as needed (cough).  Dispense: 118 mL; Refill: 0    Sore throat  -     POCT Strep A, Molecular  -     ketorolac injection 30 mg    Acute costochondritis  -     naproxen (NAPROSYN) 500 MG tablet; Take 1 tablet (500 mg total) by mouth 2 (two) times daily. for 10 days  Dispense: 20 tablet; Refill: 0  -     methocarbamoL (ROBAXIN) 500 MG Tab; Take 1 tablet (500 mg total) by mouth 3 (three) times daily. for 10 days  Dispense: 30 tablet; Refill: 0           1) See orders for this visit as documented in the electronic medical record.  2) Symptomatic therapy suggested: use acetaminophen/ibuprofen every 6-8 hours prn pain or fever, push fluids.   3) Call or return to clinic prn if these symptoms worsen or fail to improve as anticipated.    Discussed results/diagnosis/plan with patient in clinic.  We had shared decision making for patient's treatment. Patient verbalized understanding and in agreement with current treatment plan.     Patient was instructed to return for re-evaluation with urgent care or PCP for continued outpatient workup and management if symptoms do not improve/worsening symptoms. Strict ED versus clinic precautions given in depth.    Discharge and follow-up instructions  given verbally/printed with the patient who expressed understanding. The instructions and results are also available on [employer].      - You must understand that you have received an Urgent Care treatment only and that you may be released before all of your medical problems are known or treated.   - You, the patient, will arrange for follow up care as instructed.   - Follow up with your PCP or specialty clinic as directed in the next 1-2 weeks if not improved or as needed.  You can call [phone] to schedule an appointment with the appropriate provider.   - If your condition worsens or fails to improve we recommend that you receive another evaluation at the ER immediately or contact your PCP to discuss your concerns or return here.        PETRONA Stauffer          Patient Instructions   Discharge instructions for strep throat cough and acute costochondritis.  Patient to push fluids and maintain hydrate  Start amoxicillin as prescribed  Start cough medicine to Tessalon Perles and Phenergan DM for management of cough at home  Start Naproxen and Robaxin for  treatment of acute costochondritis.    1) See orders for this visit as documented in the electronic medical record.  2) Symptomatic therapy suggested: use acetaminophen/ibuprofen every 6-8 hours prn pain or fever, push fluids.   3) Call or return to clinic prn if these symptoms worsen or fail to improve as anticipated.    Discussed results/diagnosis/plan with patient in clinic.  We had shared decision making for patient's treatment. Patient verbalized understanding and in agreement with current treatment plan.     Patient was instructed to return for re-evaluation with urgent care or PCP for continued outpatient workup and management if symptoms do not improve/worsening symptoms. Strict ED versus clinic precautions given in depth.    Discharge and follow-up instructions given verbally/printed with the patient who expressed understanding. The instructions  and results are also available on [employer].      - You must understand that you have received an Urgent Care treatment only and that you may be released before all of your medical problems are known or treated.   - You, the patient, will arrange for follow up care as instructed.   - Follow up with your PCP or specialty clinic as directed in the next 1-2 weeks if not improved or as needed.  You can call [phone] to schedule an appointment with the appropriate provider.   - If your condition worsens or fails to improve we recommend that you receive another evaluation at the ER immediately or contact your PCP to discuss your concerns or return here.        PETRONA Stauffer

## 2025-03-16 NOTE — TELEPHONE ENCOUNTER
Reason for Disposition   Health Information question, no triage required and triager able to answer question    Protocols used: Information Only Call-A-AH  Pt is currently sitting in the parking lot of urgent care with her sister and c/o sore throat. Informed pt and cg that UC doesn't open until 9am today. Pt stated she will go home and do a virtual visit.

## 2025-03-16 NOTE — LETTER
March 16, 2025      [facility]  [address]  [place] [address]  Phone: [phone]  Fax: [phone]       Patient: Octavia Arrington   YOB: 1960  Date of Visit: 03/16/2025    To Whom It May Concern:    Kevin Arrington  was at [facility] on 03/16/2025. The patient may return to work/school on 03/20/2025 with no restrictions. If you have any questions or concerns, or if I can be of further assistance, please do not hesitate to contact me.    Sincerely,    Michelle Lovelace, DNP

## 2025-03-16 NOTE — PATIENT INSTRUCTIONS
Discharge instructions for strep throat cough and acute costochondritis.  Patient to push fluids and maintain hydrate  Start amoxicillin as prescribed  Start cough medicine to Tessalon Perles and Phenergan DM for management of cough at home  Start Naproxen and Robaxin for  treatment of acute costochondritis.    1) See orders for this visit as documented in the electronic medical record.  2) Symptomatic therapy suggested: use acetaminophen/ibuprofen every 6-8 hours prn pain or fever, push fluids.   3) Call or return to clinic prn if these symptoms worsen or fail to improve as anticipated.    Discussed results/diagnosis/plan with patient in clinic.  We had shared decision making for patient's treatment. Patient verbalized understanding and in agreement with current treatment plan.     Patient was instructed to return for re-evaluation with urgent care or PCP for continued outpatient workup and management if symptoms do not improve/worsening symptoms. Strict ED versus clinic precautions given in depth.    Discharge and follow-up instructions given verbally/printed with the patient who expressed understanding. The instructions and results are also available on [employer].      - You must understand that you have received an Urgent Care treatment only and that you may be released before all of your medical problems are known or treated.   - You, the patient, will arrange for follow up care as instructed.   - Follow up with your PCP or specialty clinic as directed in the next 1-2 weeks if not improved or as needed.  You can call [phone] to schedule an appointment with the appropriate provider.   - If your condition worsens or fails to improve we recommend that you receive another evaluation at the ER immediately or contact your PCP to discuss your concerns or return here.        PETRONA Stauffer

## 2025-03-19 ENCOUNTER — TELEPHONE (OUTPATIENT)
Dept: URGENT CARE | Facility: CLINIC | Age: 65
End: 2025-03-19
Payer: COMMERCIAL

## 2025-03-19 NOTE — TELEPHONE ENCOUNTER
----- Message from Phillip Ventura sent at 3/19/2025 10:55 AM CDT -----  Contact: [phone] pt    ----- Message -----  From: Graciela Jurado  Sent: 3/19/2025   8:29 AM CDT  To: [facility]#    .1MEDICALADVICE Patient is calling for Medical Advice regarding:Requesting a call back to discuss continued symptoms. Pt states that she has been taking supplements along with medication and is wondering if it is affecting the potency of the medication. How long has patient had these symptoms:Pharmacy name and phone#:[facility]:call backComments:Please call and advisePlease advise patient replies from provider may take up to 48 hours.

## 2025-03-19 NOTE — TELEPHONE ENCOUNTER
Returned call to patient.  She was wondering if over-the-counter vitamin supplements would interact with the prescribed medications from urgent care visit.  I discussed that this should not be any interactions.  She is requesting extended work note.  Have printed out a work note to return on 03/24/2025.  She will pick this up with the  of the clinic.

## 2025-03-27 ENCOUNTER — HOSPITAL ENCOUNTER (OUTPATIENT)
Facility: HOSPITAL | Age: 65
Discharge: HOME | End: 2025-03-28
Attending: EMERGENCY MEDICINE | Admitting: STUDENT IN AN ORGANIZED HEALTH CARE EDUCATION/TRAINING PROGRAM
Payer: COMMERCIAL

## 2025-03-27 DIAGNOSIS — M48.061 SPINAL STENOSIS OF LUMBAR REGION, UNSPECIFIED WHETHER NEUROGENIC CLAUDICATION PRESENT: Primary | ICD-10-CM

## 2025-03-27 DIAGNOSIS — R10.9 ABDOMINAL PAIN: ICD-10-CM

## 2025-03-27 DIAGNOSIS — R10.9 ACUTE RIGHT FLANK PAIN: ICD-10-CM

## 2025-03-27 DIAGNOSIS — N28.1 RENAL CYST, RIGHT: ICD-10-CM

## 2025-03-27 DIAGNOSIS — R07.9 CHEST PAIN: ICD-10-CM

## 2025-03-27 DIAGNOSIS — J90 PLEURAL EFFUSION: ICD-10-CM

## 2025-03-27 DIAGNOSIS — I10 HTN (HYPERTENSION): ICD-10-CM

## 2025-03-27 PROBLEM — M54.9 ACUTE BACK PAIN: Status: ACTIVE | Noted: 2023-08-15

## 2025-03-27 LAB
ABSOLUTE EOSINOPHIL (OHS): 0.04 K/UL
ABSOLUTE MONOCYTE (OHS): 0.62 K/UL (ref 0.3–1)
ABSOLUTE NEUTROPHIL COUNT (OHS): 7.41 K/UL (ref 1.8–7.7)
ALBUMIN SERPL BCP-MCNC: 3.6 G/DL (ref 3.5–5.2)
ALP SERPL-CCNC: 117 UNIT/L (ref 40–150)
ALT SERPL W/O P-5'-P-CCNC: 32 UNIT/L (ref 10–44)
ANION GAP (OHS): 10 MMOL/L (ref 8–16)
AST SERPL-CCNC: 18 UNIT/L (ref 11–45)
BASOPHILS # BLD AUTO: 0.02 K/UL
BASOPHILS NFR BLD AUTO: 0.2 %
BILIRUB SERPL-MCNC: 0.4 MG/DL (ref 0.1–1)
BILIRUB UR QL STRIP.AUTO: NEGATIVE
BNP SERPL-MCNC: 38 PG/ML (ref 0–99)
BUN SERPL-MCNC: 8 MG/DL (ref 8–23)
CALCIUM SERPL-MCNC: 9.4 MG/DL (ref 8.7–10.5)
CHLORIDE SERPL-SCNC: 102 MMOL/L (ref 95–110)
CLARITY UR: CLEAR
CO2 SERPL-SCNC: 25 MMOL/L (ref 23–29)
COLOR UR AUTO: COLORLESS
CREAT SERPL-MCNC: 0.6 MG/DL (ref 0.5–1.4)
ERYTHROCYTE [DISTWIDTH] IN BLOOD BY AUTOMATED COUNT: 14.2 % (ref 11.5–14.5)
GFR SERPLBLD CREATININE-BSD FMLA CKD-EPI: >60 ML/MIN/1.73/M2
GLUCOSE SERPL-MCNC: 101 MG/DL (ref 70–110)
GLUCOSE UR QL STRIP: NEGATIVE
HCT VFR BLD AUTO: 35.9 % (ref 37–48.5)
HGB BLD-MCNC: 11.6 GM/DL (ref 12–16)
HGB UR QL STRIP: NEGATIVE
IMM GRANULOCYTES # BLD AUTO: 0.1 K/UL (ref 0–0.04)
IMM GRANULOCYTES NFR BLD AUTO: 1 % (ref 0–0.5)
KETONES UR QL STRIP: NEGATIVE
LEUKOCYTE ESTERASE UR QL STRIP: NEGATIVE
LIPASE SERPL-CCNC: 7 U/L (ref 4–60)
LYMPHOCYTES # BLD AUTO: 1.74 K/UL (ref 1–4.8)
MCH RBC QN AUTO: 29.7 PG (ref 27–50)
MCHC RBC AUTO-ENTMCNC: 32.3 G/DL (ref 32–36)
MCV RBC AUTO: 92 FL (ref 82–98)
NITRITE UR QL STRIP: NEGATIVE
NUCLEATED RBC (/100WBC) (OHS): 0 /100 WBC
PH UR STRIP: 8 [PH]
PLATELET # BLD AUTO: 288 K/UL (ref 150–450)
PMV BLD AUTO: 10.1 FL (ref 9.2–12.9)
POTASSIUM SERPL-SCNC: 4.4 MMOL/L (ref 3.5–5.1)
PROT SERPL-MCNC: 9 GM/DL (ref 6–8.4)
PROT UR QL STRIP: NEGATIVE
RBC # BLD AUTO: 3.9 M/UL (ref 4–5.4)
RELATIVE EOSINOPHIL (OHS): 0.4 %
RELATIVE LYMPHOCYTE (OHS): 17.5 % (ref 18–48)
RELATIVE MONOCYTE (OHS): 6.2 % (ref 4–15)
RELATIVE NEUTROPHIL (OHS): 74.7 % (ref 38–73)
SODIUM SERPL-SCNC: 137 MMOL/L (ref 136–145)
SP GR UR STRIP: 1.01
TROPONIN I SERPL DL<=0.01 NG/ML-MCNC: <0.006 NG/ML
UROBILINOGEN UR STRIP-ACNC: NEGATIVE EU/DL
WBC # BLD AUTO: 9.93 K/UL (ref 3.9–12.7)

## 2025-03-27 PROCEDURE — 25000003 PHARM REV CODE 250: Performed by: INTERNAL MEDICINE

## 2025-03-27 PROCEDURE — G0378 HOSPITAL OBSERVATION PER HR: HCPCS

## 2025-03-27 PROCEDURE — 93005 ELECTROCARDIOGRAM TRACING: CPT

## 2025-03-27 PROCEDURE — 83690 ASSAY OF LIPASE: CPT | Performed by: EMERGENCY MEDICINE

## 2025-03-27 PROCEDURE — 25500020 PHARM REV CODE 255: Performed by: EMERGENCY MEDICINE

## 2025-03-27 PROCEDURE — 63600175 PHARM REV CODE 636 W HCPCS: Mod: JZ,TB | Performed by: INTERNAL MEDICINE

## 2025-03-27 PROCEDURE — 81003 URINALYSIS AUTO W/O SCOPE: CPT | Performed by: EMERGENCY MEDICINE

## 2025-03-27 PROCEDURE — 84484 ASSAY OF TROPONIN QUANT: CPT | Performed by: EMERGENCY MEDICINE

## 2025-03-27 PROCEDURE — 93010 ELECTROCARDIOGRAM REPORT: CPT | Mod: ,,, | Performed by: INTERNAL MEDICINE

## 2025-03-27 PROCEDURE — 96376 TX/PRO/DX INJ SAME DRUG ADON: CPT | Mod: 59

## 2025-03-27 PROCEDURE — 99285 EMERGENCY DEPT VISIT HI MDM: CPT | Mod: 25

## 2025-03-27 PROCEDURE — 96375 TX/PRO/DX INJ NEW DRUG ADDON: CPT

## 2025-03-27 PROCEDURE — 83880 ASSAY OF NATRIURETIC PEPTIDE: CPT | Performed by: EMERGENCY MEDICINE

## 2025-03-27 PROCEDURE — 80053 COMPREHEN METABOLIC PANEL: CPT | Performed by: EMERGENCY MEDICINE

## 2025-03-27 PROCEDURE — 85025 COMPLETE CBC W/AUTO DIFF WBC: CPT | Performed by: EMERGENCY MEDICINE

## 2025-03-27 PROCEDURE — 63600175 PHARM REV CODE 636 W HCPCS: Mod: JZ,TB | Performed by: EMERGENCY MEDICINE

## 2025-03-27 PROCEDURE — 96374 THER/PROPH/DIAG INJ IV PUSH: CPT

## 2025-03-27 RX ORDER — SODIUM,POTASSIUM PHOSPHATES 280-250MG
2 POWDER IN PACKET (EA) ORAL
Status: DISCONTINUED | OUTPATIENT
Start: 2025-03-27 | End: 2025-03-28 | Stop reason: HOSPADM

## 2025-03-27 RX ORDER — LANOLIN ALCOHOL/MO/W.PET/CERES
800 CREAM (GRAM) TOPICAL
Status: DISCONTINUED | OUTPATIENT
Start: 2025-03-27 | End: 2025-03-28 | Stop reason: HOSPADM

## 2025-03-27 RX ORDER — IBUPROFEN 200 MG
24 TABLET ORAL
Status: DISCONTINUED | OUTPATIENT
Start: 2025-03-27 | End: 2025-03-28 | Stop reason: HOSPADM

## 2025-03-27 RX ORDER — FAMOTIDINE 20 MG/1
20 TABLET, FILM COATED ORAL 2 TIMES DAILY
Status: DISCONTINUED | OUTPATIENT
Start: 2025-03-27 | End: 2025-03-28 | Stop reason: HOSPADM

## 2025-03-27 RX ORDER — ALUMINUM HYDROXIDE, MAGNESIUM HYDROXIDE, AND SIMETHICONE 1200; 120; 1200 MG/30ML; MG/30ML; MG/30ML
30 SUSPENSION ORAL 4 TIMES DAILY PRN
Status: DISCONTINUED | OUTPATIENT
Start: 2025-03-27 | End: 2025-03-28 | Stop reason: HOSPADM

## 2025-03-27 RX ORDER — KETOROLAC TROMETHAMINE 30 MG/ML
15 INJECTION, SOLUTION INTRAMUSCULAR; INTRAVENOUS EVERY 6 HOURS
Status: DISCONTINUED | OUTPATIENT
Start: 2025-03-28 | End: 2025-03-28 | Stop reason: HOSPADM

## 2025-03-27 RX ORDER — HYDROCHLOROTHIAZIDE 25 MG/1
25 TABLET ORAL DAILY
Status: DISCONTINUED | OUTPATIENT
Start: 2025-03-28 | End: 2025-03-28 | Stop reason: HOSPADM

## 2025-03-27 RX ORDER — NALOXONE HCL 0.4 MG/ML
0.02 VIAL (ML) INJECTION
Status: DISCONTINUED | OUTPATIENT
Start: 2025-03-27 | End: 2025-03-28 | Stop reason: HOSPADM

## 2025-03-27 RX ORDER — PROCHLORPERAZINE EDISYLATE 5 MG/ML
5 INJECTION INTRAMUSCULAR; INTRAVENOUS EVERY 6 HOURS PRN
Status: DISCONTINUED | OUTPATIENT
Start: 2025-03-27 | End: 2025-03-28 | Stop reason: HOSPADM

## 2025-03-27 RX ORDER — BENZONATATE 100 MG/1
200 CAPSULE ORAL 3 TIMES DAILY PRN
Status: DISCONTINUED | OUTPATIENT
Start: 2025-03-27 | End: 2025-03-28 | Stop reason: HOSPADM

## 2025-03-27 RX ORDER — TALC
6 POWDER (GRAM) TOPICAL NIGHTLY PRN
Status: DISCONTINUED | OUTPATIENT
Start: 2025-03-27 | End: 2025-03-28 | Stop reason: HOSPADM

## 2025-03-27 RX ORDER — GLUCAGON 1 MG
1 KIT INJECTION
Status: DISCONTINUED | OUTPATIENT
Start: 2025-03-27 | End: 2025-03-28 | Stop reason: HOSPADM

## 2025-03-27 RX ORDER — MORPHINE SULFATE 4 MG/ML
4 INJECTION, SOLUTION INTRAMUSCULAR; INTRAVENOUS
Refills: 0 | Status: COMPLETED | OUTPATIENT
Start: 2025-03-27 | End: 2025-03-27

## 2025-03-27 RX ORDER — ONDANSETRON HYDROCHLORIDE 2 MG/ML
4 INJECTION, SOLUTION INTRAVENOUS EVERY 6 HOURS PRN
Status: DISCONTINUED | OUTPATIENT
Start: 2025-03-27 | End: 2025-03-28 | Stop reason: HOSPADM

## 2025-03-27 RX ORDER — HYDRALAZINE HYDROCHLORIDE 20 MG/ML
10 INJECTION INTRAMUSCULAR; INTRAVENOUS EVERY 4 HOURS PRN
Status: DISCONTINUED | OUTPATIENT
Start: 2025-03-27 | End: 2025-03-28 | Stop reason: HOSPADM

## 2025-03-27 RX ORDER — KETOROLAC TROMETHAMINE 30 MG/ML
30 INJECTION, SOLUTION INTRAMUSCULAR; INTRAVENOUS ONCE
Status: COMPLETED | OUTPATIENT
Start: 2025-03-27 | End: 2025-03-27

## 2025-03-27 RX ORDER — AMLODIPINE BESYLATE 5 MG/1
10 TABLET ORAL NIGHTLY
Status: DISCONTINUED | OUTPATIENT
Start: 2025-03-27 | End: 2025-03-28 | Stop reason: HOSPADM

## 2025-03-27 RX ORDER — AMOXICILLIN 250 MG
1 CAPSULE ORAL 2 TIMES DAILY PRN
Status: DISCONTINUED | OUTPATIENT
Start: 2025-03-27 | End: 2025-03-28 | Stop reason: HOSPADM

## 2025-03-27 RX ORDER — IBUPROFEN 200 MG
16 TABLET ORAL
Status: DISCONTINUED | OUTPATIENT
Start: 2025-03-27 | End: 2025-03-28 | Stop reason: HOSPADM

## 2025-03-27 RX ORDER — ONDANSETRON HYDROCHLORIDE 2 MG/ML
8 INJECTION, SOLUTION INTRAVENOUS
Status: COMPLETED | OUTPATIENT
Start: 2025-03-27 | End: 2025-03-27

## 2025-03-27 RX ORDER — ACETAMINOPHEN 500 MG
1000 TABLET ORAL 4 TIMES DAILY
Status: DISCONTINUED | OUTPATIENT
Start: 2025-03-27 | End: 2025-03-28 | Stop reason: HOSPADM

## 2025-03-27 RX ORDER — HYDROMORPHONE HYDROCHLORIDE 1 MG/ML
0.5 INJECTION, SOLUTION INTRAMUSCULAR; INTRAVENOUS; SUBCUTANEOUS EVERY 4 HOURS PRN
Status: DISCONTINUED | OUTPATIENT
Start: 2025-03-27 | End: 2025-03-28 | Stop reason: HOSPADM

## 2025-03-27 RX ORDER — KETOROLAC TROMETHAMINE 30 MG/ML
15 INJECTION, SOLUTION INTRAMUSCULAR; INTRAVENOUS
Status: COMPLETED | OUTPATIENT
Start: 2025-03-27 | End: 2025-03-27

## 2025-03-27 RX ADMIN — AMLODIPINE BESYLATE 10 MG: 5 TABLET ORAL at 06:03

## 2025-03-27 RX ADMIN — ACETAMINOPHEN 1000 MG: 500 TABLET ORAL at 11:03

## 2025-03-27 RX ADMIN — IOHEXOL 100 ML: 350 INJECTION, SOLUTION INTRAVENOUS at 01:03

## 2025-03-27 RX ADMIN — MORPHINE SULFATE 4 MG: 4 INJECTION INTRAVENOUS at 12:03

## 2025-03-27 RX ADMIN — KETOROLAC TROMETHAMINE 30 MG: 30 INJECTION, SOLUTION INTRAMUSCULAR; INTRAVENOUS at 06:03

## 2025-03-27 RX ADMIN — KETOROLAC TROMETHAMINE 15 MG: 30 INJECTION, SOLUTION INTRAMUSCULAR; INTRAVENOUS at 11:03

## 2025-03-27 RX ADMIN — ACETAMINOPHEN 1000 MG: 500 TABLET ORAL at 06:03

## 2025-03-27 RX ADMIN — MORPHINE SULFATE 4 MG: 4 INJECTION INTRAVENOUS at 05:03

## 2025-03-27 RX ADMIN — FAMOTIDINE 20 MG: 20 TABLET, FILM COATED ORAL at 06:03

## 2025-03-27 RX ADMIN — ONDANSETRON 8 MG: 2 INJECTION INTRAMUSCULAR; INTRAVENOUS at 11:03

## 2025-03-27 NOTE — SUBJECTIVE & OBJECTIVE
Past Medical History:   Diagnosis Date    Acromioclavicular joint arthritis 8/15/2023    Cataract     Glaucoma     Hypertension     Uveitis        Past Surgical History:   Procedure Laterality Date    BREAST BIOPSY Bilateral     ex bx/ age 16 and 22    BREAST BIOPSY Right 10/2021    core bx    BREAST SURGERY  Biopsy last 10/29/20    BUNIONECTOMY Right      SECTION      HERNIA REPAIR  1975    At age 14    HYSTERECTOMY  2000    JOINT REPLACEMENT  R-. L-    Knee replacements    PARTIAL HYSTERECTOMY          REPAIR, HERNIA, INGUINAL Right         ROTATOR CUFF REPAIR      SMALL INTESTINE SURGERY      TOTAL REPLACEMENT OF BOTH KNEES USING COMPUTER-ASSISTED NAVIGATION      R in  and L in     TUBAL LIGATION  1991       Review of patient's allergies indicates:   Allergen Reactions    Bactrim [sulfamethoxazole-trimethoprim] Hives, Swelling and Rash    Sulfa (sulfonamide antibiotics) Itching    Shellfish containing products Other (See Comments)     Mild itching    Iodine      Causes burning sensation on skin    Losartan Rash and Blisters     Causes lips to blister with a rash       No current facility-administered medications on file prior to encounter.     Current Outpatient Medications on File Prior to Encounter   Medication Sig    amLODIPine (NORVASC) 10 MG tablet Take 1 tablet (10 mg total) by mouth once daily.    amoxicillin (AMOXIL) 500 MG Tab Take 1 tablet (500 mg total) by mouth every 12 (twelve) hours. for 10 days    benzonatate (TESSALON) 100 MG capsule Take 2 capsules (200 mg total) by mouth 3 (three) times daily as needed for Cough.    hydroCHLOROthiazide (HYDRODIURIL) 25 MG tablet Take 1 tablet (25 mg total) by mouth once daily.    benzocaine-menthoL 15-3.6 mg Lozg 1 lozenge by Mucous Membrane route every 4 (four) hours as needed (sore throat).    [] promethazine-dextromethorphan (PROMETHAZINE-DM) 6.25-15 mg/5 mL Syrp Take 5 mLs by mouth every 6 (six) hours as needed  (cough).    [DISCONTINUED] desloratadine (CLARINEX) 5 mg tablet Take 1 tablet (5 mg total) by mouth once daily.    [DISCONTINUED] fluticasone propionate (FLONASE) 50 mcg/actuation nasal spray 1 spray (50 mcg total) by Each Nostril route once daily.    [DISCONTINUED] latanoprost 0.005 % ophthalmic solution Place 1 drop into both eyes every evening.    [DISCONTINUED] methocarbamoL (ROBAXIN) 500 MG Tab Take 1 tablet (500 mg total) by mouth 3 (three) times daily. for 10 days    [DISCONTINUED] naproxen (NAPROSYN) 500 MG tablet Take 1 tablet (500 mg total) by mouth 2 (two) times daily with meals.    [DISCONTINUED] naproxen (NAPROSYN) 500 MG tablet Take 1 tablet (500 mg total) by mouth 2 (two) times daily. for 10 days     Family History       Problem Relation (Age of Onset)    Cataracts Father    Heart disease Father    Pacemaker/defibrilator Mother    Prostate cancer Father          Tobacco Use    Smoking status: Never     Passive exposure: Never    Smokeless tobacco: Never    Tobacco comments:     db   Substance and Sexual Activity    Alcohol use: Never     Comment: occasionally    Drug use: Never    Sexual activity: Not Currently     Partners: Female     Comment: Not active     Review of Systems   Constitutional:  Negative for fever.   Respiratory:  Negative for cough, choking, chest tightness, shortness of breath and wheezing.    Cardiovascular:  Negative for chest pain, palpitations and leg swelling.   Gastrointestinal:  Positive for abdominal pain and nausea. Negative for vomiting.   Genitourinary:  Negative for dysuria.   Musculoskeletal:  Positive for back pain. Negative for neck pain and neck stiffness.   Neurological:  Negative for tremors, speech difficulty, weakness, light-headedness and headaches.     Objective:     Vital Signs (Most Recent):  Temp: 98.7 °F (37.1 °C) (03/27/25 1700)  Pulse: 98 (03/27/25 1700)  Resp: 18 (03/27/25 1706)  BP: (!) 198/93 (03/27/25 1700)  SpO2: 98 % (03/27/25 1700) Vital Signs (24h  "Range):  Temp:  [98 °F (36.7 °C)-98.7 °F (37.1 °C)] 98.7 °F (37.1 °C)  Pulse:  [68-98] 98  Resp:  [18-20] 18  SpO2:  [95 %-100 %] 98 %  BP: (198-202)/(93-96) 198/93     Weight: 115.2 kg (254 lb)  Body mass index is 41 kg/m².     Physical Exam         NAD, A/O 3   RRR   CTAB   Soft, mild tenderness of the right lower quadrant without rebound or guarding, bowel sounds present  No edema   Right paraspinal muscle tenderness present.  No spinal tenderness.  No dermatomal rash observed.    Symmetric strength in major muscle groups of the lower extremities.  No alteration in sensation of lower extremities to light touch.    Significant Labs: All pertinent labs within the past 24 hours have been reviewed.  CBC:   Recent Labs   Lab 03/27/25  1129   WBC 9.93   HGB 11.6*   HCT 35.9*        CMP:   Recent Labs   Lab 03/27/25  1129      K 4.4      CO2 25   BUN 8   CREATININE 0.6   CALCIUM 9.4   ALBUMIN 3.6   BILITOT 0.4   ALKPHOS 117   AST 18   ALT 32   ANIONGAP 10     Magnesium: No results for input(s): "MG" in the last 48 hours.    Significant Imaging: I have reviewed all pertinent imaging results/findings within the past 24 hours.  "

## 2025-03-27 NOTE — ED PROVIDER NOTES
Encounter Date: 3/27/2025    SCRIBE #1 NOTE: Jonelle ROCHE, waldemar scribing for, and in the presence of,  Ni Wright DO.       History     Chief Complaint   Patient presents with    Back Pain     Pt reports right sided flank and side pain x3 days.  Pt has been taking naproxen and tylenol for pain relief without results. Pt denies any urinary symptoms     Octavia Arrington is a 65 y.o. female with Hx of HTN, who presents to the ED for chief complaint of right flank pain that began 3 days ago. Patient states she noticed mild pain on Monday and took a hot bath with improvement. Patient states the pain returned yesterday, so she took a naproxen with relief. Patient reports this morning upon waking up the pain became severe and is now radiating around to to her right lower abdomen. She reports taking naproxen and tylenol at 5 am today without relief. Patient denies dysuria, hematuria, urinary frequency or urgency. Patient has allergy to bactrim, losartan, sulfa, and iodine.     The history is provided by the patient. No  was used.     Review of patient's allergies indicates:   Allergen Reactions    Bactrim [sulfamethoxazole-trimethoprim] Hives, Swelling and Rash    Sulfa (sulfonamide antibiotics) Itching    Shellfish containing products Other (See Comments)     Mild itching    Iodine      Causes burning sensation on skin    Losartan Rash and Blisters     Causes lips to blister with a rash     Past Medical History:   Diagnosis Date    Acromioclavicular joint arthritis 8/15/2023    Cataract     Glaucoma     Hypertension     Uveitis      Past Surgical History:   Procedure Laterality Date    BREAST BIOPSY Bilateral     ex bx/ age 16 and 22    BREAST BIOPSY Right 10/2021    core bx    BREAST SURGERY  Biopsy last 10/29/20    BUNIONECTOMY Right      SECTION      HERNIA REPAIR      At age 14    HYSTERECTOMY  2000    JOINT REPLACEMENT  R-. L-    Knee replacements    PARTIAL  HYSTERECTOMY      2000    REPAIR, HERNIA, INGUINAL Right     1975    ROTATOR CUFF REPAIR      SMALL INTESTINE SURGERY      TOTAL REPLACEMENT OF BOTH KNEES USING COMPUTER-ASSISTED NAVIGATION      R in 2011 and L in 2013    TUBAL LIGATION  2/1991     Family History   Problem Relation Name Age of Onset    Pacemaker/defibrilator Mother      Cataracts Father      Prostate cancer Father      Heart disease Father      Amblyopia Neg Hx      Blindness Neg Hx      Glaucoma Neg Hx      Macular degeneration Neg Hx      Retinal detachment Neg Hx      Strabismus Neg Hx      Diabetes Neg Hx       Social History[1]  Review of Systems   Constitutional:  Negative for fever.   HENT:  Negative for rhinorrhea and sore throat.    Eyes:  Negative for redness.   Respiratory:  Negative for shortness of breath.    Cardiovascular:  Negative for chest pain and leg swelling.   Gastrointestinal:  Positive for abdominal pain (RLQ). Negative for diarrhea, nausea and vomiting.   Genitourinary:  Positive for flank pain (right, radiates to RLQ). Negative for dysuria, frequency, hematuria and urgency.   Musculoskeletal:  Negative for back pain.   Skin:  Negative for rash.   Neurological:  Negative for syncope and headaches.   All other systems reviewed and are negative.      Physical Exam     Initial Vitals [03/27/25 0915]   BP Pulse Resp Temp SpO2   (!) 202/96 84 20 98 °F (36.7 °C) 100 %      MAP       --         Physical Exam    Nursing note and vitals reviewed.  Constitutional: She appears well-developed and well-nourished.   Patient gave consent to have physical exam performed.     HENT:   Head: Normocephalic and atraumatic.   Right Ear: External ear normal.   Left Ear: External ear normal.   Eyes: Conjunctivae and EOM are normal. Pupils are equal, round, and reactive to light. Right eye exhibits no discharge. Left eye exhibits no discharge.   Neck: Neck supple.   Normal range of motion.  Cardiovascular:  Normal rate, regular rhythm, normal heart  sounds and intact distal pulses.     Exam reveals no gallop and no friction rub.       No murmur heard.  Pulmonary/Chest: Effort normal and breath sounds normal. No respiratory distress. She has no wheezes. She has no rhonchi. She has no rales. She exhibits no tenderness.   Abdominal: Abdomen is soft. Bowel sounds are normal. She exhibits no distension and no mass. There is abdominal tenderness in the right lower quadrant.   There is right CVA tenderness.There is no rebound and no guarding.   Musculoskeletal:         General: No tenderness or edema. Normal range of motion.      Cervical back: Normal range of motion and neck supple.     Neurological: She is alert and oriented to person, place, and time.   Skin: Skin is warm and dry.   Psychiatric: She has a normal mood and affect.         ED Course   Procedures  Labs Reviewed   URINALYSIS, REFLEX TO URINE CULTURE - Abnormal       Result Value    Color, UA Colorless (*)     Appearance, UA Clear      pH, UA 8.0      Spec Grav UA 1.010      Protein, UA Negative      Glucose, UA Negative      Ketones, UA Negative      Bilirubin, UA Negative      Blood, UA Negative      Nitrites, UA Negative      Urobilinogen, UA Negative      Leukocyte Esterase, UA Negative     COMPREHENSIVE METABOLIC PANEL - Abnormal    Sodium 137      Potassium 4.4      Chloride 102      CO2 25      Glucose 101      BUN 8      Creatinine 0.6      Calcium 9.4      Protein Total 9.0 (*)     Albumin 3.6      Bilirubin Total 0.4            AST 18      ALT 32      Anion Gap 10      eGFR >60     CBC WITH DIFFERENTIAL - Abnormal    WBC 9.93      RBC 3.90 (*)     HGB 11.6 (*)     HCT 35.9 (*)     MCV 92      MCH 29.7      MCHC 32.3      RDW 14.2      Platelet Count 288      MPV 10.1      Nucleated RBC 0      Neut % 74.7 (*)     Lymph % 17.5 (*)     Mono % 6.2      Eos % 0.4      Basophil % 0.2      Imm Grans % 1.0 (*)     Neut # 7.41      Lymph # 1.74      Mono # 0.62      Eos # 0.04      Baso # 0.02       Imm Grans # 0.10 (*)    LIPASE - Normal    Lipase Level 7     TROPONIN I - Normal    Troponin-I <0.006     CBC W/ AUTO DIFFERENTIAL    Narrative:     The following orders were created for panel order CBC W/ AUTO DIFFERENTIAL.  Procedure                               Abnormality         Status                     ---------                               -----------         ------                     CBC with Differential[[id number]]       Abnormal            Final result                 Please view results for these tests on the individual orders.   B-TYPE NATRIURETIC PEPTIDE     EKG Readings: (Independently Interpreted)   No STEMI. Rate of 75 bpm. Normal Sinus Rhythm. Normal Axis. Abnormal EKG. QTc normal at 419. When compared to prior EKG dated 3/11/25 rate decreased by 11 bpm.          Imaging Results              US Retroperitoneal Complete (Final result)  Result time 03/27/25 15:00:48   Procedure changed from US Retroperitoneal Limited     Final result by Shade Leslie MD (03/27/25 15:00:48)                   Impression:      1.2 cm hypoechoic focus in the inferior pole of the left kidney favored to represent a complicated cyst when compared with CT from 03/27/2025.  Follow-up ultrasound is recommended in 6 months to ensure stability.    Simple cyst in the right kidney.      Electronically signed by: Shade Leslie MD  Date:    03/27/2025  Time:    15:00               Narrative:    EXAMINATION:  US RETROPERITONEAL COMPLETE    CLINICAL HISTORY:  abd pain, ct follow up; Unspecified abdominal pain    TECHNIQUE:  Ultrasound of the kidneys and urinary bladder was performed including color flow and Doppler evaluation of the kidneys.    COMPARISON:  CT from 03/27/2025    FINDINGS:  Right kidney: The right kidney measures 12.1 cm. No cortical thinning. No loss of corticomedullary distinction. Resistive index measures 0.70.  Simple cyst measuring 3.4 x 3.0 x 3.2 cm.  No mass. No renal stone. No  hydronephrosis.    Left kidney: The left kidney measures 12.1 cm. No cortical thinning. No loss of corticomedullary distinction. Resistive index measures 0.60.  Hypoechoic focus in the inferior pole of the left kidney measuring 1.2 x 1.1 x 1.2 cm favored to represent a complicated cyst.  No mass. No renal stone. No hydronephrosis.    The bladder is partially distended at the time of scanning and has an unremarkable appearance.                                       CT Chest Abdomen Pelvis With IV Contrast (XPD) Routine Oral Contrast (Final result)  Result time 03/27/25 14:12:09      Final result by Shade Leslie MD (03/27/25 14:12:09)                   Impression:      Small right pleural effusion.    Small hypodensity in the inferior pole of the left kidney.  When compared to noncontrast examination on same date, no definite enhancement is visualized.  Findings likely represent a mildly complicated cyst.  Follow-up ultrasound is recommended in 6 months to ensure stability.      Electronically signed by: Shade Leslie MD  Date:    03/27/2025  Time:    14:12               Narrative:    EXAMINATION:  CT CHEST ABDOMEN PELVIS WITH IV CONTRAST (XPD)    CLINICAL HISTORY:  Abdominal abscess/infection suspected;Abnormal CT;    TECHNIQUE:  Axial images of the chest, abdomen, and pelvis were acquired  after the use of 100 cc Khvl030 IV contrast.  Coronal and sagittal reconstructions were also obtained    COMPARISON:  CT abdomen pelvis from 03/27/2025, CTA chest from 07/11/2024    FINDINGS:  Thoracic soft tissues: Normal thyroid.    Aorta: Thoracic aorta is normal in caliber and contour with moderate calcific atherosclerosis.    Heart: Normal in size. No pericardial effusion. No significant calcific coronary atherosclerosis.    Leena/Mediastinum: No significant mediastinal, hilar, or axillary lymphadenopathy    Lungs: Trachea and bronchi are patent.  Stable scarring of the apices of the lungs bilaterally.  Lungs are  well aerated, without consolidation.  Small right pleural effusion.    Liver: Liver is at upper limits of normal in size.  No focal hepatic lesion.    Gallbladder: No calcified gallstones.    Bile Ducts: No evidence of dilated ducts.    Pancreas: No mass or peripancreatic fat stranding.    Spleen: Unremarkable.    Stomach and duodenum: Unremarkable.    Adrenals: Unremarkable.    Kidneys/ Ureters: Normal in size and location. Normal enhancement. No hydronephrosis or nephrolithiasis.  Right kidney demonstrates a simple cyst.  Left kidney demonstrates a 1.2 x 1.2 cm exophytic hypodensity in the inferior pole of the left kidney demonstrating attenuation slightly higher than simple cyst, not significantly changed compared to prior CT from 03/27/2025 indicating likely a mildly complicated cyst.  No ureteral dilatation.    Bladder: No evidence of wall thickening.    Reproductive organs: Uterus is surgically absent.    Bowel/Mesentery: Small bowel is normal in caliber with no evidence of obstruction.  No evidence of inflammation or wall thickening.  Postsurgical changes at the level of the cecum.  Colon demonstrates no focal wall thickening.    Lymph nodes: No lymphadenapathy.    Abdominal wall:  Small fat containing umbilical hernia.    Vasculature: No aneurysm. Mild calcific atherosclerosis.    Bones: No acute fracture. No suspicious osseous lesions.                                        CT Abdomen Pelvis  Without Contrast (Final result)  Result time 03/27/25 12:25:45      Final result by Brad Marino MD (03/27/25 12:25:45)                   Impression:      No renal or ureteral calculi are appreciated.  There is mild prominence of the right ureter throughout its course although no evidence for right-sided hydronephrosis.    Subtle exophytic hypodense nodule originating from the lower pole of the left kidney.  While this could represent a cyst, this is too small to adequately characterize and a solid mass cannot be  excluded.  Further evaluation with renal ultrasound examination is recommended.    Right renal cysts.    Small layering right pleural effusion.    This report was flagged in Epic as abnormal.      Electronically signed by: Brad Marino MD  Date:    03/27/2025  Time:    12:25               Narrative:    EXAMINATION:  CT ABDOMEN PELVIS WITHOUT CONTRAST    CLINICAL HISTORY:  Flank pain, kidney stone suspected;    TECHNIQUE:  Low dose axial images, sagittal and coronal reformations were obtained from the lung bases to the pubic symphysis.  Oral contrast was not administered.    COMPARISON:  None    FINDINGS:  There are dependent atelectatic changes within the lung bases.  There is a small layering right pleural effusion.  The base of the heart appears unremarkable.  Bony structures appear intact.  Bony structures appear intact.  There is no evidence for acute fracture or bone destruction.    The liver is normal in overall size with no focal liver lesions identified on this noncontrast examination.  The gallbladder is present with no calcified gallstones identified.  No biliary dilatation is appreciated on this noncontrast study.  The spleen, stomach, and pancreas all appear grossly unremarkable.  The adrenal glands are not enlarged.  The kidneys are normal in overall size.  There is a 3.4 cm right lower pole renal cyst.  There is a left lower pole renal hypodensity measuring approximately 1.3 cm in size which is too small to adequately characterize.  Further evaluation with renal ultrasound examination is recommended as a solid mass cannot be excluded.  There is no evidence for hydronephrosis.  There is mild prominence of the right ureter throughout its course, however no ureteral calculi are appreciated.  Findings could be secondary to a recently passed right ureteral calculus although no bladder calculi are appreciated.  There are surgical changes near the cecum.  The appendix is not visualized.  There are no dilated  loops of bowel evident.  The uterus is absent.  No abnormal adnexal masses are appreciated.  The urinary bladder is smooth walled and appears unremarkable.  There is no evidence for pelvic or inguinal lymphadenopathy.  There is a small fat containing umbilical hernia.                                       Medications   morphine injection 4 mg (has no administration in time range)   ketorolac injection 15 mg (15 mg Intravenous Given 3/27/25 1132)   ondansetron injection 8 mg (8 mg Intravenous Given 3/27/25 1132)   morphine injection 4 mg (4 mg Intravenous Given 3/27/25 1235)   iohexoL (OMNIPAQUE 350) injection 100 mL (100 mLs Intravenous Given 3/27/25 1334)     Medical Decision Making  Amount and/or Complexity of Data Reviewed  Labs: ordered. Decision-making details documented in ED Course.  Radiology: ordered. Decision-making details documented in ED Course.  ECG/medicine tests: ordered and independent interpretation performed. Decision-making details documented in ED Course.    Risk  Prescription drug management.    Medical Decision Making:    This is an evaluation of a 65 y.o. female that presents to the Emergency Department for   Chief Complaint   Patient presents with    Back Pain     Pt reports right sided flank and side pain x3 days.  Pt has been taking naproxen and tylenol for pain relief without results. Pt denies any urinary symptoms     The patient is a non-toxic and well appearing patient. On physical exam, patient appears well hydrated with moist mucus membranes. Breath sounds are clear and equal bilaterally with no adventitious breath sounds, tachypnea or respiratory distress. Regular rate and rhythm. No murmurs. Abdomen soft. Patient is tolerating PO without difficulty. Physical exam otherwise as above.     I have reviewed vital signs and nursing notes.   Vital Signs Are Reassuring.     Based on the patient's symptoms, I am considering and evaluating for the following differential diagnoses: renal  stone, hydronephrosis, UTI, TANNER, appendicitis.     Consider hospitalization for:  Severe pain  Patient is agreeable to admission to [facility]  ED Course:Treatment in the ED included Physical Exam and medications given in ED  Medications   morphine injection 4 mg (has no administration in time range)   ketorolac injection 15 mg (15 mg Intravenous Given 3/27/25 1132)   ondansetron injection 8 mg (8 mg Intravenous Given 3/27/25 1132)   morphine injection 4 mg (4 mg Intravenous Given 3/27/25 1235)   iohexoL (OMNIPAQUE 350) injection 100 mL (100 mLs Intravenous Given 3/27/25 1334)     Patient reports feeling better after treatment in the ER.    External Data/Documents Reviewed: Previous medical records and vital signs reviewed, see HPI and Physical exam.   Labs: ordered and reviewed.  UA negative for blood.  Radiology: ordered as indicated and reviewed.  CT report concerning for pleural effusion  ECG/medicine tests: ordered and independent interpretation performed by Dr. Ni Wright DO. Decision-making details documented in ED Course.   Cardiac monitor placed for hypertension. Monitor shows Normal Sinus Rhythm with  rate of 70. Interpreted by Dr. Ni Wright DO.    Risk  Diagnosis or treatment significantly limited by the following social determinants of health: Body mass index is 41 kg/m².     In shared decision making with the patient, we discussed treatment, prescriptions, labs, and imaging results.        I contacted   requesting consult with hospitalist for admission. Requesting consultation with Internal Medicine.   Discussed patient's presentation, past medical history, physical exam, labs, radiology results, vital signs, and ED course.      Patient accepted by PAV[facility] Arana on call for Dr Nguyen for transfer and admission at time 4:30 pm   At this time patient will be transferred & admitted.  Patient will be transferred via EMS to accepting facility.      At time of admission patient is awake  alert oriented x4 speaking clearly in full sentences and moving all 4 extremities.     The following labs and imaging were reviewed:    Admission on 03/27/2025   Component Date Value Ref Range Status    Color, UA 03/27/2025 Colorless (A)  Straw, Laurence, Yellow, Light-Orange Final    Appearance, UA 03/27/2025 Clear  Clear Final    pH, UA 03/27/2025 8.0  5.0 - 8.0 Final    Spec Grav UA 03/27/2025 1.010  1.005 - 1.030 Final    Protein, UA 03/27/2025 Negative  Negative Final    Recommend a 24 hour urine protein or a urine protein/creatinine ratio if globulin induced proteinuria is clinically suspected.    Glucose, UA 03/27/2025 Negative  Negative Final    Ketones, UA 03/27/2025 Negative  Negative Final    Bilirubin, UA 03/27/2025 Negative  Negative Final    Blood, UA 03/27/2025 Negative  Negative Final    Nitrites, UA 03/27/2025 Negative  Negative Final    Urobilinogen, UA 03/27/2025 Negative  <2.0 EU/dL Final    Leukocyte Esterase, UA 03/27/2025 Negative  Negative Final    Sodium 03/27/2025 137  136 - 145 mmol/L Final    Potassium 03/27/2025 4.4  3.5 - 5.1 mmol/L Final    Chloride 03/27/2025 102  95 - 110 mmol/L Final    CO2 03/27/2025 25  23 - 29 mmol/L Final    Glucose 03/27/2025 101  70 - 110 mg/dL Final    BUN 03/27/2025 8  8 - 23 mg/dL Final    Creatinine 03/27/2025 0.6  0.5 - 1.4 mg/dL Final    Calcium 03/27/2025 9.4  8.7 - 10.5 mg/dL Final    Protein Total 03/27/2025 9.0 (H)  6.0 - 8.4 gm/dL Final    Albumin 03/27/2025 3.6  3.5 - 5.2 g/dL Final    Bilirubin Total 03/27/2025 0.4  0.1 - 1.0 mg/dL Final    For infants and newborns, interpretation of results should be based   on gestational age, weight and in agreement with clinical   observations.    Premature Infant recommended reference ranges:   0-24 hours:  <8.0 mg/dL   24-48 hours: <12.0 mg/dL   3-5 days:    <15.0 mg/dL   6-29 days:   <15.0 mg/dL    ALP 03/27/2025 117  40 - 150 unit/L Final    AST 03/27/2025 18  11 - 45 unit/L Final    ALT 03/27/2025 32  10 -  44 unit/L Final    Anion Gap 03/27/2025 10  8 - 16 mmol/L Final    eGFR 03/27/2025 >60  >60 mL/min/1.73/m2 Final    Estimated GFR calculated using the CKD-EPI creatinine (2021) equation.    Lipase Level 03/27/2025 7  4 - 60 U/L Final    Troponin-I 03/27/2025 <0.006  <=0.026 ng/mL Final    The reference interval for Troponin I represents the 99th percentile cutoff for our facility and is consistent with 3rd generation assay performance.    WBC 03/27/2025 9.93  3.90 - 12.70 K/uL Final    RBC 03/27/2025 3.90 (L)  4.00 - 5.40 M/uL Final    HGB 03/27/2025 11.6 (L)  12.0 - 16.0 gm/dL Final    HCT 03/27/2025 35.9 (L)  37.0 - 48.5 % Final    MCV 03/27/2025 92  82 - 98 fL Final    MCH 03/27/2025 29.7  27.0 - 50.0 pg Final    MCHC 03/27/2025 32.3  32.0 - 36.0 g/dL Final    RDW 03/27/2025 14.2  11.5 - 14.5 % Final    Platelet Count 03/27/2025 288  150 - 450 K/uL Final    MPV 03/27/2025 10.1  9.2 - 12.9 fL Final    Nucleated RBC 03/27/2025 0  <=0 /100 WBC Final    Neut % 03/27/2025 74.7 (H)  38 - 73 % Final    Lymph % 03/27/2025 17.5 (L)  18 - 48 % Final    Mono % 03/27/2025 6.2  4 - 15 % Final    Eos % 03/27/2025 0.4  <=8 % Final    Basophil % 03/27/2025 0.2  <=1.9 % Final    Imm Grans % 03/27/2025 1.0 (H)  0.0 - 0.5 % Final    Neut # 03/27/2025 7.41  1.8 - 7.7 K/uL Final    Lymph # 03/27/2025 1.74  1 - 4.8 K/uL Final    Mono # 03/27/2025 0.62  0.3 - 1 K/uL Final    Eos # 03/27/2025 0.04  <=0.5 K/uL Final    Baso # 03/27/2025 0.02  <=0.2 K/uL Final    Imm Grans # 03/27/2025 0.10 (H)  0.00 - 0.04 K/uL Final    Mild elevation in immature granulocytes is non specific and can be seen in a variety of conditions including stress response, acute inflammation, trauma and pregnancy. Correlation with other laboratory and clinical findings is essential.        Imaging Results              US Retroperitoneal Complete (Final result)  Result time 03/27/25 15:00:48   Procedure changed from US Retroperitoneal Limited     Final result by  Shade Leslie MD (03/27/25 15:00:48)                   Impression:      1.2 cm hypoechoic focus in the inferior pole of the left kidney favored to represent a complicated cyst when compared with CT from 03/27/2025.  Follow-up ultrasound is recommended in 6 months to ensure stability.    Simple cyst in the right kidney.      Electronically signed by: Shade Leslie MD  Date:    03/27/2025  Time:    15:00               Narrative:    EXAMINATION:  US RETROPERITONEAL COMPLETE    CLINICAL HISTORY:  abd pain, ct follow up; Unspecified abdominal pain    TECHNIQUE:  Ultrasound of the kidneys and urinary bladder was performed including color flow and Doppler evaluation of the kidneys.    COMPARISON:  CT from 03/27/2025    FINDINGS:  Right kidney: The right kidney measures 12.1 cm. No cortical thinning. No loss of corticomedullary distinction. Resistive index measures 0.70.  Simple cyst measuring 3.4 x 3.0 x 3.2 cm.  No mass. No renal stone. No hydronephrosis.    Left kidney: The left kidney measures 12.1 cm. No cortical thinning. No loss of corticomedullary distinction. Resistive index measures 0.60.  Hypoechoic focus in the inferior pole of the left kidney measuring 1.2 x 1.1 x 1.2 cm favored to represent a complicated cyst.  No mass. No renal stone. No hydronephrosis.    The bladder is partially distended at the time of scanning and has an unremarkable appearance.                                       CT Chest Abdomen Pelvis With IV Contrast (XPD) Routine Oral Contrast (Final result)  Result time 03/27/25 14:12:09      Final result by Shade Leslie MD (03/27/25 14:12:09)                   Impression:      Small right pleural effusion.    Small hypodensity in the inferior pole of the left kidney.  When compared to noncontrast examination on same date, no definite enhancement is visualized.  Findings likely represent a mildly complicated cyst.  Follow-up ultrasound is recommended in 6 months to ensure  stability.      Electronically signed by: Shade Leslie MD  Date:    03/27/2025  Time:    14:12               Narrative:    EXAMINATION:  CT CHEST ABDOMEN PELVIS WITH IV CONTRAST (XPD)    CLINICAL HISTORY:  Abdominal abscess/infection suspected;Abnormal CT;    TECHNIQUE:  Axial images of the chest, abdomen, and pelvis were acquired  after the use of 100 cc [id number] IV contrast.  Coronal and sagittal reconstructions were also obtained    COMPARISON:  CT abdomen pelvis from 03/27/2025, CTA chest from 07/11/2024    FINDINGS:  Thoracic soft tissues: Normal thyroid.    Aorta: Thoracic aorta is normal in caliber and contour with moderate calcific atherosclerosis.    Heart: Normal in size. No pericardial effusion. No significant calcific coronary atherosclerosis.    Leena/Mediastinum: No significant mediastinal, hilar, or axillary lymphadenopathy    Lungs: Trachea and bronchi are patent.  Stable scarring of the apices of the lungs bilaterally.  Lungs are well aerated, without consolidation.  Small right pleural effusion.    Liver: Liver is at upper limits of normal in size.  No focal hepatic lesion.    Gallbladder: No calcified gallstones.    Bile Ducts: No evidence of dilated ducts.    Pancreas: No mass or peripancreatic fat stranding.    Spleen: Unremarkable.    Stomach and duodenum: Unremarkable.    Adrenals: Unremarkable.    Kidneys/ Ureters: Normal in size and location. Normal enhancement. No hydronephrosis or nephrolithiasis.  Right kidney demonstrates a simple cyst.  Left kidney demonstrates a 1.2 x 1.2 cm exophytic hypodensity in the inferior pole of the left kidney demonstrating attenuation slightly higher than simple cyst, not significantly changed compared to prior CT from 03/27/2025 indicating likely a mildly complicated cyst.  No ureteral dilatation.    Bladder: No evidence of wall thickening.    Reproductive organs: Uterus is surgically absent.    Bowel/Mesentery: Small bowel is normal in caliber with no  evidence of obstruction.  No evidence of inflammation or wall thickening.  Postsurgical changes at the level of the cecum.  Colon demonstrates no focal wall thickening.    Lymph nodes: No lymphadenapathy.    Abdominal wall:  Small fat containing umbilical hernia.    Vasculature: No aneurysm. Mild calcific atherosclerosis.    Bones: No acute fracture. No suspicious osseous lesions.                                        CT Abdomen Pelvis  Without Contrast (Final result)  Result time 03/27/25 12:25:45      Final result by Brad Marino MD (03/27/25 12:25:45)                   Impression:      No renal or ureteral calculi are appreciated.  There is mild prominence of the right ureter throughout its course although no evidence for right-sided hydronephrosis.    Subtle exophytic hypodense nodule originating from the lower pole of the left kidney.  While this could represent a cyst, this is too small to adequately characterize and a solid mass cannot be excluded.  Further evaluation with renal ultrasound examination is recommended.    Right renal cysts.    Small layering right pleural effusion.    This report was flagged in Epic as abnormal.      Electronically signed by: Brad Marino MD  Date:    03/27/2025  Time:    12:25               Narrative:    EXAMINATION:  CT ABDOMEN PELVIS WITHOUT CONTRAST    CLINICAL HISTORY:  Flank pain, kidney stone suspected;    TECHNIQUE:  Low dose axial images, sagittal and coronal reformations were obtained from the lung bases to the pubic symphysis.  Oral contrast was not administered.    COMPARISON:  None    FINDINGS:  There are dependent atelectatic changes within the lung bases.  There is a small layering right pleural effusion.  The base of the heart appears unremarkable.  Bony structures appear intact.  Bony structures appear intact.  There is no evidence for acute fracture or bone destruction.    The liver is normal in overall size with no focal liver lesions identified on  this noncontrast examination.  The gallbladder is present with no calcified gallstones identified.  No biliary dilatation is appreciated on this noncontrast study.  The spleen, stomach, and pancreas all appear grossly unremarkable.  The adrenal glands are not enlarged.  The kidneys are normal in overall size.  There is a 3.4 cm right lower pole renal cyst.  There is a left lower pole renal hypodensity measuring approximately 1.3 cm in size which is too small to adequately characterize.  Further evaluation with renal ultrasound examination is recommended as a solid mass cannot be excluded.  There is no evidence for hydronephrosis.  There is mild prominence of the right ureter throughout its course, however no ureteral calculi are appreciated.  Findings could be secondary to a recently passed right ureteral calculus although no bladder calculi are appreciated.  There are surgical changes near the cecum.  The appendix is not visualized.  There are no dilated loops of bowel evident.  The uterus is absent.  No abnormal adnexal masses are appreciated.  The urinary bladder is smooth walled and appears unremarkable.  There is no evidence for pelvic or inguinal lymphadenopathy.  There is a small fat containing umbilical hernia.                                            Scribe Attestation:   Scribe #1: I performed the above scribed service and the documentation accurately describes the services I performed. I attest to the accuracy of the note.                              I, Dr. Ni Wright, personally performed the services described in this documentation. This document was produced by a scribe under my direction and in my presence. All medical record entries made by the scribe were at my direction and in my presence.  I have reviewed the chart and agree that the record reflects my personal performance and is accurate and complete. Ni Wright DO.     03/27/2025 4:48 PM    Clinical Impression:  Final  diagnoses:  [I10] HTN (hypertension)  [R10.9] Abdominal pain  [J90] Pleural effusion - Small right pleural effusion (Primary)  [N28.1] Renal cyst, right  [R10.9] Acute right flank pain          ED Disposition Condition    Observation Stable                    [1]   Social History  Tobacco Use    Smoking status: Never     Passive exposure: Never    Smokeless tobacco: Never    Tobacco comments:     db   Substance Use Topics    Alcohol use: Never     Comment: occasionally    Drug use: Never        Ni Wright DO  03/27/25 [id number]

## 2025-03-27 NOTE — ASSESSMENT & PLAN NOTE
Paraspinal.  Located in the lower thoracic/upper lumbar region.  No warning signs   -scheduling acetaminophen, ketorolac.  As needed Dilaudid available.    -Pepcid ordered for GI prophylaxis   -MRI lumbar and thoracic spine ordered.  -Lovenox for DVT prophylaxis held in case procedure is required  PT/OT consulted     -of note,  workup was globally unremarkable except imaging did demonstrate a prominence of the right ureter without hydronephrosis.  Consider Urology consultation if pain does not improve

## 2025-03-27 NOTE — Clinical Note
Diagnosis: Pleural effusion [051391]   Future Attending Provider: SEYMOUR BARRIGA [[id number]]   Special Needs:: Fall Risk [15]

## 2025-03-27 NOTE — H&P
[facility] Medicine  History & Physical    Patient Name: Octavia Arrington  MRN: [id number]  Patient Class: OP- Observation  Admission Date: 3/27/2025  Attending Physician: Ni Wright DO   Primary Care Provider: Do Nguyen MD         Patient information was obtained from patient and ER records.     Subjective:     Principal Problem:Acute back pain    Chief Complaint:   Chief Complaint   Patient presents with    Back Pain     Pt reports right sided flank and side pain x3 days.  Pt has been taking naproxen and tylenol for pain relief without results. Pt denies any urinary symptoms        HPI: 65-year-old female with history hypertension presents with 2 days of right paraspinal muscular pain with pain extending into the right lower abdominal region potentially musculoskeletal in origin imaging globally suggest against a  origin (although abnormal prominent of the right ureter was noted without hydronephrosis).    Two days ago the patient work and she says she is frequently lifting things.  She does not remember an exact point were she developed the pain discussion.  By the end of the day though she noted that she was having right lower back pain which was mild-to-moderate severity.  The pain continued and by the morning of admission it was severe leading to her presentation.    It is worse in the region of the right paraspinal muscles.  She reports the pain radiates into her lower right abdomen.    No recent fever.  No change bowel habits was mildly nauseated in the setting of the pain.  No dysuria.    She continues tolerate oral intake.    No new lower extremity weakness or alteration in sensation.  No urinary incontinence or retention.    Past Medical History:   Diagnosis Date    Acromioclavicular joint arthritis 8/15/2023    Cataract     Glaucoma     Hypertension     Uveitis        Past Surgical History:   Procedure Laterality Date    BREAST BIOPSY Bilateral     ex bx/ age 16 and 22     BREAST BIOPSY Right 10/2021    core bx    BREAST SURGERY  Biopsy last 10/29/20    BUNIONECTOMY Right      SECTION      HERNIA REPAIR      At age 14    HYSTERECTOMY  2000    JOINT REPLACEMENT  R-. L-    Knee replacements    PARTIAL HYSTERECTOMY          REPAIR, HERNIA, INGUINAL Right         ROTATOR CUFF REPAIR      SMALL INTESTINE SURGERY      TOTAL REPLACEMENT OF BOTH KNEES USING COMPUTER-ASSISTED NAVIGATION      R in  and L in     TUBAL LIGATION  1991       Review of patient's allergies indicates:   Allergen Reactions    Bactrim [sulfamethoxazole-trimethoprim] Hives, Swelling and Rash    Sulfa (sulfonamide antibiotics) Itching    Shellfish containing products Other (See Comments)     Mild itching    Iodine      Causes burning sensation on skin    Losartan Rash and Blisters     Causes lips to blister with a rash       No current facility-administered medications on file prior to encounter.     Current Outpatient Medications on File Prior to Encounter   Medication Sig    amLODIPine (NORVASC) 10 MG tablet Take 1 tablet (10 mg total) by mouth once daily.    amoxicillin (AMOXIL) 500 MG Tab Take 1 tablet (500 mg total) by mouth every 12 (twelve) hours. for 10 days    benzonatate (TESSALON) 100 MG capsule Take 2 capsules (200 mg total) by mouth 3 (three) times daily as needed for Cough.    hydroCHLOROthiazide (HYDRODIURIL) 25 MG tablet Take 1 tablet (25 mg total) by mouth once daily.    benzocaine-menthoL 15-3.6 mg Lozg 1 lozenge by Mucous Membrane route every 4 (four) hours as needed (sore throat).    [] promethazine-dextromethorphan (PROMETHAZINE-DM) 6.25-15 mg/5 mL Syrp Take 5 mLs by mouth every 6 (six) hours as needed (cough).    [DISCONTINUED] desloratadine (CLARINEX) 5 mg tablet Take 1 tablet (5 mg total) by mouth once daily.    [DISCONTINUED] fluticasone propionate (FLONASE) 50 mcg/actuation nasal spray 1 spray (50 mcg total) by Each Nostril route once daily.     [DISCONTINUED] latanoprost 0.005 % ophthalmic solution Place 1 drop into both eyes every evening.    [DISCONTINUED] methocarbamoL (ROBAXIN) 500 MG Tab Take 1 tablet (500 mg total) by mouth 3 (three) times daily. for 10 days    [DISCONTINUED] naproxen (NAPROSYN) 500 MG tablet Take 1 tablet (500 mg total) by mouth 2 (two) times daily with meals.    [DISCONTINUED] naproxen (NAPROSYN) 500 MG tablet Take 1 tablet (500 mg total) by mouth 2 (two) times daily. for 10 days     Family History       Problem Relation (Age of Onset)    Cataracts Father    Heart disease Father    Pacemaker/defibrilator Mother    Prostate cancer Father          Tobacco Use    Smoking status: Never     Passive exposure: Never    Smokeless tobacco: Never    Tobacco comments:     db   Substance and Sexual Activity    Alcohol use: Never     Comment: occasionally    Drug use: Never    Sexual activity: Not Currently     Partners: Female     Comment: Not active     Review of Systems   Constitutional:  Negative for fever.   Respiratory:  Negative for cough, choking, chest tightness, shortness of breath and wheezing.    Cardiovascular:  Negative for chest pain, palpitations and leg swelling.   Gastrointestinal:  Positive for abdominal pain and nausea. Negative for vomiting.   Genitourinary:  Negative for dysuria.   Musculoskeletal:  Positive for back pain. Negative for neck pain and neck stiffness.   Neurological:  Negative for tremors, speech difficulty, weakness, light-headedness and headaches.     Objective:     Vital Signs (Most Recent):  Temp: 98.7 °F (37.1 °C) (03/27/25 1700)  Pulse: 98 (03/27/25 1700)  Resp: 18 (03/27/25 1706)  BP: (!) 198/93 (03/27/25 1700)  SpO2: 98 % (03/27/25 1700) Vital Signs (24h Range):  Temp:  [98 °F (36.7 °C)-98.7 °F (37.1 °C)] 98.7 °F (37.1 °C)  Pulse:  [68-98] 98  Resp:  [18-20] 18  SpO2:  [95 %-100 %] 98 %  BP: (198-202)/(93-96) 198/93     Weight: 115.2 kg (254 lb)  Body mass index is 41 kg/m².     Physical Exam          "NAD, A/O 3   RRR   CTAB   Soft, mild tenderness of the right lower quadrant without rebound or guarding, bowel sounds present  No edema   Right paraspinal muscle tenderness present.  No spinal tenderness.  No dermatomal rash observed.    Symmetric strength in major muscle groups of the lower extremities.  No alteration in sensation of lower extremities to light touch.    Significant Labs: All pertinent labs within the past 24 hours have been reviewed.  CBC:   Recent Labs   Lab 03/27/25  1129   WBC 9.93   HGB 11.6*   HCT 35.9*        CMP:   Recent Labs   Lab 03/27/25  1129      K 4.4      CO2 25   BUN 8   CREATININE 0.6   CALCIUM 9.4   ALBUMIN 3.6   BILITOT 0.4   ALKPHOS 117   AST 18   ALT 32   ANIONGAP 10     Magnesium: No results for input(s): "MG" in the last 48 hours.    Significant Imaging: I have reviewed all pertinent imaging results/findings within the past 24 hours.  Assessment/Plan:     Assessment & Plan  Acute back pain    Paraspinal.  Located in the lower thoracic/upper lumbar region.  No warning signs   -scheduling acetaminophen, ketorolac.  As needed Dilaudid available.    -Pepcid ordered for GI prophylaxis   -MRI lumbar and thoracic spine ordered.  -Lovenox for DVT prophylaxis held in case procedure is required  PT/OT consulted     -of note,  workup was globally unremarkable except imaging did demonstrate a prominence of the right ureter without hydronephrosis.  Consider Urology consultation if pain does not improve  Hypertension, essential, benign  Patient's blood pressure range in the last 24 hours was: BP  Min: 198/93  Max: 202/96.The patient's inpatient anti-hypertensive regimen is listed below:  Current Antihypertensives  amLODIPine tablet 10 mg, Nightly, Oral  hydroCHLOROthiazide tablet 25 mg, Daily, Oral  hydrALAZINE injection 10 mg, Every 4 hours PRN, Intravenous    Plan  - BP is uncontrolled, will adjust as follows:  Order home medicines and p.r.n. hydralazine    Renal " cyst    Seen on CT.  This needs to be discussed with the patient.  Follow-up imaging is required.  VTE Risk Mitigation (From admission, onward)           Ordered     IP VTE HIGH RISK PATIENT  Once         03/27/25 1723     Place sequential compression device  Until discontinued         03/27/25 1723                       On 03/27/2025, patient should be placed in hospital observation services under my care.             Eliseo Benoit MD  Department of Hospital Medicine  [facility] - Emergency Dept

## 2025-03-27 NOTE — ASSESSMENT & PLAN NOTE
Patient's blood pressure range in the last 24 hours was: BP  Min: 198/93  Max: 202/96.The patient's inpatient anti-hypertensive regimen is listed below:  Current Antihypertensives  amLODIPine tablet 10 mg, Nightly, Oral  hydroCHLOROthiazide tablet 25 mg, Daily, Oral  hydrALAZINE injection 10 mg, Every 4 hours PRN, Intravenous    Plan  - BP is uncontrolled, will adjust as follows:  Order home medicines and p.r.n. hydralazine

## 2025-03-27 NOTE — HPI
65-year-old female with history hypertension presents with 2 days of right paraspinal muscular pain with pain extending into the right lower abdominal region potentially musculoskeletal in origin imaging globally suggest against a  origin (although abnormal prominent of the right ureter was noted without hydronephrosis).    Two days ago the patient work and she says she is frequently lifting things.  She does not remember an exact point were she developed the pain discussion.  By the end of the day though she noted that she was having right lower back pain which was mild-to-moderate severity.  The pain continued and by the morning of admission it was severe leading to her presentation.    It is worse in the region of the right paraspinal muscles.  She reports the pain radiates into her lower right abdomen.    No recent fever.  No change bowel habits was mildly nauseated in the setting of the pain.  No dysuria.    She continues tolerate oral intake.    No new lower extremity weakness or alteration in sensation.  No urinary incontinence or retention.

## 2025-03-27 NOTE — LETTER
"March 28, 2025             [facility]  [address] [address]  Phone: [phone]  March 28, 2025     Patient: Octavia Arrington (Dianne)     YOB: 1960        To Whom It May Concern:    Octavia Arrington was admitted to the hospital on 3/27/2025 11:14 AM and discharged on 3/28/25.  She may return to work on 4/1/25 .  If you have any questions or concerns, or if I can be of any further assistance, please do not hesitate to contact me.    Sincerely,        ***  Department of Hospital Medicine     "

## 2025-03-27 NOTE — ED NOTES
Pt reports right sided flank and side pain x3 days.  Pt has been taking naproxen and tylenol for pain relief without results. Pt denies dysuria, hematuria, sob, cp, fever. Pt aaox4. Pt appears uncomfortable due to pain. Tenderness to right side. Denies hx of kidney stone

## 2025-03-28 VITALS
OXYGEN SATURATION: 98 % | RESPIRATION RATE: 18 BRPM | SYSTOLIC BLOOD PRESSURE: 133 MMHG | HEIGHT: 66 IN | HEART RATE: 80 BPM | BODY MASS INDEX: 39.36 KG/M2 | WEIGHT: 244.94 LBS | DIASTOLIC BLOOD PRESSURE: 83 MMHG | TEMPERATURE: 98 F

## 2025-03-28 LAB
ABSOLUTE EOSINOPHIL (OHS): 0.19 K/UL
ABSOLUTE MONOCYTE (OHS): 0.78 K/UL (ref 0.3–1)
ABSOLUTE NEUTROPHIL COUNT (OHS): 5.49 K/UL (ref 1.8–7.7)
ALBUMIN SERPL BCP-MCNC: 3.3 G/DL (ref 3.5–5.2)
ALP SERPL-CCNC: 110 UNIT/L (ref 40–150)
ALT SERPL W/O P-5'-P-CCNC: 26 UNIT/L (ref 10–44)
ANION GAP (OHS): 10 MMOL/L (ref 8–16)
AST SERPL-CCNC: 13 UNIT/L (ref 11–45)
BASOPHILS # BLD AUTO: 0.02 K/UL
BASOPHILS NFR BLD AUTO: 0.2 %
BILIRUB SERPL-MCNC: 0.3 MG/DL (ref 0.1–1)
BUN SERPL-MCNC: 15 MG/DL (ref 8–23)
CALCIUM SERPL-MCNC: 9.1 MG/DL (ref 8.7–10.5)
CHLORIDE SERPL-SCNC: 104 MMOL/L (ref 95–110)
CO2 SERPL-SCNC: 24 MMOL/L (ref 23–29)
CREAT SERPL-MCNC: 0.7 MG/DL (ref 0.5–1.4)
ERYTHROCYTE [DISTWIDTH] IN BLOOD BY AUTOMATED COUNT: 14.3 % (ref 11.5–14.5)
GFR SERPLBLD CREATININE-BSD FMLA CKD-EPI: >60 ML/MIN/1.73/M2
GLUCOSE SERPL-MCNC: 90 MG/DL (ref 70–110)
HCT VFR BLD AUTO: 36.5 % (ref 37–48.5)
HGB BLD-MCNC: 11.8 GM/DL (ref 12–16)
IMM GRANULOCYTES # BLD AUTO: 0.04 K/UL (ref 0–0.04)
IMM GRANULOCYTES NFR BLD AUTO: 0.5 % (ref 0–0.5)
LYMPHOCYTES # BLD AUTO: 1.95 K/UL (ref 1–4.8)
MAGNESIUM SERPL-MCNC: 2.3 MG/DL (ref 1.6–2.6)
MCH RBC QN AUTO: 30.2 PG (ref 27–50)
MCHC RBC AUTO-ENTMCNC: 32.3 G/DL (ref 32–36)
MCV RBC AUTO: 93 FL (ref 82–98)
NUCLEATED RBC (/100WBC) (OHS): 0 /100 WBC
PLATELET # BLD AUTO: 297 K/UL (ref 150–450)
PMV BLD AUTO: 10.1 FL (ref 9.2–12.9)
POTASSIUM SERPL-SCNC: 3.6 MMOL/L (ref 3.5–5.1)
PROT SERPL-MCNC: 8.3 GM/DL (ref 6–8.4)
RBC # BLD AUTO: 3.91 M/UL (ref 4–5.4)
RELATIVE EOSINOPHIL (OHS): 2.2 %
RELATIVE LYMPHOCYTE (OHS): 23 % (ref 18–48)
RELATIVE MONOCYTE (OHS): 9.2 % (ref 4–15)
RELATIVE NEUTROPHIL (OHS): 64.9 % (ref 38–73)
SODIUM SERPL-SCNC: 138 MMOL/L (ref 136–145)
WBC # BLD AUTO: 8.47 K/UL (ref 3.9–12.7)

## 2025-03-28 PROCEDURE — 25000003 PHARM REV CODE 250: Performed by: INTERNAL MEDICINE

## 2025-03-28 PROCEDURE — 97161 PT EVAL LOW COMPLEX 20 MIN: CPT

## 2025-03-28 PROCEDURE — G0378 HOSPITAL OBSERVATION PER HR: HCPCS

## 2025-03-28 PROCEDURE — 36415 COLL VENOUS BLD VENIPUNCTURE: CPT | Performed by: INTERNAL MEDICINE

## 2025-03-28 PROCEDURE — 85025 COMPLETE CBC W/AUTO DIFF WBC: CPT | Performed by: INTERNAL MEDICINE

## 2025-03-28 PROCEDURE — 63600175 PHARM REV CODE 636 W HCPCS: Mod: JZ,TB | Performed by: INTERNAL MEDICINE

## 2025-03-28 PROCEDURE — 80053 COMPREHEN METABOLIC PANEL: CPT | Performed by: INTERNAL MEDICINE

## 2025-03-28 PROCEDURE — 97165 OT EVAL LOW COMPLEX 30 MIN: CPT

## 2025-03-28 PROCEDURE — 83735 ASSAY OF MAGNESIUM: CPT | Performed by: INTERNAL MEDICINE

## 2025-03-28 PROCEDURE — 96376 TX/PRO/DX INJ SAME DRUG ADON: CPT

## 2025-03-28 PROCEDURE — 99204 OFFICE O/P NEW MOD 45 MIN: CPT | Mod: ,,, | Performed by: STUDENT IN AN ORGANIZED HEALTH CARE EDUCATION/TRAINING PROGRAM

## 2025-03-28 RX ORDER — METHOCARBAMOL 500 MG/1
500 TABLET, FILM COATED ORAL 3 TIMES DAILY
Qty: 42 TABLET | Refills: 0 | Status: SHIPPED | OUTPATIENT
Start: 2025-03-28 | End: 2025-04-11

## 2025-03-28 RX ORDER — OXYCODONE AND ACETAMINOPHEN 5; 325 MG/1; MG/1
1 TABLET ORAL EVERY 6 HOURS PRN
Qty: 20 TABLET | Refills: 0 | Status: SHIPPED | OUTPATIENT
Start: 2025-03-28 | End: 2025-04-03

## 2025-03-28 RX ADMIN — ACETAMINOPHEN 1000 MG: 500 TABLET ORAL at 12:03

## 2025-03-28 RX ADMIN — FAMOTIDINE 20 MG: 20 TABLET, FILM COATED ORAL at 08:03

## 2025-03-28 RX ADMIN — KETOROLAC TROMETHAMINE 15 MG: 30 INJECTION, SOLUTION INTRAMUSCULAR; INTRAVENOUS at 12:03

## 2025-03-28 RX ADMIN — ACETAMINOPHEN 1000 MG: 500 TABLET ORAL at 08:03

## 2025-03-28 RX ADMIN — KETOROLAC TROMETHAMINE 15 MG: 30 INJECTION, SOLUTION INTRAMUSCULAR; INTRAVENOUS at 06:03

## 2025-03-28 RX ADMIN — HYDROCHLOROTHIAZIDE 25 MG: 25 TABLET ORAL at 08:03

## 2025-03-28 NOTE — CONSULTS
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    She states that a couple of days ago she was repeatedly lifting heavy bottles out of a box and that this required a twisting motion to get them unloaded.  She has a very active person.  This resulted in a significant amount of pain involving her right flank later that night.  This became so bad that she came to the emergency department for further evaluation.    Patient tells me this did seem to come from her back area and around her ribcage roughly at the level of the belly button across her right flank.  This was much worse with movement, but was persistent in quite sharp.  She never had anything like this before.  Now, it is a bit better.  She feels like it is probably a pulled muscle and is wanting to go home at this point.    She denies any balance issues or falls or previous history of much back pain.  Denies any numbness in the legs.  She further denies any history of pain shooting down the legs in a consistent way.     PTA Medications   Medication Sig    amLODIPine (NORVASC) 10 MG tablet Take 1 tablet (10 mg total) by mouth once daily.    hydroCHLOROthiazide (HYDRODIURIL) 25 MG tablet Take 1 tablet (25 mg total) by mouth once daily.    [DISCONTINUED] amoxicillin (AMOXIL) 500 MG Tab Take 1 tablet (500 mg total) by mouth every 12 (twelve) hours. for 10 days    [DISCONTINUED] benzonatate (TESSALON) 100 MG capsule Take 2 capsules (200 mg total) by mouth 3 (three) times daily as needed for Cough.    benzocaine-menthoL 15-3.6 mg Lozg 1 lozenge by Mucous Membrane route every 4 (four) hours as needed (sore throat).     [DISCONTINUED] promethazine-dextromethorphan (PROMETHAZINE-DM) 6.25-15 mg/5 mL Syrp Take 5 mLs by mouth every 6 (six) hours as needed (cough).       Review of patient's allergies indicates:   Allergen Reactions    Bactrim [sulfamethoxazole-trimethoprim] Hives, Swelling and Rash    Sulfa (sulfonamide antibiotics) Itching    Shellfish containing products Other (See Comments)     Mild itching    Iodine      Causes burning sensation on skin    Losartan Rash and Blisters     Causes lips to blister with a rash       Past Medical History:   Diagnosis Date    Acromioclavicular joint arthritis 8/15/2023    Cataract     Glaucoma     Hypertension     Uveitis      Past Surgical History:   Procedure Laterality Date    BREAST BIOPSY Bilateral     ex bx/ age 16 and 22    BREAST BIOPSY Right 10/2021    core bx    BREAST SURGERY  Biopsy last 10/29/20    BUNIONECTOMY Right      SECTION      HERNIA REPAIR  1975    At age 14    HYSTERECTOMY  2000    JOINT REPLACEMENT  R-. L-    Knee replacements    PARTIAL HYSTERECTOMY      2000    REPAIR, HERNIA, INGUINAL Right     1975    ROTATOR CUFF REPAIR      SMALL INTESTINE SURGERY      TOTAL REPLACEMENT OF BOTH KNEES USING COMPUTER-ASSISTED NAVIGATION      R in  and L in     TUBAL LIGATION  1991     Family History       Problem Relation (Age of Onset)    Cataracts Father    Heart disease Father    Pacemaker/defibrilator Mother    Prostate cancer Father          Tobacco Use    Smoking status: Never     Passive exposure: Never    Smokeless tobacco: Never    Tobacco comments:     db   Substance and Sexual Activity    Alcohol use: Never     Comment: occasionally    Drug use: Never    Sexual activity: Not Currently     Partners: Female     Comment: Not active     Review of Systems  Objective:     Weight: 111.1 kg (244 lb 14.9 oz)  Body mass index is 39.53 kg/m².  Vital Signs (Most Recent):  Temp: 97.7 °F (36.5 °C) (25 1142)  Pulse: 80 (25 1142)  Resp: 18  "(03/28/25 1142)  BP: 133/83 (03/28/25 1142)  SpO2: 98 % (03/28/25 1142) Vital Signs (24h Range):  Temp:  [97.5 °F (36.4 °C)-98.7 °F (37.1 °C)] 97.7 °F (36.5 °C)  Pulse:  [70-98] 80  Resp:  [18-19] 18  SpO2:  [92 %-98 %] 98 %  BP: (133-198)/(73-99) 133/83         Neurosurgery Physical Exam  Awake and alert  GCS 15  5/5 motor strength in the bilateral lower extremities including hip flexion, knee extension, dorsiflexion, plantar flexion  2+ patellar reflexes  No clonus  Sensation intact to light touch throughout lower extremities    Significant Labs:  Recent Labs   Lab 03/27/25  1129 03/28/25  0442    138   K 4.4 3.6    104   CO2 25 24   BUN 8 15   CREATININE 0.6 0.7   CALCIUM 9.4 9.1   MG  --  2.3     Recent Labs   Lab 03/27/25  1129 03/28/25  0442   WBC 9.93 8.47   HGB 11.6* 11.8*   HCT 35.9* 36.5*    297     No results for input(s): "LABPT", "INR", "APTT" in the last 48 hours.  Microbiology Results (last 7 days)       ** No results found for the last 168 hours. **            Significant Diagnostics:  03/27/2025 MRI thoracic spine  No acute fractures noted.  Right pleural effusion noted.  At the level of T9/10 there is subtle asymmetry of the dorsal aspect of the right side of the spinal cord.  There is CSF visible circumferentially around the spinal cord.  No spinal cord compression.  This could possibly represent an arachnoid web/cyst.  No significant mass effect on the spinal cord.  No cord signal change.    3/27/25 lumbar spine MRI   at L3/4 there is ligamentum flavum hypertrophy and a broad-based disc bulge contributing to moderate, near severe spinal canal stenosis.  CSF still visible between the nerve roots.  Otherwise, spondylosis throughout the lumbar spine.    Assessment/Plan:   This is a 65-year-old female who presents with right-sided back/flank pain, currently improved  -no intervention is indicated  -I believe patient's thoracic and lumbar imaging findings are not related to her " symptoms.  Not sure if her pleural effusion could be related, but luckily things are feeling better for her now  -I am okay with the patient going home  -we will arrange for the patient to see us in the clinic to further evaluate the progression of her symptoms.  It is hard for me to believe that her pain issues (now better) are related to either her thoracic or lumbar findings  -I discussed the imaging findings with the patient in detail and she is okay with seeing us in the clinic    Active Diagnoses:    Diagnosis Date Noted POA    PRINCIPAL PROBLEM:  Acute back pain [M54.9] 08/15/2023 Yes    Renal cyst [N28.1] 03/27/2025 Not Applicable    Hypertension, essential, benign [I10] 03/27/2024 Yes      Problems Resolved During this Admission:       Thank you for your consult. I will sign off. Please contact us if you have any additional questions.    Cy High MD  Neurosurgery  [facility]

## 2025-03-28 NOTE — PLAN OF CARE
03/28/25 1109   Final Note   Assessment Type Final Discharge Note   Anticipated Discharge Disposition Home   Hospital Resources/Appts/Education Provided Appointments scheduled and added to AVS;Community resources provided   Post-Acute Status   Post-Acute Authorization Other   Other Status No Post-Acute Service Needs     Pts nurse Madhu notified that the pt can d/c from CM standpoint

## 2025-03-28 NOTE — HOSPITAL COURSE
65-year-old female with history hypertension presents with 2 days of right paraspinal muscular pain with pain extending into the right lower abdominal region potentially musculoskeletal in origin imaging globally suggest against a  origin (although abnormal prominent of the right ureter was noted without hydronephrosis).   Two days ago the patient work and she says she is frequently lifting things.  She does not remember an exact point were she developed the pain discussion.  By the end of the day though she noted that she was having right lower back pain which was mild-to-moderate severity.  The pain continued and by the morning of admission it was severe leading to her presentation.  Patient was neurologic intact,MRI lumbar show,pondylitic central canal and foraminal stenosis, with areas of severe foraminal stenosis at L3-4 and L4-5 and moderate central canal stenosis at L3-4.   Patient did well with PT,OT,pain was controlled with IV Toradol.consulted neurosurgery,recommenced out patient follow up.  Patient was discharged home with out patient PT,OT,pain  and muscle relaxant and follow up with PCP and neurosurgery as out patient.

## 2025-03-28 NOTE — PLAN OF CARE
Problem: Physical Therapy  Goal: Physical Therapy Goal  Outcome: Met     Pt ambulated ~250 ft with mod I using no AD.  Pt may benefit from outpatient PT services for back pain.

## 2025-03-28 NOTE — NURSING
[facility], [facility]  Nurses Note -- 4 Eyes      3/28/2025       Skin assessed on: Q Shift      [x] No Pressure Injuries Present    []Prevention Measures Documented    [] Yes LDA  for Pressure Injury Previously documented     [] Yes New Pressure Injury Discovered   [] LDA for New Pressure Injury Added      Attending RN:  Madhu Guzmán, RN     Second RN:  Jeanie

## 2025-03-28 NOTE — PLAN OF CARE
Problem: Occupational Therapy  Goal: Occupational Therapy Goal  Description: Goals to be met by: 3/28/2025     Patient will increase functional independence with ADLs by performing:    LE Dressing with Modified [place].  Toileting from toilet with Modified [place] for hygiene and clothing management.   Step transfer with Modified [place]  Toilet transfer to toilet with Modified [place].    Outcome: Adequate for Care Transition     OT initial eval completed. Pt ADLs/functional mobility with Mod I and no AD, although with R-sided back pain and increased time/effort for activity. Recommend OP PT once medically stable.

## 2025-03-28 NOTE — PT/OT/SLP EVAL
Occupational Therapy   Evaluation    Name: Octavia Arrington  MRN: [id number]  Admitting Diagnosis: Acute back pain  Recent Surgery: * No surgery found *      Recommendations:     Discharge Recommendations: Low Intensity Therapy (OP PT)  Discharge Equipment Recommendations:  none  Barriers to discharge:  None    Assessment:     Octavia Arrington is a 65 y.o. female with a medical diagnosis of Acute back pain.  She presents with The primary encounter diagnosis was Spinal stenosis of lumbar region, unspecified whether neurogenic claudication present. Diagnoses of HTN (hypertension), Abdominal pain, Pleural effusion, Renal cyst, right, Acute right flank pain, and Chest pain were also pertinent to this visit. . Performance deficits affecting function: pain.    OT initial eval completed. Pt ADLs/functional mobility with Mod I and no AD, although with R-sided back pain and increased time/effort for activity. Recommend OP PT once medically stable.   Rehab Prognosis: Good; patient would benefit from acute skilled OT services to address these deficits and reach maximum level of function.       Plan:     Patient to be seen  (d/c acute OT) to address the above listed problems via    Plan of Care Expires: 03/28/25  Plan of Care Reviewed with: patient    Subjective     Chief Complaint: pain  Patient/Family Comments/goals: pt reports pain is more controlled; ready to return home    Occupational Profile:  Living Environment: Lives with son, grandtr, 2SH, 7STE; bedroom on 1st flr, t/s c  Previous level of function: Independent, works, drives; takes baths sitting down inside tub. Owns but wasn't using a RW  Equipment Used at Home: walker, rolling, shower chair  Assistance upon Discharge: family    Pain/Comfort:  Pain Rating 1: 3/10  Pain Addressed 1: Pre-medicate for activity, Reposition, Distraction, Cessation of Activity, Nurse notified  Pain Rating Post-Intervention 1: 3/10    Objective:     Communicated with: RN prior to session.  Patient  found HOB elevated with peripheral IV upon OT entry to room.    General Precautions: Standard, fall  Orthopedic Precautions: N/A  Braces: N/A  Respiratory Status: Room air    Occupational Performance:    Bed Mobility:    Patient completed Scooting/Bridging with modified independence  Patient completed Supine to Sit with modified independence    Functional Mobility/Transfers:  Patient completed Sit <> Stand Transfer with modified independence  with  no assistive device   Patient completed Bed <> Chair Transfer using Step Transfer technique with modified independence with no assistive device  Patient completed Toilet Transfer Step Transfer technique with modified independence with  no AD  Functional Mobility: Pt performed in room mobility as above with Mod I/s and no AD. Vc/tcs on rolling/body mechanics during ADLs/mobility to prevent twisting and pain relieving techniques.     Activities of Daily Living:  Grooming: modified independence in stance at sink with no AD  Lower Body Dressing: modified independence to pull up socks seated  Toileting: modified independence in bathroom    Cognitive/Visual Perceptual:  Cognitive/Psychosocial Skills:     -       Oriented to: Person, Place, Time, and Situation   -       Follows Commands/attention:Follows multistep  commands  -       Communication: clear/fluent  -       Memory: No Deficits noted  -       Safety awareness/insight to disability: intact   -       Mood/Affect/Coping skills/emotional control: Cooperative and Pleasant  Visual/Perceptual:      -Intact  acuity      Physical Exam:  Balance:    -       Good sit/stand  Dominant hand:    -       R  Upper Extremity Range of Motion:     -       Right Upper Extremity: grossly WFL although hx of shldr injury  -       Left Upper Extremity: WFL  Upper Extremity Strength:    -       Right Upper Extremity: WFL  -       Left Upper Extremity: WFL   Strength:    -       Right Upper Extremity: WFL  -       Left Upper Extremity:  WFL  Gross motor coordination:   WFL    AMPAC 6 Click ADL:  AMPAC Total Score: 24    Treatment & Education:  Patient educated on role of OT/ POC development. Co-eval overlap with PT to maximize function and safety. Occupational profile developed via interview. Patient guided to transition eob for assessment. Initiated ADL / functional mobility training as above with instruction on performance of ADLs/mobility with prevention of twisting/heavy lifting and edu on proper body mechanics/joint protection for pain relief. POC/pt progress discussed with MD who entered at end. Educated patient on importance of out of bed mobility, movement/repositioning throughout the day, and call button use. Answered questions within scope, and all needs met.     Patient left ambulatory in room/garcia with all lines intact, RN notified, and PT present    GOALS:   Multidisciplinary Problems       Occupational Therapy Goals          Problem: Occupational Therapy    Goal Priority Disciplines Outcome Interventions   Occupational Therapy Goal     OT, PT/OT Adequate for Care Transition    Description: Goals to be met by: 3/28/2025     Patient will increase functional independence with ADLs by performing:    LE Dressing with Modified [place].  Toileting from toilet with Modified [place] for hygiene and clothing management.   Step transfer with Modified [place]  Toilet transfer to toilet with Modified [place].                         DME Justifications:  No DME recommended requiring DME justifications    History:     Past Medical History:   Diagnosis Date    Acromioclavicular joint arthritis 8/15/2023    Cataract     Glaucoma     Hypertension     Uveitis          Past Surgical History:   Procedure Laterality Date    BREAST BIOPSY Bilateral     ex bx/ age 16 and 22    BREAST BIOPSY Right 10/2021    core bx    BREAST SURGERY  Biopsy last 10/29/20    BUNIONECTOMY Right      SECTION      HERNIA REPAIR      At age 14     HYSTERECTOMY  8/2000    JOINT REPLACEMENT  R-2011. L-2013    Knee replacements    PARTIAL HYSTERECTOMY      2000    REPAIR, HERNIA, INGUINAL Right     1975    ROTATOR CUFF REPAIR      SMALL INTESTINE SURGERY      TOTAL REPLACEMENT OF BOTH KNEES USING COMPUTER-ASSISTED NAVIGATION      R in 2011 and L in 2013    TUBAL LIGATION  2/1991       Time Tracking:     OT Date of Treatment: 03/28/25  OT Start Time: 1040  OT Stop Time: 1055  OT Total Time (min): 15 min    Billable Minutes:Evaluation 15    3/28/2025

## 2025-03-28 NOTE — ASSESSMENT & PLAN NOTE
Patient's blood pressure range in the last 24 hours was: BP  Min: 134/80  Max: 198/93.The patient's inpatient anti-hypertensive regimen is listed below:  Current Antihypertensives  amLODIPine tablet 10 mg, Nightly, Oral  hydroCHLOROthiazide tablet 25 mg, Daily, Oral  hydrALAZINE injection 10 mg, Every 4 hours PRN, Intravenous    Plan  - BP is uncontrolled, will adjust as follows:  Order home medicines and p.r.n. hydralazine

## 2025-03-28 NOTE — DISCHARGE INSTRUCTIONS
Our goal at [facility] is to always give you outstanding care and exceptional service. You may receive a survey from [employer] by mail, text or e-mail in the next 24-48 hours asking about the care you received with us. The survey should only take 5-10 minutes to complete and is very important to us.     Your feedback provides us with a way to recognize our staff who work tirelessly to provide the best care! Also, your responses help us learn how to improve when your experience was below our aspiration of excellence. We are always looking for ways to improve your stay. We WILL use your feedback to continue making improvements to help us provide the highest quality care. We keep your personal information and feedback confidential. We appreciate your time completing this survey and can't wait to hear from you!!!    We look forward to your continued care with us! Thanks so much for choosing [facility] for your healthcare needs!

## 2025-03-28 NOTE — DISCHARGE SUMMARY
[facility] Medicine  Discharge Summary      Patient Name: Octavia Arrington  MRN: [id number]  EUGENIO: [id number]  Patient Class: OP- Observation  Admission Date: 3/27/2025  Hospital Length of Stay: 0 days  Discharge Date and Time:  03/28/2025 11:09 AM  Attending Physician: Emperatriz Perez, *   Discharging Provider: Emperatriz Perez MD  Primary Care Provider: Do Nguyen MD    Primary Care Team: Networked reference to record PCT     HPI:   65-year-old female with history hypertension presents with 2 days of right paraspinal muscular pain with pain extending into the right lower abdominal region potentially musculoskeletal in origin imaging globally suggest against a  origin (although abnormal prominent of the right ureter was noted without hydronephrosis).    Two days ago the patient work and she says she is frequently lifting things.  She does not remember an exact point were she developed the pain discussion.  By the end of the day though she noted that she was having right lower back pain which was mild-to-moderate severity.  The pain continued and by the morning of admission it was severe leading to her presentation.    It is worse in the region of the right paraspinal muscles.  She reports the pain radiates into her lower right abdomen.    No recent fever.  No change bowel habits was mildly nauseated in the setting of the pain.  No dysuria.    She continues tolerate oral intake.    No new lower extremity weakness or alteration in sensation.  No urinary incontinence or retention.    * No surgery found *      Hospital Course:   65-year-old female with history hypertension presents with 2 days of right paraspinal muscular pain with pain extending into the right lower abdominal region potentially musculoskeletal in origin imaging globally suggest against a  origin (although abnormal prominent of the right ureter was noted without hydronephrosis).   Two days ago the patient work and  she says she is frequently lifting things.  She does not remember an exact point were she developed the pain discussion.  By the end of the day though she noted that she was having right lower back pain which was mild-to-moderate severity.  The pain continued and by the morning of admission it was severe leading to her presentation.  Patient was neurologic intact,MRI lumbar show,pondylitic central canal and foraminal stenosis, with areas of severe foraminal stenosis at L3-4 and L4-5 and moderate central canal stenosis at L3-4.   Patient did well with PT,OT,pain was controlled with IV Toradol.consulted neurosurgery,recommenced out patient follow up.  Patient was discharged home with out patient PT,OT,pain  and muscle relaxant and follow up with PCP and neurosurgery as out patient.          Goals of Care Treatment Preferences:  Code Status: Full Code         Consults:   Consults (From admission, onward)          Status Ordering Provider     Inpatient consult to Neurosurgery  Once        Provider:  (Not yet assigned)    Acknowledged ROBERT RIVERA            Assessment & Plan  Acute back pain    Paraspinal.  Located in the lower thoracic/upper lumbar region.  No warning signs   -scheduling acetaminophen, ketorolac.  As needed Dilaudid available.    -Pepcid ordered for GI prophylaxis   -MRI lumbar and thoracic spine ordered.  -Lovenox for DVT prophylaxis held in case procedure is required  PT/OT consulted     -of note,  workup was globally unremarkable except imaging did demonstrate a prominence of the right ureter without hydronephrosis.  Consider Urology consultation if pain does not improve  Hypertension, essential, benign  Patient's blood pressure range in the last 24 hours was: BP  Min: 134/80  Max: 198/93.The patient's inpatient anti-hypertensive regimen is listed below:  Current Antihypertensives  amLODIPine tablet 10 mg, Nightly, Oral  hydroCHLOROthiazide tablet 25 mg, Daily, Oral  hydrALAZINE  injection 10 mg, Every 4 hours PRN, Intravenous    Plan  - BP is uncontrolled, will adjust as follows:  Order home medicines and p.r.n. hydralazine    Renal cyst    Seen on CT.  This needs to be discussed with the patient.  Follow-up imaging is required.  Final Active Diagnoses:    Diagnosis Date Noted POA    PRINCIPAL PROBLEM:  Acute back pain [M54.9] 08/15/2023 Yes    Renal cyst [N28.1] 03/27/2025 Not Applicable    Hypertension, essential, benign [I10] 03/27/2024 Yes      Problems Resolved During this Admission:       Discharged Condition: stable    Disposition: Home or Self Care    Follow Up:   Follow-up Information       Clinic, [facility] Follow up.    Why: call at time of discharge to schedule follow up and establish care for future medical needs  Contact information:  [address] [place] [id number] [phone]                           Patient Instructions:      Ambulatory referral/consult to Neurosurgery   Standing Status: Future   Referral Priority: Routine Referral Type: Consultation   Referral Reason: Specialty Services Required   Requested Specialty: Neurosurgery   Number of Visits Requested: 1     Ambulatory referral/consult to Occupational Medicine   Standing Status: Future   Referral Priority: Routine Referral Type: Occupational Medicine   Referral Reason: Specialty Services Required   Requested Specialty: Occupational Medicine   Number of Visits Requested: 1     PT evaluate and treat   Standing Status: Future Standing Exp. Date: 03/28/26   Order Comments: Treat according to established therapy treatment plan.     Activity as tolerated       Significant Diagnostic Studies: Labs: BMP:   Recent Labs   Lab 03/27/25  1129 03/28/25  0442    138   K 4.4 3.6    104   CO2 25 24   BUN 8 15   CREATININE 0.6 0.7   CALCIUM 9.4 9.1   MG  --  2.3   , CMP   Recent Labs   Lab 03/27/25  1129 03/28/25  0442    138   K 4.4 3.6    104   CO2 25 24   BUN 8 15   CREATININE 0.6 0.7    CALCIUM 9.4 9.1   ALBUMIN 3.6 3.3*   BILITOT 0.4 0.3   ALKPHOS 117 110   AST 18 13   ALT 32 26   ANIONGAP 10 10   , and CBC   Recent Labs   Lab 03/27/25  1129 03/28/25  0442   WBC 9.93 8.47   HGB 11.6* 11.8*   HCT 35.9* 36.5*    297     Radiology: MRI: lumbar,thoracic     Pending Diagnostic Studies:       None           Medications:  Reconciled Home Medications:      Medication List        START taking these medications      methocarbamoL 500 MG Tab  Commonly known as: Robaxin  Take 1 tablet (500 mg total) by mouth 3 (three) times daily. for 14 days     oxyCODONE-acetaminophen 5-325 mg per tablet  Commonly known as: PERCOCET  Take 1 tablet by mouth every 6 (six) hours as needed.            CONTINUE taking these medications      amLODIPine 10 MG tablet  Commonly known as: NORVASC  Take 1 tablet (10 mg total) by mouth once daily.     benzocaine-menthoL 15-3.6 mg Lozg  1 lozenge by Mucous Membrane route every 4 (four) hours as needed (sore throat).     hydroCHLOROthiazide 25 MG tablet  Commonly known as: HYDRODIURIL  Take 1 tablet (25 mg total) by mouth once daily.            STOP taking these medications      amoxicillin 500 MG Tab  Commonly known as: AMOXIL     benzonatate 100 MG capsule  Commonly known as: TESSALON     promethazine-dextromethorphan 6.25-15 mg/5 mL Syrp  Commonly known as: PROMETHAZINE-DM              Indwelling Lines/Drains at time of discharge:   Lines/Drains/Airways       None                   Time spent on the discharge of patient: less than 30  minutes         Emperatriz Perez MD  Department of Hospital Medicine  [facility]

## 2025-03-28 NOTE — ED NOTES
To MRI per w/c w/ transport. NAD noted. Report handoff completed.  Pt will be going to room 304 after MRI.

## 2025-03-28 NOTE — PT/OT/SLP EVAL
Physical Therapy Evaluation and Discharge Note    Patient Name:  Octavia Arrington   MRN:  [id number]    Recommendations:     Discharge Recommendations: Low Intensity Therapy (outpatient PT services)  Discharge Equipment Recommendations: none   Barriers to discharge: None    Assessment:     Octavia Arrington is a 65 y.o. female admitted with a medical diagnosis of Acute back pain.  At this time, patient is functioning at their prior level of function and does not require further acute PT services.     Recent Surgery: * No surgery found *      Plan:     During this hospitalization, patient does not require further acute PT services.  Please re-consult if situation changes.      Subjective     Chief Complaint: R lower back pain  Patient/Family Comments/goals: Pt agreeable to ambulation in the hallway.   Pain/Comfort:  Pain Rating 1: 2/10  Location - Side 1: Right  Location - Orientation 1: lower  Location 1: back  Pain Addressed 1: Pre-medicate for activity, Reposition, Distraction, Cessation of Activity      Living Environment:  Pt lives with son and grand-daughter in a 2SH with ~7 LUNA at entry.  Pt's bedroom is on the 1st floor.   Prior to admission, patients level of function was independent, driving, and working as a cook/.  Equipment at home: shower chair, walker, rolling.  Upon discharge, patient will have assistance from family.    Objective:     Patient found up in chair with OT present with peripheral IV upon PT entry to room.    General Precautions: Standard, fall    Orthopedic Precautions:N/A   Braces: N/A  Respiratory Status: Room air    Exams:  Cognitive Exam:  Patient was able to follow multiple commands.   Gross Motor Coordination:  WFL  Postural Exam:  Patient presented with the following abnormalities:    -       Anterior pelvic tilt  Sensation:    -       Intact  light/touch BLE  Skin Integrity/Edema:      -       Skin integrity: Visible skin intact  -       Edema: None noted BLE  BLE ROM: WFL  BLE  Strength: WFL 4/5    Functional Mobility:  Transfers:     Sit to Stand: modified independence with no AD  Bed to Chair: modified independence with  no AD  using  Step Transfer  Gait: Pt ambulated ~250 ft with mod I using no AD.  Pt with decreased step length and brent.    Balance: Pt with fair/fair+ dynamic standing balance.     AM-PAC 6 CLICK MOBILITY  Total Score:23       Treatment and Education:  Pt educated on outpatient PT services.  Pt familiar with outpt PT services due to h/o B TKA.  Pt educated on posture,  when lifting objects, and sleep position with placement of pillow.  Pt verbalized good understanding.          Patient left up in chair with all lines intact, call button in reach, and Dr. Pham present.  Tray table close by.     GOALS:   Multidisciplinary Problems       Physical Therapy Goals       Not on file              Multidisciplinary Problems (Resolved)          Problem: Physical Therapy    Goal Priority Disciplines Outcome Interventions   Physical Therapy Goal   (Resolved)     PT, PT/OT Met                          History:     Past Medical History:   Diagnosis Date    Acromioclavicular joint arthritis 8/15/2023    Cataract     Glaucoma     Hypertension     Uveitis        Past Surgical History:   Procedure Laterality Date    BREAST BIOPSY Bilateral     ex bx/ age 16 and 22    BREAST BIOPSY Right 10/2021    core bx    BREAST SURGERY  Biopsy last 10/29/20    BUNIONECTOMY Right      SECTION      HERNIA REPAIR      At age 14    HYSTERECTOMY  2000    JOINT REPLACEMENT  R-. L-    Knee replacements    PARTIAL HYSTERECTOMY          REPAIR, HERNIA, INGUINAL Right         ROTATOR CUFF REPAIR      SMALL INTESTINE SURGERY      TOTAL REPLACEMENT OF BOTH KNEES USING COMPUTER-ASSISTED NAVIGATION      R in  and L in     TUBAL LIGATION  1991       Time Tracking:     PT Received On: 25  PT Start Time: 1046     PT Stop Time: 1059  PT Total Time (min):  13 min     Billable Minutes: Evaluation 13 min      03/28/2025

## 2025-03-31 ENCOUNTER — PATIENT OUTREACH (OUTPATIENT)
Dept: ADMINISTRATIVE | Facility: CLINIC | Age: 65
End: 2025-03-31
Payer: COMMERCIAL

## 2025-03-31 ENCOUNTER — PATIENT MESSAGE (OUTPATIENT)
Dept: ADMINISTRATIVE | Facility: CLINIC | Age: 65
End: 2025-03-31
Payer: COMMERCIAL

## 2025-03-31 LAB
OHS QRS DURATION: 82 MS
OHS QTC CALCULATION: 419 MS

## 2025-03-31 NOTE — PROGRESS NOTES
C3 nurse attempted to contact Octavia Arrington for a TCC post hospital discharge follow up call. No answer. No voicemail available.The patient does not have a scheduled HOSFU appointment. Unable to send message to PCP staff.

## 2025-04-01 NOTE — PROGRESS NOTES
3rd attempt made to reach patient for TCC call. Left voicemail please call [phone] leave first name, last, and date of birth for Ileana. I will return your call.

## 2025-04-01 NOTE — PROGRESS NOTES
2nd attempt made to reach patient for TCC call. Left voicemail please call [phone] leave first name, last, and date of birth for Ileana. I will return your call.

## 2025-04-28 ENCOUNTER — TELEPHONE (OUTPATIENT)
Dept: URGENT CARE | Facility: CLINIC | Age: 65
End: 2025-04-28
Payer: COMMERCIAL

## 2025-04-30 ENCOUNTER — TELEPHONE (OUTPATIENT)
Dept: URGENT CARE | Facility: CLINIC | Age: 65
End: 2025-04-30
Payer: COMMERCIAL

## 2025-05-01 ENCOUNTER — TELEPHONE (OUTPATIENT)
Dept: URGENT CARE | Facility: CLINIC | Age: 65
End: 2025-05-01
Payer: COMMERCIAL

## 2025-05-28 ENCOUNTER — OFFICE VISIT (OUTPATIENT)
Dept: NEUROSURGERY | Facility: CLINIC | Age: 65
End: 2025-05-28
Attending: STUDENT IN AN ORGANIZED HEALTH CARE EDUCATION/TRAINING PROGRAM
Payer: COMMERCIAL

## 2025-05-28 VITALS
HEIGHT: 66 IN | HEART RATE: 83 BPM | WEIGHT: 254 LBS | DIASTOLIC BLOOD PRESSURE: 73 MMHG | SYSTOLIC BLOOD PRESSURE: 140 MMHG | OXYGEN SATURATION: 97 % | BODY MASS INDEX: 40.82 KG/M2

## 2025-05-28 DIAGNOSIS — M54.2 NECK PAIN: Primary | ICD-10-CM

## 2025-05-28 DIAGNOSIS — M54.9 BACK PAIN, UNSPECIFIED BACK LOCATION, UNSPECIFIED BACK PAIN LATERALITY, UNSPECIFIED CHRONICITY: ICD-10-CM

## 2025-05-28 PROCEDURE — 1159F MED LIST DOCD IN RCRD: CPT | Mod: CPTII,S$GLB,, | Performed by: PHYSICIAN ASSISTANT

## 2025-05-28 PROCEDURE — 1101F PT FALLS ASSESS-DOCD LE1/YR: CPT | Mod: CPTII,S$GLB,, | Performed by: PHYSICIAN ASSISTANT

## 2025-05-28 PROCEDURE — 3078F DIAST BP <80 MM HG: CPT | Mod: CPTII,S$GLB,, | Performed by: PHYSICIAN ASSISTANT

## 2025-05-28 PROCEDURE — 99213 OFFICE O/P EST LOW 20 MIN: CPT | Mod: S$GLB,,, | Performed by: PHYSICIAN ASSISTANT

## 2025-05-28 PROCEDURE — 1160F RVW MEDS BY RX/DR IN RCRD: CPT | Mod: CPTII,S$GLB,, | Performed by: PHYSICIAN ASSISTANT

## 2025-05-28 PROCEDURE — 3008F BODY MASS INDEX DOCD: CPT | Mod: CPTII,S$GLB,, | Performed by: PHYSICIAN ASSISTANT

## 2025-05-28 PROCEDURE — 3077F SYST BP >= 140 MM HG: CPT | Mod: CPTII,S$GLB,, | Performed by: PHYSICIAN ASSISTANT

## 2025-05-28 PROCEDURE — 3288F FALL RISK ASSESSMENT DOCD: CPT | Mod: CPTII,S$GLB,, | Performed by: PHYSICIAN ASSISTANT

## 2025-05-28 NOTE — PROGRESS NOTES
[facility]  Neurosurgery    SUBJECTIVE:     Interval history 05/28/2025:   Patient presents to neurosurgery clinic new to me today for hospital follow up of thoracic back pain.  She reports that the pain she had in the hospital is largely resolved.  She has returned to work and still required to lift heavy items once per week.  But she has been more mindful of how she lifts and will often get help if the items are too heavy.  New today is RUE pain that began about 2 weeks ago.  The pain radiates from her right shoulder blade down her right arm, generally stopping at the elbow but sometimes going into the forearm.  It was severe when it 1st happened, but has since subsided to a much more manageable level.  She denies any associated numbness or weakness.  She denies any specific incident that may have preceded the pain, but believes that in general lifting at her work is contributing.    She was referred to PT at discharge but did not attend due to cost.      History of Present Illness from Dr. High inpatient consult 03/28/2025:    This is a very pleasant 65-year-old female who works as a  at international [employer].  She states that a couple of days ago she was repeatedly lifting heavy bottles out of a box and that this required a twisting motion to get them unloaded.  She has a very active person.  This resulted in a significant amount of pain involving her right flank later that night.  This became so bad that she came to the emergency department for further evaluation.     Patient tells me this did seem to come from her back area and around her ribcage roughly at the level of the belly button across her right flank.  This was much worse with movement, but was persistent in quite sharp.  She never had anything like this before.  Now, it is a bit better.  She feels like it is probably a pulled muscle and is wanting to go home at this point.     She denies any balance issues or  "falls or previous history of much back pain.  Denies any numbness in the legs.  She further denies any history of pain shooting down the legs in a consistent way    (Not in a hospital admission)      Review of patient's allergies indicates:   Allergen Reactions    Bactrim [sulfamethoxazole-trimethoprim] Hives, Swelling and Rash    Sulfa (sulfonamide antibiotics) Itching    Shellfish containing products Other (See Comments)     Mild itching    Iodine      Causes burning sensation on skin    Losartan Rash and Blisters     Causes lips to blister with a rash       Past Medical History:   Diagnosis Date    Acromioclavicular joint arthritis 8/15/2023    Cataract     Glaucoma     Hypertension     Uveitis      Past Surgical History:   Procedure Laterality Date    BREAST BIOPSY Bilateral     ex bx/ age 16 and 22    BREAST BIOPSY Right 10/2021    core bx    BREAST SURGERY  Biopsy last 10/29/20    BUNIONECTOMY Right      SECTION      HERNIA REPAIR      At age 14    HYSTERECTOMY  2000    JOINT REPLACEMENT  R-. L-    Knee replacements    PARTIAL HYSTERECTOMY          REPAIR, HERNIA, INGUINAL Right         ROTATOR CUFF REPAIR      SMALL INTESTINE SURGERY      TOTAL REPLACEMENT OF BOTH KNEES USING COMPUTER-ASSISTED NAVIGATION      R in  and L in     TUBAL LIGATION  1991     Social History[1]     Review of Systems:  As noted in HPI    OBJECTIVE:     Vital Signs (Most Recent):  Vitals:    25 0655   BP: (!) 140/73   Pulse: 83   SpO2: 97%   Weight: 115.2 kg (253 lb 15.5 oz)   Height: 5' 6" (1.676 m)     Body mass index is 40.99 kg/m².      Physical Exam:  General: well developed, well nourished, no distress  Head: normocephalic, atraumatic  Neurologic: Alert and oriented. Thought content appropriate  GCS: Motor: 6/Verbal: 5/Eyes: 4 GCS Total: 15  Language: No aphasia  Speech: No dysarthria  Cranial nerves: face symmetric, tongue midline, CN II-XII grossly intact.   Eyes: pupils equal, " round, reactive to light with accommodation, EOMI.   Pulmonary: normal respirations, not labored, no accessory muscles used    Sensory: intact to light touch throughout b/l upper and lower extremities    Motor Strength: Moves all extremities spontaneously with good tone.  Full strength upper and lower extremities. No abnormal movements seen.     Strength  Deltoids Triceps Biceps Wrist Extension Wrist Flexion Hand  FA   Upper: R 5/5 5/5 5/5 5/5 5/5 5/5 5/5    L 5/5 5/5 5/5 5/5 5/5 5/5 5/5     Iliopsoas Quadriceps  Tibialis  anterior Gastro- cnemius     Lower: R 5/5 5/5  5/5 5/5      L 5/5 5/5  5/5 5/5       DTR's - 2 + and symmetric brachioradialis & patellar  Pena: absent  Clonus: absent    Gait: normal      Diagnostic Results:  I have personally reviewed imaging and agree with the findings.     3/27/2025 MRI thoracic spine  No acute fractures noted.  Right pleural effusion noted.  At the level of T9/10 there is subtle asymmetry of the dorsal aspect of the right side of the spinal cord.  There is CSF visible circumferentially around the spinal cord.  No spinal cord compression.  This could possibly represent an arachnoid web/cyst.  No significant mass effect on the spinal cord.  No cord signal change.     3/27/25 lumbar spine MRI   at L3/4 there is ligamentum flavum hypertrophy and a broad-based disc bulge contributing to moderate, near severe spinal canal stenosis.  CSF still visible between the nerve roots.  Otherwise, spondylosis throughout the lumbar spine.      ASSESSMENT/PLAN:     Octavia Arrington is a 65 y.o. female who presents new to me for hospital follow up regarding thoracic back pain.  Thoracic pain is essentially resolved, but she has new complaints of RUE pain.  She is neurologically intact on exam and reports the pain is already improving since initial onset.  I recommend a trial of physical therapy for her neck and back.  We discussed her concerns about cost, and I recommend spacing out her  visits while performing PT directed home exercises in between sessions.    Follow up in 4 months, sooner if weakness or numbness develops    Please feel free to call with any further questions        Ruby Hopkins PA-C  [facility]  Department of Neurosurgery  [phone]    Disclaimer: This note was dictated by speech recognition. Minor errors in transcription may be present.  Please call with any questions.         [1]   Social History  Tobacco Use    Smoking status: Never     Passive exposure: Never    Smokeless tobacco: Never    Tobacco comments:     db   Substance Use Topics    Alcohol use: Never     Comment: occasionally    Drug use: Never

## 2025-07-10 ENCOUNTER — OFFICE VISIT (OUTPATIENT)
Dept: FAMILY MEDICINE | Facility: CLINIC | Age: 65
End: 2025-07-10
Payer: COMMERCIAL

## 2025-07-10 ENCOUNTER — TELEPHONE (OUTPATIENT)
Dept: ADMINISTRATIVE | Facility: HOSPITAL | Age: 65
End: 2025-07-10
Payer: COMMERCIAL

## 2025-07-10 ENCOUNTER — TELEPHONE (OUTPATIENT)
Dept: FAMILY MEDICINE | Facility: CLINIC | Age: 65
End: 2025-07-10

## 2025-07-10 VITALS — SYSTOLIC BLOOD PRESSURE: 130 MMHG | DIASTOLIC BLOOD PRESSURE: 82 MMHG

## 2025-07-10 DIAGNOSIS — I10 HYPERTENSION, ESSENTIAL, BENIGN: Primary | ICD-10-CM

## 2025-07-10 DIAGNOSIS — Z12.11 COLON CANCER SCREENING: Primary | ICD-10-CM

## 2025-07-10 PROCEDURE — 99999 PR PBB SHADOW E&M-EST. PATIENT-LVL II: CPT | Mod: PBBFAC,,,

## 2025-07-10 PROCEDURE — 99499 UNLISTED E&M SERVICE: CPT | Mod: S$GLB,,, | Performed by: INTERNAL MEDICINE

## 2025-07-10 RX ORDER — HYDROCHLOROTHIAZIDE 25 MG/1
25 TABLET ORAL DAILY
Qty: 90 TABLET | Refills: 0 | Status: SHIPPED | OUTPATIENT
Start: 2025-07-10

## 2025-07-10 RX ORDER — AMLODIPINE BESYLATE 10 MG/1
10 TABLET ORAL DAILY
Qty: 90 TABLET | Refills: 0 | Status: SHIPPED | OUTPATIENT
Start: 2025-07-10

## 2025-07-10 NOTE — PROGRESS NOTES
Good BP.  Both meds have been sent to the pharmacy. The patient will be due for another follow up visit with me in October 2025.

## 2025-07-10 NOTE — TELEPHONE ENCOUNTER
FitKit was given to patient on 7/10/2025 10:39 AM   THE PATIENT CAME INTO THE CLINIC FOR A BLOOD PRESSURE CHECK AND REQUEST A FIT KIT.

## 2025-07-10 NOTE — TELEPHONE ENCOUNTER
Copied from CRM #[id number]. Topic: General Inquiry - Return Call  >> Jul 10, 2025  2:53 PM Estrellita wrote:  .Type:  Patient Returning Call    Who Called: Self     Who Left Message for Patient: Leonardo     Does the patient know what this is regarding?: yes     Would the patient rather a call back or a response via [facility]? Call     Best Call Back Number: .[phone]      Additional Information: [facility] [address]

## 2025-07-10 NOTE — TELEPHONE ENCOUNTER
----- Message from Do Nguyen MD sent at 7/10/2025  1:28 PM CDT -----  Good BP.  Both meds have been sent to the pharmacy. The patient will be due for another follow up visit with me in October 2025.

## 2025-07-10 NOTE — PROGRESS NOTES
Octavia Arrington 65 y.o. female is here today for Blood Pressure check.   History of HTN yes.    Review of patient's allergies indicates:   Allergen Reactions    Bactrim [sulfamethoxazole-trimethoprim] Hives, Swelling and Rash    Sulfa (sulfonamide antibiotics) Itching    Shellfish containing products Other (See Comments)     Mild itching    Iodine      Causes burning sensation on skin    Losartan Rash and Blisters     Causes lips to blister with a rash     Creatinine   Date Value Ref Range Status   03/28/2025 0.7 0.5 - 1.4 mg/dL Final     Sodium   Date Value Ref Range Status   03/28/2025 138 136 - 145 mmol/L Final   03/22/2024 142 135 - 146 mmol/L Final     Potassium   Date Value Ref Range Status   03/28/2025 3.6 3.5 - 5.1 mmol/L Final   03/22/2024 4.0 3.5 - 5.3 mmol/L Final   ]  Patient verifies taking blood pressure medications on a regular basis at the same time of the day.   Current Medications[1]  Does patient have record of home blood pressure readings no. Readings have been averaging  - no readings.   Last dose of blood pressure medication was taken at 7/10/2025@2:00 am.  Patient is asymptomatic.   Complains of  headache, this morning, which is why she came in for bp check today.      130/82  right arm, sitting, manual     Dr. Nguyen informed of nurse visit.  Patient advised to continue current medication regimen. Patient verbalized understanding, requesting refill of bp medications.           [1]   Current Outpatient Medications:     amLODIPine (NORVASC) 10 MG tablet, Take 1 tablet (10 mg total) by mouth once daily., Disp: 90 tablet, Rfl: 1    benzocaine-menthoL 15-3.6 mg Lozg, 1 lozenge by Mucous Membrane route every 4 (four) hours as needed (sore throat)., Disp: 18 lozenge, Rfl: 0    hydroCHLOROthiazide (HYDRODIURIL) 25 MG tablet, Take 1 tablet (25 mg total) by mouth once daily., Disp: 90 tablet, Rfl: 1

## 2025-07-17 ENCOUNTER — CLINICAL SUPPORT (OUTPATIENT)
Dept: REHABILITATION | Facility: HOSPITAL | Age: 65
End: 2025-07-17
Attending: PHYSICIAN ASSISTANT
Payer: COMMERCIAL

## 2025-07-17 DIAGNOSIS — R53.1 DECREASED STRENGTH, ENDURANCE, AND MOBILITY: ICD-10-CM

## 2025-07-17 DIAGNOSIS — R68.89 DECREASED STRENGTH, ENDURANCE, AND MOBILITY: ICD-10-CM

## 2025-07-17 DIAGNOSIS — M54.50 CHRONIC RIGHT-SIDED LOW BACK PAIN WITHOUT SCIATICA: Primary | ICD-10-CM

## 2025-07-17 DIAGNOSIS — M54.9 BACK PAIN, UNSPECIFIED BACK LOCATION, UNSPECIFIED BACK PAIN LATERALITY, UNSPECIFIED CHRONICITY: ICD-10-CM

## 2025-07-17 DIAGNOSIS — G89.29 CHRONIC RIGHT-SIDED LOW BACK PAIN WITHOUT SCIATICA: Primary | ICD-10-CM

## 2025-07-17 DIAGNOSIS — Z74.09 DECREASED STRENGTH, ENDURANCE, AND MOBILITY: ICD-10-CM

## 2025-07-17 DIAGNOSIS — M54.2 NECK PAIN: ICD-10-CM

## 2025-07-17 PROCEDURE — 97112 NEUROMUSCULAR REEDUCATION: CPT | Mod: PN

## 2025-07-17 PROCEDURE — 97161 PT EVAL LOW COMPLEX 20 MIN: CPT | Mod: PN

## 2025-07-17 NOTE — PROGRESS NOTES
[facility]  Physical Therapy Initial Evaluation  Date: 7/17/2025   Name: Octavia Arrington  Clinic Number: [id number]    Therapy Diagnosis:   Encounter Diagnoses   Name Primary?    Neck pain     Back pain, unspecified back location, unspecified back pain laterality, unspecified chronicity     Chronic right-sided low back pain without sciatica Yes    Decreased strength, endurance, and mobility      Physician: Ruby Hopkins, *    Physician Orders: PT Eval and Treat   Medical Diagnosis from Referral:   M54.2 (ICD-10-CM) - Neck pain   M54.9 (ICD-10-CM) - Back pain, unspecified back location, unspecified back pain laterality, unspecified chronicity     Evaluation Date: 7/17/2025  Authorization Period Expiration: 12-31-25  Plan of Care Expiration: 10-17-25  Visit # / Visits authorized: 1/ 20   Progress Note Due: 8-17-25  FOTO: 1/ 1    Precautions: Standard    Time In: 7:00 am  Time Out: 7:45 am  Total Appointment Time (timed & untimed codes): 45  minutes    Subjective   Date of onset: 3-27-25  History of current condition - Octavia reports: that she has high co-pay. Pt states she pull muscles in lower lower back. She went to ER. She got medication. She states today she is feeling a lot better. She denies pain radiating down towards B LE. She states pain was localized in R lower back and wrap around her R stomach. She denies any numbness and tingling sensation. She states she has not having much pain for awhile.       M.D note:      History of Present Illness from Dr. 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  She states that a couple of days ago she was repeatedly lifting heavy bottles out of a box and that this required a twisting motion to get them unloaded.  She has a very active person.  This resulted in a significant amount of pain involving her right flank later that night.   This became so bad that she came to the emergency department for further evaluation.     Patient tells me this did seem to come from her back area and around her ribcage roughly at the level of the belly button across her right flank.  This was much worse with movement, but was persistent in quite sharp.  She never had anything like this before.  Now, it is a bit better.  She feels like it is probably a pulled muscle and is wanting to go home at this point.     She denies any balance issues or falls or previous history of much back pain.  Denies any numbness in the legs.  She further denies any history of pain shooting down the legs in a consistent way    GERARDO: She  something heavy.   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       Medical History:   Past Medical History:   Diagnosis Date    Acromioclavicular joint arthritis 8/15/2023    Cataract     Glaucoma     Hypertension     Uveitis        Surgical History:   Octavia Arrington  has a past surgical history that includes Breast biopsy (Bilateral); repair, hernia, inguinal (Right); Partial hysterectomy; Small intestine surgery; Total replacement of both knees using computer-assisted navigation; Rotator cuff repair;  Bunionectomy (Right); Breast biopsy (Right, 10/2021);  section (); Breast surgery (Biopsy last 10/29/20); Hernia repair (); Hysterectomy (2000); Tubal ligation (1991); and Joint replacement (R-. L-).    Medications:   Octavia has a current medication list which includes the following prescription(s): amlodipine, benzocaine-menthol, and hydrochlorothiazide.    Allergies:   Review of patient's allergies indicates:   Allergen Reactions    Bactrim [sulfamethoxazole-trimethoprim] Hives, Swelling and Rash    Sulfa (sulfonamide antibiotics) Itching    Shellfish containing products Other (See Comments)     Mild itching    Iodine      Causes burning sensation on skin    Losartan Rash and Blisters     Causes lips to blister with a rash          Objective       Observation:     Posture Alignment: slouched posture;decreased lordosis;    Sensation: intact to light touch    DTR:: intact to light touch    GAIT DEVIATIONS: Octavia displays no major deviation     Lumbar Range of Motion:    %   Flexion WFL   Extension WFL     Left Side Bending WFL   Right Side Bending WFL   Left rotation   WFL   Right Rotation   WFL    *= pain    Passive hip ROM in degrees:     Left Right   IR WFL WFL   ER WFL WFL     Lower Extremity Strength    Right LE  Left LE    Hip flexion: 4+/5 Hip flexion: 4+/5   Knee extension: 4+/5 Knee extension: 4+/5   Knee flexion: 4+/5 Knee flexion: 4+/5   Hip IR: 4+/5 Hip IR: 4+/5   Hip ER: 4+/5 Hip ER: 4+/5   Hip extension:  4+/5 Hip extension: 4+/5   Hip abduction: 4+/5 Hip abduction: 4+/5   Hip adduction: 4+/5 Hip adduction 4+/5   Ankle dorsiflexion: 4+/5 Ankle dorsiflexion: 4+/5   Ankle plantarflexion: 4+/5 Ankle plantarflexion: 4+/5     Special Tests:  -REE: negative  -Scour: negative  -Repeated Flexion: negative  -Repeated Ext:negative  -Bridge Test: negative  -Long sitting test:negative  -Trendelenburg test :negative        Neuro Dynamic Testing:    Sciatic nerve:      SLR:  negative     Neural Tension:     Slump test: negative      Palpation: No lower back pain     Flexibility:    Ely's test: R = WFL ; L = WFL   Popliteal Angle: R = WFL ; L = WFL       PT Evaluation Completed? Yes  Discussed Plan of Care with patient: Yes                   TREATMENT   Treatment Time In: 7:30 am  Treatment Time Out: 7:40 am  Total Treatment time separate from Evaluation: 10 minutes      Octavia participated in neuromuscular re-education activities to improve: Balance, Proprioception, Posture, and muscles motor control  for 10 minutes. The following activities were included:  Bridge  Posterior pelvic tilt   Supine marching + TrA activation       Home Exercises and Patient Education Provided    Education provided:   - Perform HEP 2 times per day    Written Home Exercises Provided: Patient instructed to cont prior HEP.  Exercises were reviewed and Octavia was able to demonstrate them prior to the end of the session.  Octavia demonstrated good  understanding of the education provided.     See EMR under Patient Instructions for exercises provided 7/17/2025.    Assessment   Octavia is a 65 y.o. female referred to outpatient Physical Therapy with a medical diagnosis of  Back pain, unspecified back location, unspecified back pain laterality, unspecified chronicity. Pt presents with signs and symptoms consistent with referring diagnosis. Pt has high co-pay. Pt is doing well. Pt has no pain for the last 3 weeks. Pt has been functional at home and community.  Subjective and objective measures are addressed by goals in the plan of care.  Patient/family are involved in the development of these goals. Patient/family are educated about current injury and treatment. Pt demonstrates no additional cultural, spiritual or educational need and currently has no barriers to learning. Pt responded well to treatment today. Pt is a good candidate for skilled physical therapy intervention and has a good prognosis and is motivated to  return to functional and  recreational activities. Pt will call us if she needs further assistance. At this time, we will place therapy on hold.     Pt prognosis is Good.   Pt will benefit from skilled outpatient Physical Therapy to address the deficits stated above and in the chart below, provide pt/family education, and to maximize pt's level of independence.     Plan of care discussed with patient: Yes  Pt's spiritual, cultural and educational needs considered and patient is agreeable to the plan of care and goals as stated below:     Anticipated Barriers for therapy: Scheduling issues and high co-pay     Medical Necessity is demonstrated by the following  History  Co-morbidities and personal factors that may impact the plan of care [x] LOW: no personal factors / co-morbidities  [] MODERATE: 1-2 personal factors / co-morbidities  [] HIGH: 3+ personal factors / co-morbidities    Moderate / High Support Documentation:   Co-morbidities affecting plan of care:   Acromioclavicular joint arthritis   Cataract   Glaucoma   Hypertension   Uveitis       Personal Factors:   no deficits     Examination  Body Structures and Functions, activity limitations and participation restrictions that may impact the plan of care [x] LOW: addressing 1-2 elements  [] MODERATE: 3+ elements  [] HIGH: 4+ elements (please support below)    Moderate / High Support Documentation: AROM and strength and flexibility      Clinical Presentation [x] LOW: stable  [] MODERATE: Evolving  [] HIGH: Unstable     Decision Making/ Complexity Score: low       GOALS: Short Term Goals:  4 weeks  1. Report decreased in pain at worse less than  <   / =  5  /10  to increase tolerance for functional activities.On going  2. Increased B LE  MMT 1/2  to increase tolerance for ADL and work activities.On going  3. Pt to tolerate HEP to improve ROM and independence with ADL's.On going  4. Pt to improve lumbar range of motion by 25% to allow for improved functional  mobility to allow for improvement in IADLs. On going    Long Term Goals: 8 weeks  1. Report decreased in pain at worse less than  <   / =  2  /10  to increase tolerance for functional mobility.  On going  2. Increased B LE  MMT 1 grade to increase tolerance for ADL and work activities.On going  3. Pt will report at 20% or less limitation on FOTO Lumbar spine survey  to demonstrate decrease in disability and improvement in back pain.On going  4. Pt to be Independent with HEP to improve ROM and independence with ADL's. On going  5. Pt to demonstrate negative Bridge Test in order to show improved core strength for lumbar stabilization. On going  6. Pt to improve range of motion by 75% to allow for improved functional mobility to allow for improvement in IADLs. On going      Plan   Plan of care Certification: 7/17/2025 to 10-17-25.    Outpatient Physical Therapy 2 times weekly for 12 weeks to include the following interventions: Cervical/Lumbar Traction, Gait Training, Manual Therapy, Moist Heat/ Ice, Neuromuscular Re-ed, Patient Education, Self Care, Therapeutic Activities, and Therapeutic Exercise. Dry frank Grayson, PT      I CERTIFY THE NEED FOR THESE SERVICES FURNISHED UNDER THIS PLAN OF TREATMENT AND WHILE UNDER MY CARE   Physician's comments:     Physician's Signature: ___________________________________________________

## 2025-07-29 ENCOUNTER — LAB VISIT (OUTPATIENT)
Dept: LAB | Facility: HOSPITAL | Age: 65
End: 2025-07-29
Payer: COMMERCIAL

## 2025-07-29 DIAGNOSIS — Z12.11 COLON CANCER SCREENING: ICD-10-CM

## 2025-07-29 LAB — OB PNL STL IA: NEGATIVE

## 2025-07-29 PROCEDURE — 82274 ASSAY TEST FOR BLOOD FECAL: CPT
